# Patient Record
Sex: FEMALE | Race: BLACK OR AFRICAN AMERICAN | Employment: FULL TIME | URBAN - METROPOLITAN AREA
[De-identification: names, ages, dates, MRNs, and addresses within clinical notes are randomized per-mention and may not be internally consistent; named-entity substitution may affect disease eponyms.]

---

## 2020-08-17 ENCOUNTER — APPOINTMENT (OUTPATIENT)
Dept: GENERAL RADIOLOGY | Age: 40
End: 2020-08-17
Payer: MEDICAID

## 2020-08-17 ENCOUNTER — HOSPITAL ENCOUNTER (EMERGENCY)
Age: 40
Discharge: HOME | End: 2020-08-17
Payer: MEDICAID

## 2020-08-17 VITALS
SYSTOLIC BLOOD PRESSURE: 123 MMHG | WEIGHT: 126.1 LBS | BODY MASS INDEX: 21.01 KG/M2 | DIASTOLIC BLOOD PRESSURE: 83 MMHG | HEIGHT: 65 IN | HEART RATE: 96 BPM | OXYGEN SATURATION: 97 % | RESPIRATION RATE: 16 BRPM | TEMPERATURE: 98.4 F

## 2020-08-17 PROCEDURE — 6370000000 HC RX 637 (ALT 250 FOR IP): Performed by: PHYSICIAN ASSISTANT

## 2020-08-17 PROCEDURE — 99283 EMERGENCY DEPT VISIT LOW MDM: CPT

## 2020-08-17 PROCEDURE — 73140 X-RAY EXAM OF FINGER(S): CPT

## 2020-08-17 RX ORDER — ACETAMINOPHEN 325 MG/1
TABLET ORAL
Status: DISPENSED
Start: 2020-08-17 | End: 2020-08-18

## 2020-08-17 RX ORDER — ACETAMINOPHEN 325 MG/1
650 TABLET ORAL ONCE
Status: COMPLETED | OUTPATIENT
Start: 2020-08-17 | End: 2020-08-17

## 2020-08-17 RX ORDER — NAPROXEN 500 MG/1
500 TABLET ORAL 2 TIMES DAILY WITH MEALS
Qty: 30 TABLET | Refills: 0 | Status: ON HOLD | OUTPATIENT
Start: 2020-08-17 | End: 2020-12-26

## 2020-08-17 RX ORDER — NAPROXEN 250 MG/1
TABLET ORAL
Status: DISPENSED
Start: 2020-08-17 | End: 2020-08-18

## 2020-08-17 RX ORDER — ACETAMINOPHEN 500 MG
500 TABLET ORAL EVERY 6 HOURS PRN
Qty: 30 TABLET | Refills: 0 | Status: SHIPPED | OUTPATIENT
Start: 2020-08-17 | End: 2021-05-13 | Stop reason: CLARIF

## 2020-08-17 RX ORDER — NAPROXEN 250 MG/1
500 TABLET ORAL ONCE
Status: COMPLETED | OUTPATIENT
Start: 2020-08-17 | End: 2020-08-17

## 2020-08-17 RX ADMIN — ACETAMINOPHEN 650 MG: 325 TABLET ORAL at 17:55

## 2020-08-17 RX ADMIN — NAPROXEN 500 MG: 250 TABLET ORAL at 17:55

## 2020-08-17 ASSESSMENT — PAIN SCALES - GENERAL
PAINLEVEL_OUTOF10: 10
PAINLEVEL_OUTOF10: 10

## 2020-08-17 ASSESSMENT — ENCOUNTER SYMPTOMS
VOMITING: 0
NAUSEA: 0
DIARRHEA: 0
COUGH: 0
SHORTNESS OF BREATH: 0
BACK PAIN: 0
ABDOMINAL PAIN: 0
EYE PAIN: 0

## 2020-08-17 ASSESSMENT — PAIN DESCRIPTION - LOCATION: LOCATION: FINGER (COMMENT WHICH ONE)

## 2020-08-17 ASSESSMENT — PAIN DESCRIPTION - DESCRIPTORS: DESCRIPTORS: ACHING;THROBBING

## 2020-08-17 ASSESSMENT — PAIN DESCRIPTION - PAIN TYPE: TYPE: ACUTE PAIN

## 2020-08-17 ASSESSMENT — PAIN DESCRIPTION - FREQUENCY: FREQUENCY: INTERMITTENT

## 2020-08-17 ASSESSMENT — PAIN DESCRIPTION - ORIENTATION: ORIENTATION: RIGHT

## 2020-08-17 NOTE — ED PROVIDER NOTES
[address] [facility] ENCOUNTER        Pt Name: Lakesha Ramos  MRN: [id number]  [place] 1980  Date of evaluation: 8/17/2020  Provider: YARELI Cardoza  PCP: Jose Angel Dupree MD    Evaluation by EDBBIE. My supervising physician was available for consultation. CHIEF COMPLAINT       Chief Complaint   Patient presents with    Finger Pain     right index at first joint intermittent x 2+months. No injury       HISTORY OF PRESENT ILLNESS   (Location, Timing/Onset, Context/Setting, Quality, Duration, Modifying Factors, Severity, Associated Signs and Symptoms)  Note limiting factors. Lakesha Ramos is a 47 y.o. female duration of finger pain. Patient reports a 2-month history of atraumatic right second MCP pain. Reports new swelling and worsening pain since yesterday. It is aching and throbbing, rated as severe. It is constant. No fever/chills. No numbness or tingling. Pain is worse with touch and flexion/extension of digit. No wound. No injury. No h/o gout. The patient denies fever or chills, chest pain, shortness of breath, abdominal pain, nausea, vomiting, diarrhea. No recent travel, exposure to sick contacts, or recent antibiotics. No other acute concerns, associated symptoms or modifying factors. Nursing Notes were all reviewed and agreed with or any disagreements were addressed in the HPI. REVIEW OF SYSTEMS    (2-9 systems for level 4, 10 or more for level 5)     Review of Systems   Constitutional: Negative for chills, fatigue and fever. Eyes: Negative for pain. Respiratory: Negative for cough and shortness of breath. Cardiovascular: Negative for chest pain. Gastrointestinal: Negative for abdominal pain, diarrhea, nausea and vomiting. Genitourinary: Negative for dysuria. Musculoskeletal: Positive for arthralgias and joint swelling. Negative for back pain, neck pain and neck stiffness. Skin: Negative for rash.    Neurological: Negative for dizziness and headaches. Psychiatric/Behavioral: Negative for confusion. Positives and Pertinent negatives as per HPI. Except as noted above in the ROS, all other systems were reviewed and negative. PAST MEDICAL HISTORY     Past Medical History:   Diagnosis Date    Anemia     Scabies     Tobacco dependence          SURGICAL HISTORY     Past Surgical History:   Procedure Laterality Date    TYMPANOSTOMY TUBE PLACEMENT           CURRENTMEDICATIONS       Previous Medications    FAMOTIDINE (PEPCID) 20 MG TABLET    Take 1 tablet by mouth 2 times daily    MAGNESIUM OXIDE (MAGOX 400) 400 (241.3 MG) MG TABS TABLET    Take 1 tablet by mouth 2 times daily    ONDANSETRON (ZOFRAN ODT) 4 MG DISINTEGRATING TABLET    Take 1 tablet by mouth every 8 hours as needed for Nausea    POTASSIUM & SODIUM PHOSPHATES (PHOS-NAK) 280-160-250 MG PACK    Take 1 packet by mouth 4 times daily         ALLERGIES     Patient has no known allergies. FAMILYHISTORY       Family History   Problem Relation Age of Onset    High Blood Pressure Mother     Diabetes Maternal Grandmother     Stroke Maternal Grandmother           SOCIAL HISTORY       Social History     Tobacco Use    Smoking status: Current Every Day Smoker     Packs/day: 0.50    Smokeless tobacco: Never Used   Substance Use Topics    Alcohol use: Yes     Comment: 1-2 weekly    Drug use: Yes     Types: Marijuana     Comment: every other day        SCREENINGS             PHYSICAL EXAM    (up to 7 for level 4, 8 or more for level 5)     ED Triage Vitals   BP Temp Temp src Pulse Resp SpO2 Height Weight   -- -- -- -- -- -- -- --       Physical Exam  Vitals signs and nursing note reviewed. Constitutional:       General: She is not in acute distress. Appearance: She is well-developed. She is not diaphoretic. HENT:      Head: Normocephalic and atraumatic. Eyes:      General:         Right eye: No discharge. Left eye: No discharge.    Neck: Musculoskeletal: Normal range of motion and neck supple. Pulmonary:      Effort: No respiratory distress. Breath sounds: No stridor. Musculoskeletal:      Left wrist: Normal.      Left hand: She exhibits decreased range of motion (limited index flexion due to pain. Able to extend digit fully.) and tenderness (Mild tenderness and swelling of the RIGHTsecond MCP region. No swelling extending into the digit or into the palmar aspect of the hand. There is no fluctuance. There is no warmth. It is tender with palpation. Brisk capillary refill. Sensation intact ). Skin:     General: Skin is warm and dry. Coloration: Skin is not pale. Neurological:      Mental Status: She is alert and oriented to person, place, and time. Comments: No gross facial drooping. Moves all 4 extremities spontaneously. Psychiatric:         Behavior: Behavior normal.         DIAGNOSTIC RESULTS   LABS:    Labs Reviewed - No data to display    All other labs were within normal range or not returned as of this dictation. EKG: All EKG's are interpreted by the Emergency Department Physician in the absence of a cardiologist.  Please see their note for interpretation of EKG. RADIOLOGY:   Non-plain film images such as CT, Ultrasound and MRI are read by the radiologist. Plain radiographic images are visualized and preliminarily interpreted by the ED Provider with the below findings:        Interpretation per the Radiologist below, if available at the time of this note:    XR FINGER RIGHT (MIN 2 VIEWS)   Preliminary Result   No acute osseous abnormality noted. Small periarticular sublucency which could represent degenerative change or   represent sequela of an inflammatory arthritity in the correct clinical   setting. Please correlate with patient history. No results found.         PROCEDURES   Unless otherwise noted below, none     Procedures    CRITICAL CARE TIME   N/A    CONSULTS:  None      EMERGENCY DEPARTMENT COURSE and DIFFERENTIAL DIAGNOSIS/MDM:   Vitals:    Vitals:    08/17/20 1714   BP: 123/83   Pulse: 96   Resp: 16   Temp: 98.4 °F (36.9 °C)   TempSrc: Oral   SpO2: 97%   Weight: 126 lb 1.7 oz (57.2 kg)   Height: 5' 5\" (1.651 m)       Patient was given the following medications:  Medications   naproxen (NAPROSYN) tablet 500 mg (500 mg Oral Given 8/17/20 1755)   acetaminophen (TYLENOL) tablet 650 mg (650 mg Oral Given 8/17/20 1755)           Differential diagnosis includes but not limited to fracture, dislocation, vascular injury, neurologic injury. Patient seen and examined today for atraumatic RIGHT MCP pain x 2 months with new swelling and worsening pain since yesterday. See HPI for patient presentation. Patient is in no acute distress, nontoxic, afebrile with unremarkable vital signs. No evidence of neurovascular injury. Plain films reviewed and interpreted: negative for acute osseous abnormality. Small periarticular sub-lucency likely representing inflammatory arthritis given history of present illness. Do not suspect septic arthritis. Recommended cool compresses, elevation, NSAIDs, follow-up with PCP for further evaluation. I instructed the patient to return to the ED immediately for any new or worsening symptoms. The patient verbalizes understanding. I estimate there is LOW risk for FRACTURE, COMPARTMENT SYNDROME, DEEP VENOUS THROMBOSIS, SEPTIC ARTHRITIS, TENDON OR NEUROVASCULAR INJURY, thus I consider the discharge disposition reasonable. FINAL IMPRESSION      1. Metacarpophalangeal joint pain, right    2.  Inflammatory arthritis          DISPOSITION/PLAN   DISPOSITION        PATIENT REFERREDTO:  Drew Gaxiola MD  [id number]  [address]  [phone]      ED follow up    [address]  [place] [address] [phone]    If symptoms worsen      DISCHARGE MEDICATIONS:  New Prescriptions

## 2020-08-17 NOTE — LETTER
[address] [facility] [address]  Phone: [phone]               August 17, 2020    Patient: Vannesa Craig   YOB: 1980   Date of Visit: 8/17/2020       To Whom It May Concern:    Vannesa Craig was seen and treated in our emergency department on 8/17/2020. She may return to work on 8/18/20 with no use of right hand until 8/22.       Sincerely,       YARELI Lentz         Signature:__________________________________

## 2020-08-20 ENCOUNTER — HOSPITAL ENCOUNTER (EMERGENCY)
Age: 40
Discharge: HOME | End: 2020-08-20
Attending: EMERGENCY MEDICINE
Payer: MEDICAID

## 2020-08-20 ENCOUNTER — APPOINTMENT (OUTPATIENT)
Dept: CT IMAGING | Age: 40
End: 2020-08-20
Payer: MEDICAID

## 2020-08-20 VITALS
TEMPERATURE: 96.6 F | HEART RATE: 90 BPM | DIASTOLIC BLOOD PRESSURE: 70 MMHG | OXYGEN SATURATION: 98 % | BODY MASS INDEX: 20.8 KG/M2 | RESPIRATION RATE: 16 BRPM | SYSTOLIC BLOOD PRESSURE: 113 MMHG | WEIGHT: 125 LBS

## 2020-08-20 LAB
A/G RATIO: 1.3 (ref 1.1–2.2)
ALBUMIN SERPL-MCNC: 4.4 G/DL (ref 3.4–5)
ALP BLD-CCNC: 96 U/L (ref 40–129)
ALT SERPL-CCNC: 7 U/L (ref 10–40)
ANION GAP SERPL CALCULATED.3IONS-SCNC: 21 MMOL/L (ref 3–16)
AST SERPL-CCNC: 23 U/L (ref 15–37)
BASOPHILS ABSOLUTE: 0 K/UL (ref 0–0.2)
BASOPHILS RELATIVE PERCENT: 0.4 %
BILIRUB SERPL-MCNC: 0.4 MG/DL (ref 0–1)
BUN BLDV-MCNC: 6 MG/DL (ref 7–20)
CALCIUM SERPL-MCNC: 9.4 MG/DL (ref 8.3–10.6)
CHLORIDE BLD-SCNC: 99 MMOL/L (ref 99–110)
CO2: 21 MMOL/L (ref 21–32)
CREAT SERPL-MCNC: <0.5 MG/DL (ref 0.6–1.1)
EOSINOPHILS ABSOLUTE: 0 K/UL (ref 0–0.6)
EOSINOPHILS RELATIVE PERCENT: 0.1 %
GFR AFRICAN AMERICAN: >60
GFR NON-AFRICAN AMERICAN: >60
GLOBULIN: 3.5 G/DL
GLUCOSE BLD-MCNC: 135 MG/DL (ref 70–99)
HCG QUALITATIVE: NEGATIVE
HCT VFR BLD CALC: 37.8 % (ref 36–48)
HEMOGLOBIN: 12.7 G/DL (ref 12–16)
LIPASE: 19 U/L (ref 13–60)
LYMPHOCYTES ABSOLUTE: 1.9 K/UL (ref 1–5.1)
LYMPHOCYTES RELATIVE PERCENT: 29.1 %
MAGNESIUM: 1.6 MG/DL (ref 1.8–2.4)
MCH RBC QN AUTO: 32 PG (ref 26–34)
MCHC RBC AUTO-ENTMCNC: 33.5 G/DL (ref 31–36)
MCV RBC AUTO: 95.4 FL (ref 80–100)
MONOCYTES ABSOLUTE: 0.4 K/UL (ref 0–1.3)
MONOCYTES RELATIVE PERCENT: 5.8 %
NEUTROPHILS ABSOLUTE: 4.2 K/UL (ref 1.7–7.7)
NEUTROPHILS RELATIVE PERCENT: 64.6 %
OCCULT BLOOD SCREENING: ABNORMAL
PDW BLD-RTO: 13.2 % (ref 12.4–15.4)
PLATELET # BLD: 225 K/UL (ref 135–450)
PMV BLD AUTO: 8 FL (ref 5–10.5)
POTASSIUM REFLEX MAGNESIUM: 2.9 MMOL/L (ref 3.5–5.1)
RBC # BLD: 3.97 M/UL (ref 4–5.2)
SODIUM BLD-SCNC: 141 MMOL/L (ref 136–145)
TOTAL PROTEIN: 7.9 G/DL (ref 6.4–8.2)
WBC # BLD: 6.5 K/UL (ref 4–11)

## 2020-08-20 PROCEDURE — 83735 ASSAY OF MAGNESIUM: CPT

## 2020-08-20 PROCEDURE — C9113 INJ PANTOPRAZOLE SODIUM, VIA: HCPCS | Performed by: EMERGENCY MEDICINE

## 2020-08-20 PROCEDURE — 93005 ELECTROCARDIOGRAM TRACING: CPT

## 2020-08-20 PROCEDURE — 2580000003 HC RX 258: Performed by: EMERGENCY MEDICINE

## 2020-08-20 PROCEDURE — 84703 CHORIONIC GONADOTROPIN ASSAY: CPT

## 2020-08-20 PROCEDURE — 96365 THER/PROPH/DIAG IV INF INIT: CPT

## 2020-08-20 PROCEDURE — 85025 COMPLETE CBC W/AUTO DIFF WBC: CPT

## 2020-08-20 PROCEDURE — 36415 COLL VENOUS BLD VENIPUNCTURE: CPT

## 2020-08-20 PROCEDURE — 96366 THER/PROPH/DIAG IV INF ADDON: CPT

## 2020-08-20 PROCEDURE — G0328 FECAL BLOOD SCRN IMMUNOASSAY: HCPCS

## 2020-08-20 PROCEDURE — 96372 THER/PROPH/DIAG INJ SC/IM: CPT

## 2020-08-20 PROCEDURE — 6360000002 HC RX W HCPCS: Performed by: EMERGENCY MEDICINE

## 2020-08-20 PROCEDURE — 83690 ASSAY OF LIPASE: CPT

## 2020-08-20 PROCEDURE — 6360000004 HC RX CONTRAST MEDICATION: Performed by: EMERGENCY MEDICINE

## 2020-08-20 PROCEDURE — 80053 COMPREHEN METABOLIC PANEL: CPT

## 2020-08-20 PROCEDURE — 74177 CT ABD & PELVIS W/CONTRAST: CPT

## 2020-08-20 PROCEDURE — 96375 TX/PRO/DX INJ NEW DRUG ADDON: CPT

## 2020-08-20 PROCEDURE — 96367 TX/PROPH/DG ADDL SEQ IV INF: CPT

## 2020-08-20 PROCEDURE — 6370000000 HC RX 637 (ALT 250 FOR IP): Performed by: EMERGENCY MEDICINE

## 2020-08-20 PROCEDURE — 99283 EMERGENCY DEPT VISIT LOW MDM: CPT

## 2020-08-20 RX ORDER — PREDNISONE 50 MG/1
50 TABLET ORAL DAILY
Qty: 4 TABLET | Refills: 0 | Status: SHIPPED | OUTPATIENT
Start: 2020-08-20 | End: 2020-08-24

## 2020-08-20 RX ORDER — PROMETHAZINE HYDROCHLORIDE 25 MG/1
25 TABLET ORAL EVERY 6 HOURS PRN
Qty: 20 TABLET | Refills: 0 | Status: SHIPPED | OUTPATIENT
Start: 2020-08-20 | End: 2020-08-25

## 2020-08-20 RX ORDER — DICYCLOMINE HYDROCHLORIDE 10 MG/1
10 CAPSULE ORAL EVERY 6 HOURS PRN
Qty: 20 CAPSULE | Refills: 0 | Status: SHIPPED | OUTPATIENT
Start: 2020-08-20 | End: 2021-01-15 | Stop reason: SDUPTHER

## 2020-08-20 RX ORDER — PREDNISONE 20 MG/1
20 TABLET ORAL ONCE
Status: COMPLETED | OUTPATIENT
Start: 2020-08-20 | End: 2020-08-20

## 2020-08-20 RX ORDER — CIPROFLOXACIN 500 MG/1
500 TABLET, FILM COATED ORAL ONCE
Status: COMPLETED | OUTPATIENT
Start: 2020-08-20 | End: 2020-08-20

## 2020-08-20 RX ORDER — CIPROFLOXACIN 2 MG/ML
400 INJECTION, SOLUTION INTRAVENOUS ONCE
Status: DISCONTINUED | OUTPATIENT
Start: 2020-08-20 | End: 2020-08-20

## 2020-08-20 RX ORDER — POTASSIUM CHLORIDE 7.45 MG/ML
10 INJECTION INTRAVENOUS ONCE
Status: COMPLETED | OUTPATIENT
Start: 2020-08-20 | End: 2020-08-20

## 2020-08-20 RX ORDER — FENTANYL CITRATE 50 UG/ML
50 INJECTION, SOLUTION INTRAMUSCULAR; INTRAVENOUS EVERY 30 MIN PRN
Status: DISCONTINUED | OUTPATIENT
Start: 2020-08-20 | End: 2020-08-20 | Stop reason: HOSPADM

## 2020-08-20 RX ORDER — METRONIDAZOLE 500 MG/1
500 TABLET ORAL ONCE
Status: COMPLETED | OUTPATIENT
Start: 2020-08-20 | End: 2020-08-20

## 2020-08-20 RX ORDER — 0.9 % SODIUM CHLORIDE 0.9 %
1000 INTRAVENOUS SOLUTION INTRAVENOUS ONCE
Status: COMPLETED | OUTPATIENT
Start: 2020-08-20 | End: 2020-08-20

## 2020-08-20 RX ORDER — METRONIDAZOLE 500 MG/1
500 TABLET ORAL 3 TIMES DAILY
Qty: 30 TABLET | Refills: 0 | Status: SHIPPED | OUTPATIENT
Start: 2020-08-20 | End: 2020-08-30

## 2020-08-20 RX ORDER — CIPROFLOXACIN 500 MG/1
500 TABLET, FILM COATED ORAL 2 TIMES DAILY
Qty: 20 TABLET | Refills: 0 | Status: SHIPPED | OUTPATIENT
Start: 2020-08-20 | End: 2020-08-30

## 2020-08-20 RX ORDER — PROMETHAZINE HYDROCHLORIDE 25 MG/ML
6.25 INJECTION, SOLUTION INTRAMUSCULAR; INTRAVENOUS ONCE
Status: COMPLETED | OUTPATIENT
Start: 2020-08-20 | End: 2020-08-20

## 2020-08-20 RX ORDER — MAGNESIUM SULFATE 1 G/100ML
1 INJECTION INTRAVENOUS ONCE
Status: COMPLETED | OUTPATIENT
Start: 2020-08-20 | End: 2020-08-20

## 2020-08-20 RX ORDER — ONDANSETRON 2 MG/ML
4 INJECTION INTRAMUSCULAR; INTRAVENOUS ONCE
Status: COMPLETED | OUTPATIENT
Start: 2020-08-20 | End: 2020-08-20

## 2020-08-20 RX ADMIN — PREDNISONE 20 MG: 20 TABLET ORAL at 14:53

## 2020-08-20 RX ADMIN — HYDROMORPHONE HYDROCHLORIDE 1 MG: 1 INJECTION, SOLUTION INTRAMUSCULAR; INTRAVENOUS; SUBCUTANEOUS at 13:26

## 2020-08-20 RX ADMIN — SODIUM CHLORIDE 1000 ML: 9 INJECTION, SOLUTION INTRAVENOUS at 11:22

## 2020-08-20 RX ADMIN — FENTANYL CITRATE 50 MCG: 50 INJECTION, SOLUTION INTRAMUSCULAR; INTRAVENOUS at 11:25

## 2020-08-20 RX ADMIN — Medication 10 MEQ: at 12:30

## 2020-08-20 RX ADMIN — PROMETHAZINE HYDROCHLORIDE 6.25 MG: 25 INJECTION INTRAMUSCULAR; INTRAVENOUS at 11:26

## 2020-08-20 RX ADMIN — METRONIDAZOLE 500 MG: 500 TABLET ORAL at 14:52

## 2020-08-20 RX ADMIN — CIPROFLOXACIN 500 MG: 500 TABLET, FILM COATED ORAL at 14:52

## 2020-08-20 RX ADMIN — ONDANSETRON 4 MG: 2 INJECTION INTRAMUSCULAR; INTRAVENOUS at 10:50

## 2020-08-20 RX ADMIN — IOVERSOL 100 ML: 678 INJECTION INTRA-ARTERIAL; INTRAVENOUS at 11:45

## 2020-08-20 RX ADMIN — SODIUM CHLORIDE 80 MG: 9 INJECTION, SOLUTION INTRAVENOUS at 12:00

## 2020-08-20 RX ADMIN — MAGNESIUM SULFATE HEPTAHYDRATE 1 G: 1 INJECTION, SOLUTION INTRAVENOUS at 13:43

## 2020-08-20 ASSESSMENT — PAIN SCALES - GENERAL
PAINLEVEL_OUTOF10: 10
PAINLEVEL_OUTOF10: 10

## 2020-08-20 ASSESSMENT — PAIN DESCRIPTION - LOCATION: LOCATION: ABDOMEN

## 2020-08-20 ASSESSMENT — PAIN DESCRIPTION - PAIN TYPE: TYPE: ACUTE PAIN

## 2020-08-20 ASSESSMENT — PAIN DESCRIPTION - DESCRIPTORS: DESCRIPTORS: CRAMPING

## 2020-08-20 NOTE — ED NOTES
Pt sitting up in bed drinking water rina well and eating crackers  Family at bedside     Maria Victoria Grier, [address] [facility]  08/20/20 4678

## 2020-08-20 NOTE — ED PROVIDER NOTES
problem. I have reviewed the following from the nursing documentation:      Prior to Admission medications    Medication Sig Start Date End Date Taking?  Authorizing Provider   naproxen (NAPROSYN) 500 MG tablet Take 1 tablet by mouth 2 times daily (with meals) 8/17/20   YARELI Sevilla   acetaminophen (APAP EXTRA STRENGTH) 500 MG tablet Take 1 tablet by mouth every 6 hours as needed for Pain 8/17/20   YARELI Sevilla   famotidine (PEPCID) 20 MG tablet Take 1 tablet by mouth 2 times daily 1/13/18   YARELI Olguin   ondansetron (ZOFRAN ODT) 4 MG disintegrating tablet Take 1 tablet by mouth every 8 hours as needed for Nausea 1/13/18   YARELI Olguin   magnesium oxide (MAGOX 400) 400 (241.3 Mg) MG TABS tablet Take 1 tablet by mouth 2 times daily 1/13/18   YARELI Olguin   potassium & sodium phosphates (PHOS-NAK) 280-160-250 MG PACK Take 1 packet by mouth 4 times daily 1/13/18   YARELI Olguin       Allergies as of 08/20/2020    (No Known Allergies)       Past Medical History:   Diagnosis Date    Anemia     Scabies     Tobacco dependence         Surgical History:   Past Surgical History:   Procedure Laterality Date    TYMPANOSTOMY TUBE PLACEMENT          Family History:    Family History   Problem Relation Age of Onset    High Blood Pressure Mother     Diabetes Maternal Grandmother     Stroke Maternal Grandmother        Social History     Socioeconomic History    Marital status: Single     Spouse name: Not on file    Number of children: Not on file    Years of education: Not on file    Highest education level: Not on file   Occupational History    Not on file   Social Needs    Financial resource strain: Not on file    Food insecurity     Worry: Not on file     Inability: Not on file    Transportation needs     Medical: Not on file     Non-medical: Not on file   Tobacco Use    Smoking status: Current Every Day Smoker     Packs/day: 0.50    Smokeless tobacco: Never Used   Substance and Sexual Activity    Alcohol use: Yes     Comment: 1-2 weekly    Drug use: Yes     Types: Marijuana     Comment: every other day     Sexual activity: Yes   Lifestyle    Physical activity     Days per week: Not on file     Minutes per session: Not on file    Stress: Not on file   Relationships    Social connections     Talks on phone: Not on file     Gets together: Not on file     Attends Je[employer]'s witness service: Not on file     Active member of club or organization: Not on file     Attends meetings of clubs or organizations: Not on file     Relationship status: Not on file    Intimate partner violence     Fear of current or ex partner: Not on file     Emotionally abused: Not on file     Physically abused: Not on file     Forced sexual activity: Not on file   Other Topics Concern    Not on file   Social History Narrative    Not on file       Nursing notes reviewed. ED Triage Vitals [08/20/20 0949]   Enc Vitals Group      /70      Pulse 88      Resp 18      Temp       Temp src       SpO2 100 %      Weight 125 lb (56.7 kg)      Height       Head Circumference       Peak Flow       Pain Score       Pain Loc       Pain Edu? Excl. in [address]? GENERAL:    Awake, alert. Curled up in a fetal position on her left side. Well developed, well nourished with apparent distress. Nontoxic-appearing, non-ill-appearing. HENT:    Normocephalic, Atraumatic, no lacerations. Piercing on her bottom lip. Oropharynx clear, no tonsilar inflammation, no tonsilar exudates,   no airway obstruction, moist mucous membranes. Uvula was midline and has symmetric rise. EYES:    Conjunctiva normal,   Pupils equal round and reactive to light,   Extraocular movements normal.  NECK:    Trachea is midline. No stridor. CHEST:    Regular rate and regular rhythm, no murmurs/rubs/gallops,   normal S1/S2, chest wall non-tender. LUNGS:    No respiratory distress. No abdominal retractions, no sternal retractions.    Clear to auscultation bilaterally, no wheezing, no rhochi, no rales  Speaking comfortably in full sentences  ABDOMEN:    Soft, diffusely tender however lower abdominal had worsening tenderness compared to upper abdominal, non-distended. No guarding. No rebound. No costovertebral angle tenderness to palpation. Hyperactive BS, no organomegaly, no abdominal masses  EXTREMITIES:   Moves extremities x4 with purpose. Normal range of motion, no edema,   no tenderness, no deformity,   distal pulses present and equal bilaterally. SKIN:    Warm, dry and intact. NEUROLOGIC:  Normal mental status. Moving all extremities to command. Alert and oriented x4   without focal motor deficit or gross sensory deficit. Normal speech. PSYCHIATRIC:  Not anxious,   normal mood and affect,   thoughts are linear and organized,   without delusions/hallucinations,   responds appropriately to questions      LABS and DIAGNOSTIC RESULTS      RADIOLOGY  X-RAYS:  I have reviewed radiologic plain film image(s). ALL OTHER NON-PLAIN FILM IMAGES SUCH AS CT, ULTRASOUND AND MRI HAVE BEEN READ BY THE RADIOLOGIST. CT ABDOMEN PELVIS W IV CONTRAST Additional Contrast? None   Preliminary Result   Diffuse chronic wall thickening of the entire colon and rectum suggesting a   chronic inflammatory colitis such as ulcerative colitis. No evidence of   focal obstruction. No evidence of acute pericolonic inflammatory changes to   suggest acute inflammation. Diverticulosis without evidence of acute diverticulitis.             LABS  Results for orders placed or performed during the hospital encounter of 08/20/20   Blood Occult Stool Screen #1   Result Value Ref Range    Occult Blood Screening Result: POSITIVE  Normal range: Negative   (A)    CBC Auto Differential   Result Value Ref Range    WBC 6.5 4.0 - 11.0 K/uL    RBC 3.97 (L) 4.00 - 5.20 M/uL    Hemoglobin 12.7 12.0 - 16.0 g/dL    Hematocrit 37.8 36.0 - 48.0 %    MCV 95.4 80.0 - 100.0 fL MCH 32.0 26.0 - 34.0 pg    MCHC 33.5 31.0 - 36.0 g/dL    RDW 13.2 12.4 - 15.4 %    Platelets 169 113 - 654 K/uL    MPV 8.0 5.0 - 10.5 fL    Neutrophils % 64.6 %    Lymphocytes % 29.1 %    Monocytes % 5.8 %    Eosinophils % 0.1 %    Basophils % 0.4 %    Neutrophils Absolute 4.2 1.7 - 7.7 K/uL    Lymphocytes Absolute 1.9 1.0 - 5.1 K/uL    Monocytes Absolute 0.4 0.0 - 1.3 K/uL    Eosinophils Absolute 0.0 0.0 - 0.6 K/uL    Basophils Absolute 0.0 0.0 - 0.2 K/uL   Comprehensive Metabolic Panel w/ Reflex to MG   Result Value Ref Range    Sodium 141 136 - 145 mmol/L    Potassium reflex Magnesium 2.9 (LL) 3.5 - 5.1 mmol/L    Chloride 99 99 - 110 mmol/L    CO2 21 21 - 32 mmol/L    Anion Gap 21 (H) 3 - 16    Glucose 135 (H) 70 - 99 mg/dL    BUN 6 (L) 7 - 20 mg/dL    CREATININE <0.5 (L) 0.6 - 1.1 mg/dL    GFR Non-African American >60 >60    GFR African American >60 >60    Calcium 9.4 8.3 - 10.6 mg/dL    Total Protein 7.9 6.4 - 8.2 g/dL    Alb 4.4 3.4 - 5.0 g/dL    Albumin/Globulin Ratio 1.3 1.1 - 2.2    Total Bilirubin 0.4 0.0 - 1.0 mg/dL    Alkaline Phosphatase 96 40 - 129 U/L    ALT 7 (L) 10 - 40 U/L    AST 23 15 - 37 U/L    Globulin 3.5 g/dL   Lipase   Result Value Ref Range    Lipase 19.0 13.0 - 60.0 U/L   HCG Qualitative, Serum   Result Value Ref Range    hCG Qual Negative Detects HCG level >10 MIU/mL   Magnesium   Result Value Ref Range    Magnesium 1.60 (L) 1.80 - 2.40 mg/dL       PROCEDURES      MEDICAL DECISION MAKING    Procedures/interventions/images ordered for this visit  Orders Placed This Encounter   Procedures    CT ABDOMEN PELVIS W WO CONTRAST Additional Contrast? None    Blood Occult Stool Screen #1    CBC Auto Differential    Comprehensive Metabolic Panel w/ Reflex to MG    Lipase    HCG Qualitative, Serum    Magnesium    Saline lock IV       Medications ordered for this visit  Orders Placed This Encounter   Medications    0.9 % sodium chloride bolus    ondansetron (ZOFRAN) injection 4 mg    potassium chloride 10 mEq/100 mL IVPB (Peripheral Line)    fentaNYL (SUBLIMAZE) injection 50 mcg    promethazine (PHENERGAN) injection 6.25 mg    pantoprazole (PROTONIX) 80 mg in sodium chloride 0.9 % 50 mL bolus    pantoprazole (PROTONIX) 80 mg in sodium chloride 0.9 % 100 mL infusion       ED course notes for this visit  ED Course as of Aug 20 1438   Thu Aug 20, 2020   1415 She is feeling significantly better, she is no longer writhing in bed, no longer vomiting. I discussed the results of the CT and explained to her that I was going to put her on antibiotics however I feel that this is more chronic inflammatory changes not an infectious change. Has a long conversation with her about discontinuing alcohol intake. Patient repeat abdominal exam was benign.    [SG]      ED Course User Index  [SG] Arya Rojas MD       I wore goggles, surgical mask, N95 mask and gloves when I evaluated the patient. I evaluated the patient in room QC 13/13    - Old records reviewed, specifically Her other visit for colitis  - Patient was given Pain medication, IV fluids, antibiotics, electrolyte replacement, antiemetics in the ED  - Upon reassessment, patient Had significant improvement in her symptoms and wishes to drink water  - Diagnostic studies reviewed and results discussed with patient  - Incidental findings of Chronic inflammatory changes of the colon also discussed    Patient had low magnesium and potassium levels and I replaced those with appropriate electrolytes. Patient is aware that the colitis can be precipitated by the drinking of alcohol. Patient was easily able to tolerate drinking fluids and looks significantly better and is no longer in any distress.     This is a very pleasant patient with abdominal pain without significant evidence of toxicity, shock, sepsis, hemodynamic or cardiopulmonary instability, acute appendicitis, acute cholecystitis, AAA, bowel obstruction, bowel perforation, herpes zoster, a cardiac or pulmonary etiology as a cause for the abdominal symptoms, or any disease process requiring other immediate surgical or medical intervention at this time. After treatment in the ER, patient had improvement of their symptoms. On repeat physical exam, patient has a benign abdominal exam.  Pain management and follow-up plan were discussed with the patient. It is understood that if the patient is not improving as expected or if other new symptoms or signs of concern develop, other etiologies or diagnoses may need to be considered requiring other tests, treatments, consultations, and/or admission. The diagnosis, plan, expected course, follow-up, and return precautions were discussed and all questions were answered. Final Impression    1. Lower abdominal pain    2. Nausea vomiting and diarrhea    3. Colitis        Blood pressure (!) 103/91, pulse 52, temperature 96.6 °F (35.9 °C), temperature source Temporal, resp. rate 16, weight 125 lb (56.7 kg), last menstrual period 08/06/2020, SpO2 94 %. Disposition  At this point I do not feel the patient requires further work up and it is reasonable to discharge the patient. I had a discussion with the patient and/or their surrogate regarding diagnosis, diagnostic testing results, treatment/ plan of care, and follow up. Patient and/or companions verbalized understanding of the ED workup, any relevant findings as well as any incidental findings, and the disposition and plan. There was shared decision-making between myself as well as the patient and/or their surrogate and we are all in agreement with discharge home. There was an opportunity for questions and all questions were answered to the best of my ability and to the satisfaction of the patient and/or patient family. Patient agreed to follow upas recommend for further evaluation/treatment.  The patient was given strict return precautions as we discussed symptoms that would necessitate return to

## 2020-08-21 LAB
EKG ATRIAL RATE: 78 BPM
EKG DIAGNOSIS: NORMAL
EKG Q-T INTERVAL: 506 MS
EKG QRS DURATION: 76 MS
EKG QTC CALCULATION (BAZETT): 522 MS
EKG R AXIS: 58 DEGREES
EKG T AXIS: -3 DEGREES
EKG VENTRICULAR RATE: 64 BPM

## 2020-09-29 ENCOUNTER — APPOINTMENT (OUTPATIENT)
Dept: CT IMAGING | Age: 40
End: 2020-09-29
Payer: MEDICAID

## 2020-09-29 ENCOUNTER — HOSPITAL ENCOUNTER (EMERGENCY)
Age: 40
Discharge: HOME | End: 2020-09-29
Attending: EMERGENCY MEDICINE
Payer: MEDICAID

## 2020-09-29 VITALS
TEMPERATURE: 97.8 F | HEART RATE: 84 BPM | DIASTOLIC BLOOD PRESSURE: 58 MMHG | RESPIRATION RATE: 17 BRPM | OXYGEN SATURATION: 99 % | SYSTOLIC BLOOD PRESSURE: 105 MMHG | WEIGHT: 127.43 LBS | BODY MASS INDEX: 21.23 KG/M2 | HEIGHT: 65 IN

## 2020-09-29 LAB
ANION GAP SERPL CALCULATED.3IONS-SCNC: 14 MMOL/L (ref 3–16)
BASOPHILS ABSOLUTE: 0 K/UL (ref 0–0.2)
BASOPHILS RELATIVE PERCENT: 0.7 %
BUN BLDV-MCNC: 11 MG/DL (ref 7–20)
CALCIUM SERPL-MCNC: 9.8 MG/DL (ref 8.3–10.6)
CHLORIDE BLD-SCNC: 104 MMOL/L (ref 99–110)
CO2: 24 MMOL/L (ref 21–32)
CREAT SERPL-MCNC: 0.6 MG/DL (ref 0.6–1.1)
EKG ATRIAL RATE: 98 BPM
EKG DIAGNOSIS: NORMAL
EKG P AXIS: 15 DEGREES
EKG P-R INTERVAL: 172 MS
EKG Q-T INTERVAL: 336 MS
EKG QRS DURATION: 68 MS
EKG QTC CALCULATION (BAZETT): 428 MS
EKG R AXIS: 42 DEGREES
EKG T AXIS: 24 DEGREES
EKG VENTRICULAR RATE: 98 BPM
EOSINOPHILS ABSOLUTE: 0 K/UL (ref 0–0.6)
EOSINOPHILS RELATIVE PERCENT: 0.2 %
GFR AFRICAN AMERICAN: >60
GFR NON-AFRICAN AMERICAN: >60
GLUCOSE BLD-MCNC: 106 MG/DL (ref 70–99)
HCG QUALITATIVE: NEGATIVE
HCT VFR BLD CALC: 34.4 % (ref 36–48)
HEMOGLOBIN: 11.3 G/DL (ref 12–16)
LYMPHOCYTES ABSOLUTE: 2.3 K/UL (ref 1–5.1)
LYMPHOCYTES RELATIVE PERCENT: 47.4 %
MCH RBC QN AUTO: 31.6 PG (ref 26–34)
MCHC RBC AUTO-ENTMCNC: 33 G/DL (ref 31–36)
MCV RBC AUTO: 95.7 FL (ref 80–100)
MONOCYTES ABSOLUTE: 0.4 K/UL (ref 0–1.3)
MONOCYTES RELATIVE PERCENT: 8.7 %
NEUTROPHILS ABSOLUTE: 2.1 K/UL (ref 1.7–7.7)
NEUTROPHILS RELATIVE PERCENT: 43 %
PDW BLD-RTO: 13.5 % (ref 12.4–15.4)
PLATELET # BLD: 241 K/UL (ref 135–450)
PMV BLD AUTO: 7.8 FL (ref 5–10.5)
POTASSIUM REFLEX MAGNESIUM: 4.2 MMOL/L (ref 3.5–5.1)
RBC # BLD: 3.59 M/UL (ref 4–5.2)
SODIUM BLD-SCNC: 142 MMOL/L (ref 136–145)
TROPONIN: <0.01 NG/ML
WBC # BLD: 4.9 K/UL (ref 4–11)

## 2020-09-29 PROCEDURE — 6370000000 HC RX 637 (ALT 250 FOR IP): Performed by: EMERGENCY MEDICINE

## 2020-09-29 PROCEDURE — 84484 ASSAY OF TROPONIN QUANT: CPT

## 2020-09-29 PROCEDURE — 84703 CHORIONIC GONADOTROPIN ASSAY: CPT

## 2020-09-29 PROCEDURE — 71260 CT THORAX DX C+: CPT

## 2020-09-29 PROCEDURE — 85025 COMPLETE CBC W/AUTO DIFF WBC: CPT

## 2020-09-29 PROCEDURE — 96374 THER/PROPH/DIAG INJ IV PUSH: CPT

## 2020-09-29 PROCEDURE — 6360000004 HC RX CONTRAST MEDICATION: Performed by: EMERGENCY MEDICINE

## 2020-09-29 PROCEDURE — 93010 ELECTROCARDIOGRAM REPORT: CPT | Performed by: INTERNAL MEDICINE

## 2020-09-29 PROCEDURE — 99285 EMERGENCY DEPT VISIT HI MDM: CPT

## 2020-09-29 PROCEDURE — 36415 COLL VENOUS BLD VENIPUNCTURE: CPT

## 2020-09-29 PROCEDURE — 93005 ELECTROCARDIOGRAM TRACING: CPT | Performed by: EMERGENCY MEDICINE

## 2020-09-29 PROCEDURE — 6360000002 HC RX W HCPCS: Performed by: EMERGENCY MEDICINE

## 2020-09-29 PROCEDURE — 80048 BASIC METABOLIC PNL TOTAL CA: CPT

## 2020-09-29 RX ORDER — ACETAMINOPHEN 325 MG/1
650 TABLET ORAL ONCE
Status: COMPLETED | OUTPATIENT
Start: 2020-09-29 | End: 2020-09-29

## 2020-09-29 RX ORDER — KETOROLAC TROMETHAMINE 30 MG/ML
15 INJECTION, SOLUTION INTRAMUSCULAR; INTRAVENOUS ONCE
Status: COMPLETED | OUTPATIENT
Start: 2020-09-29 | End: 2020-09-29

## 2020-09-29 RX ADMIN — KETOROLAC TROMETHAMINE 15 MG: 30 INJECTION, SOLUTION INTRAMUSCULAR at 12:23

## 2020-09-29 RX ADMIN — ACETAMINOPHEN 650 MG: 325 TABLET ORAL at 12:23

## 2020-09-29 RX ADMIN — IOPAMIDOL 100 ML: 755 INJECTION, SOLUTION INTRAVENOUS at 12:54

## 2020-09-29 ASSESSMENT — PAIN DESCRIPTION - DESCRIPTORS: DESCRIPTORS: SHARP

## 2020-09-29 ASSESSMENT — PAIN SCALES - GENERAL
PAINLEVEL_OUTOF10: 8
PAINLEVEL_OUTOF10: 8

## 2020-09-29 ASSESSMENT — PAIN DESCRIPTION - FREQUENCY: FREQUENCY: CONTINUOUS

## 2020-09-29 ASSESSMENT — PAIN DESCRIPTION - LOCATION: LOCATION: CHEST

## 2020-09-29 ASSESSMENT — PAIN DESCRIPTION - PAIN TYPE: TYPE: ACUTE PAIN

## 2020-09-29 NOTE — ED PROVIDER NOTES
[facility] ENCOUNTER      Pt Name: Pat Ortiz  MRN: [id number]  [place] 1980  Date of evaluation: 9/29/2020  Provider: Harpreet Aleman MD    CHIEF COMPLAINT       Chief Complaint   Patient presents with    Chest Pain     Chest pain for a week and a half, off an on. Pt states a dull pain at the moment. HISTORY OF PRESENT ILLNESS  (Location/Symptom, Timing/Onset,Context/Setting, Quality, Duration, Modifying Factors, Severity). Note limiting factors. Pat Ortiz is a 47 y.o. female who presents to the emergency department secondary to concern for chest pain that has been going on for about a week and a half, states it is always there but some days are worse than others. Pain is worse when she lays down on her sides, bends down, or sometimes when she is breathing (any kind of breathing). Pain is also worse sometimes when she lifts up either arm, though that was yesterday not today. Says she noticed a knot on the left side of her chest last weekend. Describes the pain as sharp. There is no radiation of the pain. Pain is not worse with exertion. Denies shortness of breath. States this has never happened to her before. Denies fevers, chills, cough, nausea, vomiting, sweating episodes, abdominal pain. Reports no change in urination or BMs. Last BM was this morning and normal for her. States she has been eating and drinking well. Says she has not been to her primary care in a long time and probably needs a new one. Denies trauma, new activities. She works in a warehouse packing boxes. Has not tried any medication at home for the pain. Past medical history noted below, significant for anemia, scabies, tobacco dependence. Currently smokes 1/2ppd, not ready to quit. No known family history of heart disease. Aside from what is stated above denies any other symptoms or modifying factors. Nursing Notes reviewed.     REVIEW OF SYSTEMS  (2-9 systems for level 4, 10 or more for level 5)   Review of Systems  Pertinent positive and negative findings as documented in the HPI; otherwise all other systems were reviewed and were negative. PAST MEDICAL HISTORY     Past Medical History:   Diagnosis Date    Anemia     Scabies     Tobacco dependence        SURGICALHISTORY       Past Surgical History:   Procedure Laterality Date    TYMPANOSTOMY TUBE PLACEMENT       CURRENT MEDICATIONS       Previous Medications    ACETAMINOPHEN (APAP EXTRA STRENGTH) 500 MG TABLET    Take 1 tablet by mouth every 6 hours as needed for Pain    DICYCLOMINE (BENTYL) 10 MG CAPSULE    Take 1 capsule by mouth every 6 hours as needed (cramps)    FAMOTIDINE (PEPCID) 20 MG TABLET    Take 1 tablet by mouth 2 times daily    MAGNESIUM OXIDE (MAGOX 400) 400 (241.3 MG) MG TABS TABLET    Take 1 tablet by mouth 2 times daily    NAPROXEN (NAPROSYN) 500 MG TABLET    Take 1 tablet by mouth 2 times daily (with meals)    ONDANSETRON (ZOFRAN ODT) 4 MG DISINTEGRATING TABLET    Take 1 tablet by mouth every 8 hours as needed for Nausea    POTASSIUM & SODIUM PHOSPHATES (PHOS-NAK) 280-160-250 MG PACK    Take 1 packet by mouth 4 times daily      ALLERGIES     Patient has no known allergies.   FAMILY HISTORY       Family History   Problem Relation Age of Onset    High Blood Pressure Mother     Diabetes Maternal Grandmother     Stroke Maternal Grandmother      SOCIAL HISTORY       Social History     Socioeconomic History    Marital status: Single     Spouse name: None    Number of children: None    Years of education: None    Highest education level: None   Occupational History    None   Social Needs    Financial resource strain: None    Food insecurity     Worry: None     Inability: None    Transportation needs     Medical: None     Non-medical: None   Tobacco Use    Smoking status: Current Every Day Smoker     Packs/day: 0.50    Smokeless tobacco: Never Used   Substance and Sexual Activity    Alcohol use: Yes     Comment: 1-2 weekly    Drug use: Yes     Types: Marijuana     Comment: every other day     Sexual activity: Yes   Lifestyle    Physical activity     Days per week: None     Minutes per session: None    Stress: None   Relationships    Social connections     Talks on phone: None     Gets together: None     Attends Restorationism service: None     Active member of club or organization: None     Attends meetings of clubs or organizations: None     Relationship status: None    Intimate partner violence     Fear of current or ex partner: None     Emotionally abused: None     Physically abused: None     Forced sexual activity: None   Other Topics Concern    None   Social History Narrative    None     SCREENINGS         PHYSICAL EXAM  (up to 7 for level 4, 8 or more for level 5)   INITIAL VITALS: BP: 123/75, Temp: 97.8 °F (36.6 °C), Pulse: 102, Resp: 19, SpO2: 99 %   Physical Exam  Vitals signs reviewed. HENT:      Head: Normocephalic and atraumatic. Right Ear: External ear normal.      Left Ear: External ear normal.      Nose: Nose normal.      Mouth/Throat:      Mouth: Mucous membranes are moist.   Eyes:      General: No scleral icterus. Right eye: No discharge. Left eye: No discharge. Extraocular Movements: Extraocular movements intact. Conjunctiva/sclera: Conjunctivae normal.      Pupils: Pupils are equal, round, and reactive to light. Neck:      Musculoskeletal: Normal range of motion. No neck rigidity. Trachea: No tracheal deviation. Cardiovascular:      Rate and Rhythm: Tachycardia present. Pulmonary:      Effort: Pulmonary effort is normal. No respiratory distress. Chest:      Breasts: Breasts are symmetrical.         Right: Normal.         Left: Normal.       Abdominal:      General: There is no distension. Tenderness: There is no abdominal tenderness. There is no right CVA tenderness, left CVA tenderness or guarding.    Musculoskeletal: Normal range of motion. Right lower leg: No edema. Left lower leg: No edema. Lymphadenopathy:      Cervical: No cervical adenopathy. Skin:     General: Skin is warm and dry. Capillary Refill: Capillary refill takes 2 to 3 seconds. Neurological:      General: No focal deficit present. Mental Status: She is alert and oriented to person, place, and time. Motor: No weakness. Gait: Gait normal.   Psychiatric:         Mood and Affect: Mood normal.         Behavior: Behavior normal.       DIAGNOSTIC RESULTS   EKG: All EKG's are interpreted by the Emergency Department Physician who either signs or Co-signs this chart in the absence of a cardiologist.  Indication: CP  Interpretation: Rate 98, rhythm sinus, axis normal.  ME/QRS/QTc within normal limits. Nonspecific T wave flattening noted in multiple leads with no acute signs of ischemia. Comparison to prior EKG from August 20, 2020 is limited due to a poor baseline at that time. It is read as being in sinus rhythm with PACs and junctional escape. Also noted to have nonspecific T wave abnormalities and prolonged QT. There are no acute ischemic changes noted when compared to today. RADIOLOGY:   Interpretation per Radiologist below, if available at the time of this note:  CT CHEST PULMONARY EMBOLISM W CONTRAST   Final Result   No evidence of pulmonary embolism or acute pulmonary abnormality.            LABS:  Labs Reviewed   CBC WITH AUTO DIFFERENTIAL - Abnormal; Notable for the following components:       Result Value    RBC 3.59 (*)     Hemoglobin 11.3 (*)     Hematocrit 34.4 (*)     All other components within normal limits    Narrative:     Performed at:  [facility]  [address] [facility]   Phone [phone]   BASIC METABOLIC PANEL W/ REFLEX TO MG FOR LOW K - Abnormal; Notable for the following components:    Glucose 106 (*)     All other components within normal limits Narrative:     Performed at:  [facility] - [facility]  [address] [place], [facility]   Phone [phone]   TROPONIN    Narrative:     Performed at:  [facility]  [address] [place], [place] [facility]   Phone [phone]   HCG, SERUM, QUALITATIVE    Narrative:     Performed at:  [facility]  [address] [place], [facility]   Phone [phone]       All other labs were within normal range or not returned as of this dictation. EMERGENCY DEPARTMENT COURSE and DIFFERENTIAL DIAGNOSIS/MDM:   Patient was given the following medications:  Orders Placed This Encounter   Medications    ketorolac (TORADOL) injection 15 mg    acetaminophen (TYLENOL) tablet 650 mg    iopamidol (ISOVUE-370) 76 % injection 100 mL     CONSULTS:  None  INITIAL VITALS: BP: 123/75, Temp: 97.8 °F (36.6 °C), Pulse: 102, Resp: 19, SpO2: 99 %   RECENT VITALS:  BP: (!) 105/58,Temp: 97.8 °F (36.6 °C), Pulse: 89, Resp: 17, SpO2: 99 %     La Lopez is a 47 y.o. female who presents to the emergency department secondary to concern for chest pain. Has been going on for at least a week intermittently. On arrival she is awake, alert, oriented with vitals that are notable for mild tachycardia in the low 100s otherwise hemodynamically stable and within normal limits. On exam she has mild tenderness palpation of the chest wall though it is not reliably reproducible. She does have a small bump noted as well just inferior to the eye tattoo on the left side of her chest wall though states her pain is more inferior to this. Her abdomen is benign, her lungs are clear, she has no peripheral edema. Peripheral IV was placed, labs ordered along with EKG. We discussed doing a chest x-ray though our chest x-ray machine currently is down and would only be able to get a portable.   Given her mild tachycardia and that the pain is sometimes worse with inspiration there is potential for also a blood clot. Given that she had a CT scan ordered. Ordered Toradol and Tylenol. Labs returned notable for mild anemia hemoglobin 11.3 it appears her baseline is around 12. Renal panel is unremarkable. Troponin is negative. Given the length of time her chest pain has been going on only one was ordered. CT scan returned negative. Discussed results with the patient who was sleeping comfortably at that time and aroused easily to my voice. She stated her symptoms had completely resolved since receiving the pain medication. Exam at that time remained benign. We discussed following up with her primary care and she was given information both for the one she had been seeing as well as for referral to a new one. Discussed she will also need to follow-up with them for her anemia. We encouraged her to continue thinking about smoking cessation. Prior to discharge discussed return precautions as well which she was able to verbalize understanding of. FINAL IMPRESSION      1. Chest wall pain    2. Chest pain, unspecified type    3. Anemia, unspecified type    4.  Current smoker        DISPOSITION/PLAN   DISPOSITION        PATIENT REFERRED TO:  Jeff Roque MD  [address]  [address] [place] [address]  [phone]    Schedule an appointment as soon as possible for a visit   For follow up appointment    [facility]  [phone]          DISCHARGE MEDICATIONS:  New Prescriptions    No medications on file            (Please note that portions of this note were completed with a voice recognition program. Efforts were made to edit the dictations but occasionally words are mis-transcribed.)    Bridget Cooley MD (electronically signed)  Attending Emergency Physician         Bridget Cooley MD  09/29/20 4111

## 2020-11-25 ENCOUNTER — HOSPITAL ENCOUNTER (EMERGENCY)
Age: 40
Discharge: HOME | End: 2020-11-25
Attending: EMERGENCY MEDICINE
Payer: MEDICAID

## 2020-11-25 ENCOUNTER — APPOINTMENT (OUTPATIENT)
Dept: CT IMAGING | Age: 40
End: 2020-11-25
Payer: MEDICAID

## 2020-11-25 VITALS
RESPIRATION RATE: 22 BRPM | OXYGEN SATURATION: 100 % | DIASTOLIC BLOOD PRESSURE: 90 MMHG | HEART RATE: 71 BPM | WEIGHT: 121.47 LBS | SYSTOLIC BLOOD PRESSURE: 150 MMHG | TEMPERATURE: 97.4 F | BODY MASS INDEX: 20.21 KG/M2

## 2020-11-25 LAB
A/G RATIO: 1.8 (ref 1.1–2.2)
ALBUMIN SERPL-MCNC: 5.3 G/DL (ref 3.4–5)
ALP BLD-CCNC: 95 U/L (ref 40–129)
ALT SERPL-CCNC: 7 U/L (ref 10–40)
AMORPHOUS: ABNORMAL /HPF
AMPHETAMINE SCREEN, URINE: ABNORMAL
ANION GAP SERPL CALCULATED.3IONS-SCNC: 15 MMOL/L (ref 3–16)
AST SERPL-CCNC: 17 U/L (ref 15–37)
BARBITURATE SCREEN URINE: ABNORMAL
BASOPHILS ABSOLUTE: 0 K/UL (ref 0–0.2)
BASOPHILS RELATIVE PERCENT: 0.4 %
BENZODIAZEPINE SCREEN, URINE: ABNORMAL
BILIRUB SERPL-MCNC: 0.5 MG/DL (ref 0–1)
BILIRUBIN URINE: ABNORMAL
BLOOD, URINE: NEGATIVE
BUN BLDV-MCNC: 9 MG/DL (ref 7–20)
CALCIUM SERPL-MCNC: 10 MG/DL (ref 8.3–10.6)
CANNABINOID SCREEN URINE: POSITIVE
CHLORIDE BLD-SCNC: 101 MMOL/L (ref 99–110)
CLARITY: CLEAR
CO2: 25 MMOL/L (ref 21–32)
COCAINE METABOLITE SCREEN URINE: ABNORMAL
COLOR: YELLOW
CREAT SERPL-MCNC: <0.5 MG/DL (ref 0.6–1.1)
EKG ATRIAL RATE: 39 BPM
EKG DIAGNOSIS: NORMAL
EKG Q-T INTERVAL: 480 MS
EKG QRS DURATION: 72 MS
EKG QTC CALCULATION (BAZETT): 483 MS
EKG R AXIS: 69 DEGREES
EKG T AXIS: 7 DEGREES
EKG VENTRICULAR RATE: 61 BPM
EOSINOPHILS ABSOLUTE: 0 K/UL (ref 0–0.6)
EOSINOPHILS RELATIVE PERCENT: 0 %
EPITHELIAL CELLS, UA: ABNORMAL /HPF (ref 0–5)
GFR AFRICAN AMERICAN: >60
GFR NON-AFRICAN AMERICAN: >60
GLOBULIN: 3 G/DL
GLUCOSE BLD-MCNC: 159 MG/DL (ref 70–99)
GLUCOSE URINE: NEGATIVE MG/DL
HCG(URINE) PREGNANCY TEST: NEGATIVE
HCT VFR BLD CALC: 37.7 % (ref 36–48)
HEMOGLOBIN: 12.8 G/DL (ref 12–16)
KETONES, URINE: 40 MG/DL
LEUKOCYTE ESTERASE, URINE: NEGATIVE
LIPASE: 19 U/L (ref 13–60)
LYMPHOCYTES ABSOLUTE: 1 K/UL (ref 1–5.1)
LYMPHOCYTES RELATIVE PERCENT: 17.7 %
Lab: ABNORMAL
MAGNESIUM: 1.6 MG/DL (ref 1.8–2.4)
MCH RBC QN AUTO: 31.9 PG (ref 26–34)
MCHC RBC AUTO-ENTMCNC: 33.9 G/DL (ref 31–36)
MCV RBC AUTO: 94.1 FL (ref 80–100)
METHADONE SCREEN, URINE: ABNORMAL
MICROSCOPIC EXAMINATION: YES
MONOCYTES ABSOLUTE: 0.2 K/UL (ref 0–1.3)
MONOCYTES RELATIVE PERCENT: 3.4 %
MUCUS: ABNORMAL /LPF
NEUTROPHILS ABSOLUTE: 4.5 K/UL (ref 1.7–7.7)
NEUTROPHILS RELATIVE PERCENT: 78.5 %
NITRITE, URINE: NEGATIVE
OPIATE SCREEN URINE: ABNORMAL
OXYCODONE URINE: ABNORMAL
PDW BLD-RTO: 13.3 % (ref 12.4–15.4)
PH UA: 6.5
PH UA: 6.5 (ref 5–8)
PHENCYCLIDINE SCREEN URINE: ABNORMAL
PLATELET # BLD: 219 K/UL (ref 135–450)
PMV BLD AUTO: 7.9 FL (ref 5–10.5)
POTASSIUM REFLEX MAGNESIUM: 3.1 MMOL/L (ref 3.5–5.1)
PROPOXYPHENE SCREEN: ABNORMAL
PROTEIN UA: ABNORMAL MG/DL
RBC # BLD: 4.01 M/UL (ref 4–5.2)
RBC UA: ABNORMAL /HPF (ref 0–4)
SODIUM BLD-SCNC: 141 MMOL/L (ref 136–145)
SPECIFIC GRAVITY UA: 1.02 (ref 1–1.03)
TOTAL PROTEIN: 8.3 G/DL (ref 6.4–8.2)
URINE REFLEX TO CULTURE: ABNORMAL
URINE TYPE: ABNORMAL
UROBILINOGEN, URINE: 0.2 E.U./DL
WBC # BLD: 5.7 K/UL (ref 4–11)
WBC UA: ABNORMAL /HPF (ref 0–5)

## 2020-11-25 PROCEDURE — 74177 CT ABD & PELVIS W/CONTRAST: CPT

## 2020-11-25 PROCEDURE — 6370000000 HC RX 637 (ALT 250 FOR IP): Performed by: EMERGENCY MEDICINE

## 2020-11-25 PROCEDURE — 85025 COMPLETE CBC W/AUTO DIFF WBC: CPT

## 2020-11-25 PROCEDURE — 6360000004 HC RX CONTRAST MEDICATION: Performed by: EMERGENCY MEDICINE

## 2020-11-25 PROCEDURE — 96375 TX/PRO/DX INJ NEW DRUG ADDON: CPT

## 2020-11-25 PROCEDURE — 80053 COMPREHEN METABOLIC PANEL: CPT

## 2020-11-25 PROCEDURE — 93005 ELECTROCARDIOGRAM TRACING: CPT | Performed by: EMERGENCY MEDICINE

## 2020-11-25 PROCEDURE — 2580000003 HC RX 258: Performed by: EMERGENCY MEDICINE

## 2020-11-25 PROCEDURE — 84703 CHORIONIC GONADOTROPIN ASSAY: CPT

## 2020-11-25 PROCEDURE — 96365 THER/PROPH/DIAG IV INF INIT: CPT

## 2020-11-25 PROCEDURE — 36415 COLL VENOUS BLD VENIPUNCTURE: CPT

## 2020-11-25 PROCEDURE — 96361 HYDRATE IV INFUSION ADD-ON: CPT

## 2020-11-25 PROCEDURE — 83690 ASSAY OF LIPASE: CPT

## 2020-11-25 PROCEDURE — 6360000002 HC RX W HCPCS: Performed by: EMERGENCY MEDICINE

## 2020-11-25 PROCEDURE — 81001 URINALYSIS AUTO W/SCOPE: CPT

## 2020-11-25 PROCEDURE — 93010 ELECTROCARDIOGRAM REPORT: CPT | Performed by: INTERNAL MEDICINE

## 2020-11-25 PROCEDURE — 80307 DRUG TEST PRSMV CHEM ANLYZR: CPT

## 2020-11-25 PROCEDURE — 99284 EMERGENCY DEPT VISIT MOD MDM: CPT

## 2020-11-25 PROCEDURE — 83735 ASSAY OF MAGNESIUM: CPT

## 2020-11-25 RX ORDER — ONDANSETRON 2 MG/ML
4 INJECTION INTRAMUSCULAR; INTRAVENOUS ONCE
Status: DISCONTINUED | OUTPATIENT
Start: 2020-11-25 | End: 2020-11-25 | Stop reason: HOSPADM

## 2020-11-25 RX ORDER — ONDANSETRON 4 MG/1
4 TABLET, ORALLY DISINTEGRATING ORAL EVERY 8 HOURS PRN
Qty: 20 TABLET | Refills: 0 | Status: SHIPPED | OUTPATIENT
Start: 2020-11-25 | End: 2020-12-24 | Stop reason: SDUPTHER

## 2020-11-25 RX ORDER — POTASSIUM CHLORIDE 750 MG/1
10 TABLET, FILM COATED, EXTENDED RELEASE ORAL ONCE
Status: COMPLETED | OUTPATIENT
Start: 2020-11-25 | End: 2020-11-25

## 2020-11-25 RX ORDER — KETOROLAC TROMETHAMINE 30 MG/ML
30 INJECTION, SOLUTION INTRAMUSCULAR; INTRAVENOUS ONCE
Status: COMPLETED | OUTPATIENT
Start: 2020-11-25 | End: 2020-11-25

## 2020-11-25 RX ORDER — DIPHENHYDRAMINE HYDROCHLORIDE 50 MG/ML
25 INJECTION INTRAMUSCULAR; INTRAVENOUS ONCE
Status: COMPLETED | OUTPATIENT
Start: 2020-11-25 | End: 2020-11-25

## 2020-11-25 RX ORDER — POTASSIUM CHLORIDE 7.45 MG/ML
20 INJECTION INTRAVENOUS ONCE
Status: DISCONTINUED | OUTPATIENT
Start: 2020-11-25 | End: 2020-11-25

## 2020-11-25 RX ORDER — METOCLOPRAMIDE HYDROCHLORIDE 5 MG/ML
10 INJECTION INTRAMUSCULAR; INTRAVENOUS ONCE
Status: COMPLETED | OUTPATIENT
Start: 2020-11-25 | End: 2020-11-25

## 2020-11-25 RX ORDER — POTASSIUM CHLORIDE 7.45 MG/ML
10 INJECTION INTRAVENOUS
Status: ACTIVE | OUTPATIENT
Start: 2020-11-25 | End: 2020-11-25

## 2020-11-25 RX ORDER — 0.9 % SODIUM CHLORIDE 0.9 %
1000 INTRAVENOUS SOLUTION INTRAVENOUS ONCE
Status: DISCONTINUED | OUTPATIENT
Start: 2020-11-25 | End: 2020-11-25 | Stop reason: HOSPADM

## 2020-11-25 RX ORDER — 0.9 % SODIUM CHLORIDE 0.9 %
1000 INTRAVENOUS SOLUTION INTRAVENOUS ONCE
Status: COMPLETED | OUTPATIENT
Start: 2020-11-25 | End: 2020-11-25

## 2020-11-25 RX ORDER — MORPHINE SULFATE 4 MG/ML
4 INJECTION, SOLUTION INTRAMUSCULAR; INTRAVENOUS ONCE
Status: COMPLETED | OUTPATIENT
Start: 2020-11-25 | End: 2020-11-25

## 2020-11-25 RX ORDER — ONDANSETRON 4 MG/1
4 TABLET, ORALLY DISINTEGRATING ORAL ONCE
Status: COMPLETED | OUTPATIENT
Start: 2020-11-25 | End: 2020-11-25

## 2020-11-25 RX ORDER — MAGNESIUM SULFATE 1 G/100ML
1 INJECTION INTRAVENOUS ONCE
Status: COMPLETED | OUTPATIENT
Start: 2020-11-25 | End: 2020-11-25

## 2020-11-25 RX ORDER — FAMOTIDINE 20 MG/1
20 TABLET, FILM COATED ORAL 2 TIMES DAILY
Qty: 60 TABLET | Refills: 0 | Status: SHIPPED | OUTPATIENT
Start: 2020-11-25 | End: 2021-05-13 | Stop reason: CLARIF

## 2020-11-25 RX ORDER — PROMETHAZINE HYDROCHLORIDE 25 MG/ML
25 INJECTION, SOLUTION INTRAMUSCULAR; INTRAVENOUS ONCE
Status: COMPLETED | OUTPATIENT
Start: 2020-11-25 | End: 2020-11-25

## 2020-11-25 RX ADMIN — PROMETHAZINE HYDROCHLORIDE 25 MG: 25 INJECTION INTRAMUSCULAR; INTRAVENOUS at 05:08

## 2020-11-25 RX ADMIN — SODIUM CHLORIDE 1000 ML: 9 INJECTION, SOLUTION INTRAVENOUS at 05:54

## 2020-11-25 RX ADMIN — IOVERSOL 100 ML: 678 INJECTION INTRA-ARTERIAL; INTRAVENOUS at 06:44

## 2020-11-25 RX ADMIN — KETOROLAC TROMETHAMINE 30 MG: 30 INJECTION, SOLUTION INTRAMUSCULAR at 05:08

## 2020-11-25 RX ADMIN — DIPHENHYDRAMINE HYDROCHLORIDE 25 MG: 50 INJECTION, SOLUTION INTRAMUSCULAR; INTRAVENOUS at 08:00

## 2020-11-25 RX ADMIN — METOCLOPRAMIDE HYDROCHLORIDE 10 MG: 5 INJECTION INTRAMUSCULAR; INTRAVENOUS at 07:35

## 2020-11-25 RX ADMIN — MORPHINE SULFATE 4 MG: 4 INJECTION, SOLUTION INTRAMUSCULAR; INTRAVENOUS at 06:31

## 2020-11-25 RX ADMIN — ONDANSETRON 4 MG: 4 TABLET, ORALLY DISINTEGRATING ORAL at 04:11

## 2020-11-25 RX ADMIN — POTASSIUM CHLORIDE 10 MEQ: 750 TABLET, FILM COATED, EXTENDED RELEASE ORAL at 10:04

## 2020-11-25 RX ADMIN — MAGNESIUM SULFATE HEPTAHYDRATE 1 G: 1 INJECTION, SOLUTION INTRAVENOUS at 07:41

## 2020-11-25 ASSESSMENT — PAIN SCALES - GENERAL
PAINLEVEL_OUTOF10: 10
PAINLEVEL_OUTOF10: 9
PAINLEVEL_OUTOF10: 10

## 2020-11-25 ASSESSMENT — PAIN DESCRIPTION - LOCATION: LOCATION: ABDOMEN

## 2020-11-25 ASSESSMENT — PAIN DESCRIPTION - DESCRIPTORS: DESCRIPTORS: ACHING;CRAMPING

## 2020-11-25 NOTE — ED PROVIDER NOTES
[address] [facility]  eMERGENCY dEPARTMENTeNCOUnter      Pt Name: Shin Jacobson  MRN: [id number]  [place] 1980  Date of evaluation: 11/25/2020  Provider: Laura Cleveland MD    CHIEF COMPLAINT       Chief Complaint   Patient presents with    Abdominal Pain     abdominal pain and cramping. pt dry heaving in room.  Nausea         HISTORY OF PRESENT ILLNESS   (Location/Symptom, Timing/Onset,Context/Setting, Quality, Duration, Modifying Factors, Severity)  Note limiting factors. Shin Jacobson is a 47 y.o. female who presents to the emergency department for acute onset of nausea and vomiting associated of cramping abdominal pain which started earlier this evening. She has had several episodes of vomiting. She states that she is also having diarrhea. This is similar to the pain that she has had a couple of months ago when she was told she has colitis. No fevers. No urinary complaints. She states that there has been blood in her stools. She does admit to smoking marijuana but states that her vomiting is not usually associated with this. No chest pain. No difficulty breathing. Nursing notes were reviewed. REVIEW OF SYSTEMS    (2-9 systems for level 4, 10 or more for level 5)     Review of Systems    Positive and pertinent negative as per HPI. Except as noted above in the ROS, all other systems were reviewed and were negative.     PAST MEDICAL HISTORY     Past Medical History:   Diagnosis Date    Anemia     Scabies     Tobacco dependence          SURGICALHISTORY       Past Surgical History:   Procedure Laterality Date    TYMPANOSTOMY TUBE PLACEMENT           CURRENT MEDICATIONS       Previous Medications    ACETAMINOPHEN (APAP EXTRA STRENGTH) 500 MG TABLET    Take 1 tablet by mouth every 6 hours as needed for Pain    DICYCLOMINE (BENTYL) 10 MG CAPSULE    Take 1 capsule by mouth every 6 hours as needed (cramps)    FAMOTIDINE (PEPCID) 20 MG TABLET    Take 1 tablet by mouth 2 times daily    MAGNESIUM OXIDE (MAGOX 400) 400 (241.3 MG) MG TABS TABLET    Take 1 tablet by mouth 2 times daily    NAPROXEN (NAPROSYN) 500 MG TABLET    Take 1 tablet by mouth 2 times daily (with meals)    ONDANSETRON (ZOFRAN ODT) 4 MG DISINTEGRATING TABLET    Take 1 tablet by mouth every 8 hours as needed for Nausea    POTASSIUM & SODIUM PHOSPHATES (PHOS-NAK) 280-160-250 MG PACK    Take 1 packet by mouth 4 times daily       ALLERGIES     Patient has no known allergies.     FAMILY HISTORY       Family History   Problem Relation Age of Onset    High Blood Pressure Mother     Diabetes Maternal Grandmother     Stroke Maternal Grandmother           SOCIAL HISTORY       Social History     Socioeconomic History    Marital status: Single     Spouse name: None    Number of children: None    Years of education: None    Highest education level: None   Occupational History    None   Social Needs    Financial resource strain: None    Food insecurity     Worry: None     Inability: None    Transportation needs     Medical: None     Non-medical: None   Tobacco Use    Smoking status: Current Every Day Smoker     Packs/day: 0.50    Smokeless tobacco: Never Used   Substance and Sexual Activity    Alcohol use: Yes     Comment: 1-2 weekly    Drug use: Yes     Types: Marijuana     Comment: every other day     Sexual activity: Yes   Lifestyle    Physical activity     Days per week: None     Minutes per session: None    Stress: None   Relationships    Social connections     Talks on phone: None     Gets together: None     Attends Rastafarian service: None     Active member of club or organization: None     Attends meetings of clubs or organizations: None     Relationship status: None    Intimate partner violence     Fear of current or ex partner: None     Emotionally abused: None     Physically abused: None     Forced sexual activity: None   Other Topics Concern    None   Social History Narrative    None SCREENINGS             PHYSICAL EXAM    (up to 7 for level 4, 8 or more for level 5)     ED Triage Vitals [11/25/20 0340]   BP Temp Temp Source Pulse Resp SpO2 Height Weight   (!) 148/109 97.4 °F (36.3 °C) Oral 74 12 100 % -- 121 lb 7.6 oz (55.1 kg)       Physical Exam  Vitals signs and nursing note reviewed. Constitutional:       General: She is in acute distress. Appearance: Normal appearance. She is diaphoretic. She is not ill-appearing. Comments: Very thin built adult female   HENT:      Head: Normocephalic and atraumatic. Right Ear: External ear normal.      Left Ear: External ear normal.      Nose: Nose normal.   Eyes:      General: No scleral icterus. Right eye: No discharge. Left eye: No discharge. Conjunctiva/sclera: Conjunctivae normal.   Neck:      Musculoskeletal: Neck supple. Cardiovascular:      Rate and Rhythm: Normal rate. Pulmonary:      Effort: Pulmonary effort is normal. No respiratory distress. Abdominal:      General: Abdomen is flat. Bowel sounds are increased. Tenderness: There is generalized abdominal tenderness. There is no guarding. Hernia: No hernia is present. Skin:     Coloration: Skin is not pale.    Psychiatric:         Mood and Affect: Mood normal.         Behavior: Behavior normal.           DIAGNOSTIC RESULTS     EKG: All EKG's are interpreted by the Emergency Department Physician who either signs or Co-signs this chart in the absence of a cardiologist.    12 lead EKG shows normal sinus rhythm at a rate of 61 bpm, NV interval QRS QTC normal.    RADIOLOGY:   Non-plain film images such as CT, Ultrasound and MRI are read by the radiologist. Plain radiographic images are visualized and preliminarily interpreted by the emergency physician with the below findings:        Interpretation per the Radiologist below, if available at the time of this note:    CT ABDOMEN PELVIS W IV CONTRAST Additional Contrast? None    (Results Pending)         ED BEDSIDE ULTRASOUND:   Performed by ED Physician - none    LABS:  Labs Reviewed   URINE RT REFLEX TO CULTURE - Abnormal; Notable for the following components:       Result Value    Bilirubin Urine SMALL (*)     Ketones, Urine 40 (*)     Protein, UA TRACE (*)     All other components within normal limits    Narrative:     Performed at:  [facility]  [address] [place], [place]   Phone [phone]   MICROSCOPIC URINALYSIS - Abnormal; Notable for the following components:    Mucus, UA 1+ (*)     All other components within normal limits    Narrative:     Performed at:  [facility]  [address] [place], [place]   Phone [phone]   COMPREHENSIVE METABOLIC PANEL W/ REFLEX TO MG FOR LOW K - Abnormal; Notable for the following components:    Potassium reflex Magnesium 3.1 (*)     Glucose 159 (*)     CREATININE <0.5 (*)     Total Protein 8.3 (*)     Alb 5.3 (*)     ALT 7 (*)     All other components within normal limits    Narrative:     Performed at:  [facility]  [address] [place], [place]   Phone [phone]   URINE DRUG SCREEN - Abnormal; Notable for the following components:    Cannabinoid Scrn, Ur POSITIVE (*)     All other components within normal limits    Narrative:     Performed at:  [facility]  [address] [place], [place]   Phone [phone]   MAGNESIUM - Abnormal; Notable for the following components:    Magnesium 1.60 (*)     All other components within normal limits    Narrative:     Performed at:  [facility]  [address] [place], [place]   Phone [phone]   PREGNANCY, URINE    Narrative:     Performed at:  [address] [place] [address] Laboratory  [address] [facility] [address]   Phone [phone]   CBC WITH AUTO DIFFERENTIAL    Narrative:     Performed at:  [address] [facility]  [address] [place] [facility]   Phone [phone]   LIPASE    Narrative:     Performed at:  [address] [facility]  [address] [place], [facility]   Phone [phone]       All other labs were within normal range or not returned as of this dictation. EMERGENCY DEPARTMENT COURSE and DIFFERENTIAL DIAGNOSIS/MDM:   Vitals:    Vitals:    11/25/20 0340   BP: (!) 148/109   Pulse: 74   Resp: 12   Temp: 97.4 °F (36.3 °C)   TempSrc: Oral   SpO2: 100%   Weight: 121 lb 7.6 oz (55.1 kg)       Adult female who comes in with generalized abdominal pain nausea or vomiting. While here in the emergency room the patient had one episode of bright red blood per rectum. This was just a scant amount. Laboratory studies were ordered as well as a CT scan. She has gotten several doses of medications for pain and vomiting. An EKG was ordered to evaluate for baseline QT C. Patient is given IV fluids. Laboratory studies show hypokalemia and hypomagnesemia. Also shows ketonuria. CT scan is still pending. I have signed out [facility] Emergency Department care to DR. Eldon Ac. We discussed the pertinent history, physical exam, completed/pending test results (if applicable) and current treatment plan. Please refer to his/her chart for the patients remaining Emergency Department course and final disposition. CRITICAL CARE TIME   None       CONSULTS:  None    PROCEDURES:  Unless otherwise noted above, none     Procedures    FINAL IMPRESSION      1. Intractable vomiting with nausea, unspecified vomiting type    2. Generalized abdominal pain    3. Hypokalemia    4. Hypomagnesemia          DISPOSITION/PLAN   DISPOSITION        PATIENT REFERREDTO:  No follow-up provider specified.     DISCHARGEMEDICATIONS:  Andreia Guevara Prescriptions    No medications on file          (Please note that portions of this note were completed with a voice recognition program.  Efforts were made to edit the dictations but occasionally words are mis-transcribed.)    Stefania Medel MD (electronically signed)  Attending Emergency Physician       Stefania Medel MD  11/25/20 5760

## 2020-11-25 NOTE — ED PROVIDER NOTES
Attending Note:    The patient was seen and examined by Dr. Huey Uriostegui earlier this morning in the emergency department. At the time of change of shift, final disposition is pending. CT results are pending. I was asked to follow-up on CT results and make final disposition of the patient. I also completed a face-to-face examination. HPI: G[place] Rock is a 47 y.o. female who presents to the emergency department with a complaint of nausea vomiting and diarrhea that began suddenly at 1:30 AM this morning. She states that after receiving medication she is feeling much improved. She denies any recent illness. She had a similar occurrence with colitis several months ago. She does not take any current antibiotics. No exposure to illness. No exposure to COVID-19. No recent travel history. She denies any melena, hematochezia, or hematemesis. She denies any seafood ingestion. She denies any chest pain or shortness of breath. No cough or cold symptoms. She denies any fever, chills, earache, headache, sore throat or sinus drainage. She states that she was at work when her symptoms started. She felt fine prior to the onset of her symptoms. Physical Exam:     Constitutional: No apparent distress. Resting comfortably. Head: No visible evidence of trauma. Normocephalic. Eyes: Pupils equal and reactive. No photophobia. Conjunctiva normal.    HENT: Oral mucosa moist.  Airway patent. Neck:  Soft and supple. Nontender. Heart:  Regular rate and rhythm. No murmur. Lungs:  Clear to auscultation. No wheezes, rales, or ronchi. No conversational dyspnea or accessory muscle use. Abdomen:  Soft, non-distended. Nontender. No guarding rigidity or rebound. Unable to elicit any abdominal discomfort to palpation. Musculoskeletal: Extremities non-tender with full range of motion. Neurological: Alert and oriented x 3. Speech clear. No acute focal motor or sensory deficits. Skin: Skin is warm and dry.  No rash.   Psychiatric: Normal mood and affect. Behavior is normal.     DIAGNOSTIC RESULTS     EKG: All EKG's are interpreted by the Emergency Department Physician who either signs or Co-signs this chart in the absence of a cardiologist.    Normal sinus rhythm with PACs. Baseline artifact was noted. Rate 61. QRS duration 72 ms. QTc 483 ms. R axis 69 degrees. No ST elevation. No acute change. RADIOLOGY:   Non-plain film images such as CT, Ultrasound and MRI are read by the radiologist. Plain radiographic images are visualized and preliminarily interpreted by the emergency physician with the below findings:        Interpretation per the Radiologist below, if available at the time of this note:    CT ABDOMEN PELVIS W IV CONTRAST Additional Contrast? None   Final Result   Diverticulosis with chronic stable pancolitis.                ED BEDSIDE ULTRASOUND:   Performed by ED Physician - none    LABS:  Labs Reviewed   URINE RT REFLEX TO CULTURE - Abnormal; Notable for the following components:       Result Value    Bilirubin Urine SMALL (*)     Ketones, Urine 40 (*)     Protein, UA TRACE (*)     All other components within normal limits    Narrative:     Performed at:  [facility]  [address] [facility], [facility]   Phone [phone]   MICROSCOPIC URINALYSIS - Abnormal; Notable for the following components:    Mucus, UA 1+ (*)     All other components within normal limits    Narrative:     Performed at:  [facility]  [address] [facility], [facility]   Phone [phone]   COMPREHENSIVE METABOLIC PANEL W/ REFLEX TO MG FOR LOW K - Abnormal; Notable for the following components:    Potassium reflex Magnesium 3.1 (*)     Glucose 159 (*)     CREATININE <0.5 (*)     Total Protein 8.3 (*)     Alb 5.3 (*)     ALT 7 (*)     All other components within normal limits    Narrative:     Performed at:  [facility]  [address], [place]   Phone [phone]   URINE DRUG SCREEN - Abnormal; Notable for the following components:    Cannabinoid Scrn, Ur POSITIVE (*)     All other components within normal limits    Narrative:     Performed at:  [facility]  [address], [place]   Phone [phone]   MAGNESIUM - Abnormal; Notable for the following components:    Magnesium 1.60 (*)     All other components within normal limits    Narrative:     Performed at:  [facility]  [address], [place]   Phone [phone]   PREGNANCY, URINE    Narrative:     Performed at:  [address] [facility]  [address], [place]   Phone [phone]   CBC WITH AUTO DIFFERENTIAL    Narrative:     Performed at:  [address] [facility]  [address], [place]   Phone [phone]   LIPASE    Narrative:     Performed at:  [address] [facility]  [address], [place]   Phone [phone]       All other labs were within normal range or not returned as of this dictation. EMERGENCY DEPARTMENT COURSE and DIFFERENTIAL DIAGNOSIS/MDM:   Vitals:    Vitals:    11/25/20 0340   BP: (!) 148/109   Pulse: 74   Resp: 12   Temp: 97.4 °F (36.3 °C)   TempSrc: Oral   SpO2: 100%   Weight: 121 lb 7.6 oz (55.1 kg)       The patient presented with sudden onset of nausea vomiting diarrhea this morning at 1:30 AM.  She had multiple episodes of vomiting, some of them witnessed to be self-induced in the emergency department by nursing staff. However, at the time of my examination she is no longer vomiting. She did complain of feeling a little bit restless which may be secondary to the Reglan and Phenergan. She was given Benadryl 25 mg IV. Her abdomen is soft and nontender. She is afebrile. After the last episode of diarrhea in the emergency department, she noticed a small amount of red streaks in the stool. No gross blood. Hemoglobin is 12.8. BUN is 9. Creatinine 0.5. Potassium 3.1. Medium 1.6. She was given potassium and magnesium in the emergency department. She received IV fluids. CT abdomen pelvis was reviewed. CT abdomen pelvis revealed no acute findings. There was some stable chronic fatty infiltration of the submucosa of the colon consistent with chronic colitis. No report of any acute inflammatory changes. 9:01 AM: The patient has not had any further vomiting. She is significantly improved. She is stable for discharge and outpatient management. She will be given a referral to gastroenterology and was advised to follow-up in 1 to 2 days for reexamination. She may need further evaluation with colonoscopy. Differential diagnosis would include acute viral syndrome, gastroenteritis, colitis, inflammatory bowel disease. She will be given a prescription for Zofran and Pepcid. Drink plenty of fluids. Follow-up with a primary care physician in 1 to 2 days for reexamination. Follow a clear liquid diet for 24 hours. Advance to a bland diet as tolerated. If condition worsens or new symptoms develop, return immediately to the emergency department. MDM      CRITICAL CARE TIME   Total Critical Care time was 0 minutes, excluding separately reportable procedures. There was a high probability of clinically significant/life threatening deterioration in the patient's condition which required my urgent intervention. CONSULTS:  None    PROCEDURES:  Unless otherwise noted below, none     Procedures        FINAL IMPRESSION      1. Nausea vomiting and diarrhea    2. Generalized abdominal pain    3. Hypokalemia    4.  Hypomagnesemia          DISPOSITION/PLAN   DISPOSITION        PATIENT REFERRED TO:  Tania Nicholas, [address] [facility] [address] [address]  [phone]    Call today      [facility] Referral  Call [phone] for an appointment  Call today        DISCHARGE MEDICATIONS:  New Prescriptions    FAMOTIDINE (PEPCID) 20 MG TABLET    Take 1 tablet by mouth 2 times daily    ONDANSETRON (ZOFRAN ODT) 4 MG DISINTEGRATING TABLET    Take 1 tablet by mouth every 8 hours as needed for Nausea     Controlled Substances Monitoring:     No flowsheet data found. (Please note that portions of this note were completed with a voice recognition program.  Efforts were made to edit the dictations but occasionally words are mis-transcribed. )    [address] Randall Najera DO (electronically signed)  Attending Emergency Physician     Debbie Damon DO  11/25/20 9720

## 2020-11-25 NOTE — ED NOTES
Pt medicated for nausea  Pt keeps requesting ice chips  Given oral mouth sponge once     Moise Decker RN  11/25/20 0229

## 2020-11-25 NOTE — ED NOTES
IV placement attempted x2 without success. Dr. Camila Mcmahan notified and gave verbal order to give medications IM.       Robert Hidalgo RN  11/25/20 3184

## 2020-11-25 NOTE — ED NOTES
Pt asked RN to come in bathroom to look at her bowel movement. Small amount of bright red colored sediment in toilet. Dr. Reva Yi notified and went into bathroom to look at stool.      Mik Moreira RN  11/25/20 5208 White Javier, RN  11/25/20 1965

## 2020-11-25 NOTE — ED NOTES
Attempted IV insertion x 3 without success. Pt rina well. EMD informed.       Luiza Sarkar, RN  11/25/20 7788

## 2020-11-25 NOTE — ED NOTES
Pt sticking her finger down her throat to inducing vomiting. Pt says it makes her stomach feels better. Dr. Mary Soni notified.       [address], ZINA  11/25/20 [address] Mary RN  11/25/20 5928

## 2020-11-25 NOTE — ED NOTES
Dr. Garrett Persons notified pt says her pain is a 9/10 and asking for something for nausea.       Yadira Burris RN  11/25/20 2599

## 2020-11-26 ENCOUNTER — HOSPITAL ENCOUNTER (EMERGENCY)
Age: 40
Discharge: HOME | End: 2020-11-26
Attending: EMERGENCY MEDICINE
Payer: MEDICAID

## 2020-11-26 VITALS
DIASTOLIC BLOOD PRESSURE: 86 MMHG | HEIGHT: 65 IN | BODY MASS INDEX: 19.36 KG/M2 | TEMPERATURE: 97.9 F | WEIGHT: 116.18 LBS | SYSTOLIC BLOOD PRESSURE: 137 MMHG | RESPIRATION RATE: 20 BRPM | HEART RATE: 75 BPM | OXYGEN SATURATION: 100 %

## 2020-11-26 LAB
AMORPHOUS: ABNORMAL /HPF
ANION GAP SERPL CALCULATED.3IONS-SCNC: 15 MMOL/L (ref 3–16)
BACTERIA: ABNORMAL /HPF
BASOPHILS ABSOLUTE: 0 K/UL (ref 0–0.2)
BASOPHILS RELATIVE PERCENT: 0.2 %
BILIRUBIN URINE: ABNORMAL
BLOOD, URINE: ABNORMAL
BUN BLDV-MCNC: 11 MG/DL (ref 7–20)
CALCIUM SERPL-MCNC: 9.9 MG/DL (ref 8.3–10.6)
CHLORIDE BLD-SCNC: 96 MMOL/L (ref 99–110)
CLARITY: CLEAR
CO2: 22 MMOL/L (ref 21–32)
COLOR: ABNORMAL
CREAT SERPL-MCNC: <0.5 MG/DL (ref 0.6–1.1)
EOSINOPHILS ABSOLUTE: 0 K/UL (ref 0–0.6)
EOSINOPHILS RELATIVE PERCENT: 0 %
EPITHELIAL CELLS, UA: ABNORMAL /HPF (ref 0–5)
GFR AFRICAN AMERICAN: >60
GFR NON-AFRICAN AMERICAN: >60
GLUCOSE BLD-MCNC: 115 MG/DL (ref 70–99)
GLUCOSE URINE: NEGATIVE MG/DL
HCT VFR BLD CALC: 39.8 % (ref 36–48)
HEMOGLOBIN: 13.4 G/DL (ref 12–16)
KETONES, URINE: 40 MG/DL
LEUKOCYTE ESTERASE, URINE: NEGATIVE
LYMPHOCYTES ABSOLUTE: 1.4 K/UL (ref 1–5.1)
LYMPHOCYTES RELATIVE PERCENT: 20.6 %
MCH RBC QN AUTO: 31.8 PG (ref 26–34)
MCHC RBC AUTO-ENTMCNC: 33.7 G/DL (ref 31–36)
MCV RBC AUTO: 94.5 FL (ref 80–100)
MICROSCOPIC EXAMINATION: YES
MONOCYTES ABSOLUTE: 0.5 K/UL (ref 0–1.3)
MONOCYTES RELATIVE PERCENT: 7.2 %
MUCUS: ABNORMAL /LPF
NEUTROPHILS ABSOLUTE: 4.8 K/UL (ref 1.7–7.7)
NEUTROPHILS RELATIVE PERCENT: 72 %
NITRITE, URINE: NEGATIVE
PDW BLD-RTO: 13.1 % (ref 12.4–15.4)
PH UA: 5.5 (ref 5–8)
PHOSPHORUS: 2.6 MG/DL (ref 2.5–4.9)
PLATELET # BLD: 237 K/UL (ref 135–450)
PMV BLD AUTO: 8.4 FL (ref 5–10.5)
POTASSIUM REFLEX MAGNESIUM: 6.2 MMOL/L (ref 3.5–5.1)
POTASSIUM SERPL-SCNC: 3.1 MMOL/L (ref 3.5–5.1)
PROTEIN UA: 30 MG/DL
RBC # BLD: 4.21 M/UL (ref 4–5.2)
RBC UA: ABNORMAL /HPF (ref 0–4)
RENAL EPITHELIAL, UA: ABNORMAL /HPF (ref 0–1)
SODIUM BLD-SCNC: 133 MMOL/L (ref 136–145)
SPECIFIC GRAVITY UA: >=1.03 (ref 1–1.03)
URINE TYPE: ABNORMAL
UROBILINOGEN, URINE: 0.2 E.U./DL
WBC # BLD: 6.6 K/UL (ref 4–11)
WBC UA: ABNORMAL /HPF (ref 0–5)

## 2020-11-26 PROCEDURE — 96374 THER/PROPH/DIAG INJ IV PUSH: CPT

## 2020-11-26 PROCEDURE — 96375 TX/PRO/DX INJ NEW DRUG ADDON: CPT

## 2020-11-26 PROCEDURE — 85025 COMPLETE CBC W/AUTO DIFF WBC: CPT

## 2020-11-26 PROCEDURE — 84100 ASSAY OF PHOSPHORUS: CPT

## 2020-11-26 PROCEDURE — 84132 ASSAY OF SERUM POTASSIUM: CPT

## 2020-11-26 PROCEDURE — 6370000000 HC RX 637 (ALT 250 FOR IP): Performed by: EMERGENCY MEDICINE

## 2020-11-26 PROCEDURE — 36415 COLL VENOUS BLD VENIPUNCTURE: CPT

## 2020-11-26 PROCEDURE — 81001 URINALYSIS AUTO W/SCOPE: CPT

## 2020-11-26 PROCEDURE — 80048 BASIC METABOLIC PNL TOTAL CA: CPT

## 2020-11-26 PROCEDURE — 6360000002 HC RX W HCPCS: Performed by: EMERGENCY MEDICINE

## 2020-11-26 PROCEDURE — 99284 EMERGENCY DEPT VISIT MOD MDM: CPT

## 2020-11-26 RX ORDER — CIPROFLOXACIN 250 MG/1
250 TABLET, FILM COATED ORAL 2 TIMES DAILY
Qty: 20 TABLET | Refills: 0 | Status: SHIPPED | OUTPATIENT
Start: 2020-11-26 | End: 2020-12-06

## 2020-11-26 RX ORDER — FENTANYL CITRATE 50 UG/ML
25 INJECTION, SOLUTION INTRAMUSCULAR; INTRAVENOUS ONCE
Status: COMPLETED | OUTPATIENT
Start: 2020-11-26 | End: 2020-11-26

## 2020-11-26 RX ORDER — HYDROCODONE BITARTRATE AND ACETAMINOPHEN 5; 325 MG/1; MG/1
1 TABLET ORAL EVERY 6 HOURS PRN
Qty: 10 TABLET | Refills: 0 | Status: SHIPPED | OUTPATIENT
Start: 2020-11-26 | End: 2020-11-29

## 2020-11-26 RX ORDER — ONDANSETRON 2 MG/ML
4 INJECTION INTRAMUSCULAR; INTRAVENOUS ONCE
Status: COMPLETED | OUTPATIENT
Start: 2020-11-26 | End: 2020-11-26

## 2020-11-26 RX ORDER — HYDROCODONE BITARTRATE AND ACETAMINOPHEN 5; 325 MG/1; MG/1
1 TABLET ORAL ONCE
Status: COMPLETED | OUTPATIENT
Start: 2020-11-26 | End: 2020-11-26

## 2020-11-26 RX ORDER — METRONIDAZOLE 500 MG/1
500 TABLET ORAL 2 TIMES DAILY
Qty: 14 TABLET | Refills: 0 | Status: SHIPPED | OUTPATIENT
Start: 2020-11-26 | End: 2020-12-03

## 2020-11-26 RX ADMIN — HYDROCODONE BITARTRATE AND ACETAMINOPHEN 1 TABLET: 5; 325 TABLET ORAL at 14:37

## 2020-11-26 RX ADMIN — ONDANSETRON 4 MG: 2 INJECTION INTRAMUSCULAR; INTRAVENOUS at 13:02

## 2020-11-26 RX ADMIN — FENTANYL CITRATE 25 MCG: 50 INJECTION, SOLUTION INTRAMUSCULAR; INTRAVENOUS at 13:02

## 2020-11-26 ASSESSMENT — PAIN SCALES - GENERAL
PAINLEVEL_OUTOF10: 8
PAINLEVEL_OUTOF10: 3
PAINLEVEL_OUTOF10: 3
PAINLEVEL_OUTOF10: 8
PAINLEVEL_OUTOF10: 3

## 2020-11-26 ASSESSMENT — ENCOUNTER SYMPTOMS
DIARRHEA: 0
VOMITING: 1
SHORTNESS OF BREATH: 0
NAUSEA: 1

## 2020-11-26 ASSESSMENT — PAIN DESCRIPTION - DESCRIPTORS: DESCRIPTORS: ACHING;CRAMPING

## 2020-11-26 ASSESSMENT — PAIN DESCRIPTION - LOCATION: LOCATION: ABDOMEN

## 2020-11-26 NOTE — ED NOTES
Pt unable to urinate at this time to provide urine specimen. Will check back later.       Sima Cleary RN  11/26/20 6395

## 2020-11-26 NOTE — ED PROVIDER NOTES
[facility] [facility] ENCOUNTER      Pt Name: Stacy Yanez  MRN: [id number]  [place] 1980  Date of evaluation: 11/26/2020  Provider: Svitlana Gaston MD    CHIEF COMPLAINT       Chief Complaint   Patient presents with    Abdominal Pain     pt was seen yesterday for same complaint. abdominal pain that Tuesday.  Nausea         HISTORY OF PRESENT ILLNESS   (Location/Symptom, Timing/Onset, Context/Setting, Quality, Duration, Modifying Factors, Severity)  Note limiting factors. Stacy Yanez is a 47 y.o. female who presents to the emergency department crampy abdominal pain. HPI     This is a 33-year-old -American female with long history of pancolitis who presents for her second ED visit in the last 24 hours for diffuse crampy lower quadrant abdominal pain. She describes it as crampy intermittent occasionally associate with some nausea no vomiting and she is been making herself throw up. She had an extensive work-up yesterday including a CT which showed no acute intra-abdominal surgical process and negative beta and unremarkable labs. Pain about 6 or 7 out of 10. Nursing Notes were reviewed. REVIEW OF SYSTEMS    (2-9 systems for level 4, 10 or more for level 5)     Review of Systems   Constitutional: Negative for chills and fever. Respiratory: Negative for shortness of breath. Cardiovascular: Negative for chest pain. Gastrointestinal: Positive for nausea and vomiting. Negative for diarrhea. Genitourinary: Negative for vaginal bleeding and vaginal discharge. All other systems reviewed and are negative. Except as noted above the remainder of the review of systems was reviewed and negative.        PAST MEDICAL HISTORY     Past Medical History:   Diagnosis Date    Anemia     Scabies     Tobacco dependence          SURGICAL HISTORY       Past Surgical History:   Procedure Laterality Date    TYMPANOSTOMY TUBE PLACEMENT           CURRENT MEDICATIONS       Previous Medications    ACETAMINOPHEN (APAP EXTRA STRENGTH) 500 MG TABLET    Take 1 tablet by mouth every 6 hours as needed for Pain    DICYCLOMINE (BENTYL) 10 MG CAPSULE    Take 1 capsule by mouth every 6 hours as needed (cramps)    FAMOTIDINE (PEPCID) 20 MG TABLET    Take 1 tablet by mouth 2 times daily    MAGNESIUM OXIDE (MAGOX 400) 400 (241.3 MG) MG TABS TABLET    Take 1 tablet by mouth 2 times daily    NAPROXEN (NAPROSYN) 500 MG TABLET    Take 1 tablet by mouth 2 times daily (with meals)    ONDANSETRON (ZOFRAN ODT) 4 MG DISINTEGRATING TABLET    Take 1 tablet by mouth every 8 hours as needed for Nausea    POTASSIUM & SODIUM PHOSPHATES (PHOS-NAK) 280-160-250 MG PACK    Take 1 packet by mouth 4 times daily       ALLERGIES     Patient has no known allergies.     FAMILY HISTORY       Family History   Problem Relation Age of Onset    High Blood Pressure Mother     Diabetes Maternal Grandmother     Stroke Maternal Grandmother           SOCIAL HISTORY       Social History     Socioeconomic History    Marital status: Single     Spouse name: None    Number of children: None    Years of education: None    Highest education level: None   Occupational History    None   Social Needs    Financial resource strain: None    Food insecurity     Worry: None     Inability: None    Transportation needs     Medical: None     Non-medical: None   Tobacco Use    Smoking status: Current Every Day Smoker     Packs/day: 0.50    Smokeless tobacco: Never Used   Substance and Sexual Activity    Alcohol use: Yes     Comment: 1-2 weekly    Drug use: Yes     Types: Marijuana     Comment: every other day     Sexual activity: Yes   Lifestyle    Physical activity     Days per week: None     Minutes per session: None    Stress: None   Relationships    Social connections     Talks on phone: None     Gets together: None     Attends Mandaeism service: None     Active member of club or organization: None     Attends meetings of clubs or organizations: None     Relationship status: None    Intimate partner violence     Fear of current or ex partner: None     Emotionally abused: None     Physically abused: None     Forced sexual activity: None   Other Topics Concern    None   Social History Narrative    None       SCREENINGS                        PHYSICAL EXAM    (up to 7 for level 4, 8 or more for level 5)     ED Triage Vitals   BP Temp Temp Source Pulse Resp SpO2 Height Weight   11/26/20 1201 11/26/20 1201 11/26/20 1201 11/26/20 1201 11/26/20 1214 11/26/20 1214 11/26/20 1201 11/26/20 1201   (!) 139/102 97.9 °F (36.6 °C) Oral 68 20 100 % 5' 5\" (1.651 m) 116 lb 2.9 oz (52.7 kg)       Physical Exam  Constitutional:       General: She is not in acute distress. Appearance: She is well-developed. She is not ill-appearing. HENT:      Head: Normocephalic and atraumatic. Mouth/Throat:      Mouth: Mucous membranes are moist.      Pharynx: Oropharynx is clear. No pharyngeal swelling or oropharyngeal exudate. Eyes:      Extraocular Movements: Extraocular movements intact. Pupils: Pupils are equal, round, and reactive to light. Cardiovascular:      Rate and Rhythm: Normal rate and regular rhythm. Heart sounds: Normal heart sounds. No murmur. No gallop. Pulmonary:      Effort: Pulmonary effort is normal.      Breath sounds: Normal breath sounds. Abdominal:      General: Abdomen is flat. Bowel sounds are increased. There is no distension. Palpations: Abdomen is soft. Tenderness: There is abdominal tenderness in the suprapubic area. There is no guarding or rebound. Hernia: No hernia is present. Genitourinary:     Rectum: Normal.   Skin:     General: Skin is warm and dry. Capillary Refill: Capillary refill takes less than 2 seconds. Neurological:      Mental Status: She is alert.          DIAGNOSTIC RESULTS     EKG: All EKG's are interpreted by the Emergency Department Physician who either signs or Co-signs this chart in the absence of a cardiologist.        RADIOLOGY:   Non-plain film images such as CT, Ultrasound and MRI are read by the radiologist. Plain radiographic images are visualized and preliminarily interpreted by the emergency physician with the below findings:        Interpretation per the Radiologist below, if available at the time of this note:    No orders to display         ED BEDSIDE ULTRASOUND:   Performed by ED Physician - none    LABS:  Labs Reviewed   BASIC METABOLIC PANEL W/ REFLEX TO MG FOR LOW K - Abnormal; Notable for the following components:       Result Value    Sodium 133 (*)     Potassium reflex Magnesium 6.2 (*)     Chloride 96 (*)     Glucose 115 (*)     CREATININE <0.5 (*)     All other components within normal limits    Narrative:     Vaughan Spell Reyes Nearing [id number],  Chemistry results called to and read back by   Khoi Valentin, 11/26/2020  13:26, by Mauricio Walter  Performed at:  [facility]  [address] [place] [place] [facility]   Phone [phone]   URINALYSIS - Abnormal; Notable for the following components:    Color, UA DARK YELLOW (*)     Bilirubin Urine MODERATE (*)     Ketones, Urine 40 (*)     Blood, Urine TRACE-INTACT (*)     Protein, UA 30 (*)     All other components within normal limits    Narrative:     Performed at:  [facility]  [address] [place], [place] [facility]   Phone [phone]   POTASSIUM - Abnormal; Notable for the following components:    Potassium 3.1 (*)     All other components within normal limits    Narrative:     Performed at:  [facility]  [address] [place], [place] [facility]   Phone [phone]   CBC WITH AUTO DIFFERENTIAL    Narrative:     Performed at:  [facility]  [address] [place], [place] [facility]   Phone [phone]   PHOSPHORUS Narrative:     Jess Barr [phone],  Chemistry results called to and read back by   Sophie Scott, 11/26/2020  13:26, by Jovon Aguilera  Performed at:  [facility]  [address] [place], [facility]   Phone [phone]   MICROSCOPIC URINALYSIS       All other labs were within normal range or not returned as of this dictation. EMERGENCY DEPARTMENT COURSE and DIFFERENTIAL DIAGNOSIS/MDM:   Vitals:    Vitals:    11/26/20 1201 11/26/20 1214   BP: (!) 139/102    Pulse: 68    Resp:  20   Temp: 97.9 °F (36.6 °C)    TempSrc: Oral    SpO2:  100%   Weight: 116 lb 2.9 oz (52.7 kg)    Height: 5' 5\" (1.651 m)        Of history and physical exam performed. I reviewed her past medical past surgical social family history. She was given appropriate analgesia emerge department. Repeat any imaging or her urine beta is this was done within the past 24 hours. MDM   Due to the diagnosis of acute intra-abdominal surgical process however her symptoms are the same she had a CT within 24 hours and her labs are essentially unremarkable. Given that she has diffuse diverticulosis with pancolitis I will go ahead and add Cipro and Flagyl to her regimen along with some pain medication for the next couple of days. She was strictly encouraged to follow-up with her primary care physician. REASSESSMENT          CRITICAL CARE TIME       CONSULTS:  None    PROCEDURES:  Unless otherwise noted below, none     Procedures        FINAL IMPRESSION      1.  Colitis          DISPOSITION/PLAN   DISPOSITION Decision To Discharge 11/26/2020 02:15:06 PM      PATIENT REFERRED TO:    Follow-up with your primary care physician in 2 to 3 days or call [phone] for primary care referral.  In 2 days        DISCHARGE MEDICATIONS:  New Prescriptions    CIPROFLOXACIN (CIPRO) 250 MG TABLET    Take 1 tablet by mouth 2 times daily for 10 days    HYDROCODONE-ACETAMINOPHEN (NORCO) 5-325 MG PER TABLET Take 1 tablet by mouth every 6 hours as needed for Pain for up to 3 days. Intended supply: 3 days. Take lowest dose possible to manage pain    METRONIDAZOLE (FLAGYL) 500 MG TABLET    Take 1 tablet by mouth 2 times daily for 7 days     Controlled Substances Monitoring:     No flowsheet data found.     (Please note that portions of this note were completed with a voice recognition program.  Efforts were made to edit the dictations but occasionally words are mis-transcribed.)    Og Bowman MD (electronically signed)  Attending Emergency Physician         Og Bowman MD  11/26/20 2648

## 2020-11-26 NOTE — ED NOTES
Reviewed AVS and discharge home care instructions with patient. Pt verbalized understanding and had no questions at this time. Pt sent home with prescription x3.      Rachel Milton RN  11/26/20 7299

## 2020-12-24 ENCOUNTER — APPOINTMENT (OUTPATIENT)
Dept: CT IMAGING | Age: 40
DRG: 249 | End: 2020-12-24
Payer: MEDICAID

## 2020-12-24 ENCOUNTER — HOSPITAL ENCOUNTER (EMERGENCY)
Age: 40
Discharge: HOME | DRG: 249 | End: 2020-12-24
Attending: EMERGENCY MEDICINE
Payer: MEDICAID

## 2020-12-24 VITALS
SYSTOLIC BLOOD PRESSURE: 155 MMHG | RESPIRATION RATE: 16 BRPM | DIASTOLIC BLOOD PRESSURE: 80 MMHG | HEART RATE: 50 BPM | TEMPERATURE: 98.1 F | OXYGEN SATURATION: 100 %

## 2020-12-24 LAB
A/G RATIO: 1.3 (ref 1.1–2.2)
ALBUMIN SERPL-MCNC: 4 G/DL (ref 3.4–5)
ALP BLD-CCNC: 81 U/L (ref 40–129)
ALT SERPL-CCNC: 7 U/L (ref 10–40)
ANION GAP SERPL CALCULATED.3IONS-SCNC: 12 MMOL/L (ref 3–16)
AST SERPL-CCNC: 14 U/L (ref 15–37)
BASOPHILS ABSOLUTE: 0 K/UL (ref 0–0.2)
BASOPHILS RELATIVE PERCENT: 0.3 %
BILIRUB SERPL-MCNC: 0.3 MG/DL (ref 0–1)
BILIRUBIN URINE: NEGATIVE
BLOOD, URINE: NEGATIVE
BUN BLDV-MCNC: 6 MG/DL (ref 7–20)
CALCIUM SERPL-MCNC: 9.1 MG/DL (ref 8.3–10.6)
CHLORIDE BLD-SCNC: 107 MMOL/L (ref 99–110)
CLARITY: CLEAR
CO2: 21 MMOL/L (ref 21–32)
COLOR: YELLOW
CREAT SERPL-MCNC: <0.5 MG/DL (ref 0.6–1.1)
EOSINOPHILS ABSOLUTE: 0 K/UL (ref 0–0.6)
EOSINOPHILS RELATIVE PERCENT: 0.1 %
GFR AFRICAN AMERICAN: >60
GFR NON-AFRICAN AMERICAN: >60
GLOBULIN: 3 G/DL
GLUCOSE BLD-MCNC: 147 MG/DL (ref 70–99)
GLUCOSE URINE: NEGATIVE MG/DL
HCG(URINE) PREGNANCY TEST: NEGATIVE
HCT VFR BLD CALC: 37.5 % (ref 36–48)
HEMOGLOBIN: 12.1 G/DL (ref 12–16)
KETONES, URINE: ABNORMAL MG/DL
LEUKOCYTE ESTERASE, URINE: NEGATIVE
LIPASE: 14 U/L (ref 13–60)
LYMPHOCYTES ABSOLUTE: 0.8 K/UL (ref 1–5.1)
LYMPHOCYTES RELATIVE PERCENT: 18.4 %
MAGNESIUM: 1.5 MG/DL (ref 1.8–2.4)
MCH RBC QN AUTO: 31.2 PG (ref 26–34)
MCHC RBC AUTO-ENTMCNC: 32.4 G/DL (ref 31–36)
MCV RBC AUTO: 96.4 FL (ref 80–100)
MICROSCOPIC EXAMINATION: ABNORMAL
MONOCYTES ABSOLUTE: 0.1 K/UL (ref 0–1.3)
MONOCYTES RELATIVE PERCENT: 3.3 %
NEUTROPHILS ABSOLUTE: 3.3 K/UL (ref 1.7–7.7)
NEUTROPHILS RELATIVE PERCENT: 77.9 %
NITRITE, URINE: NEGATIVE
PDW BLD-RTO: 13.5 % (ref 12.4–15.4)
PH UA: 7 (ref 5–8)
PLATELET # BLD: 145 K/UL (ref 135–450)
PMV BLD AUTO: 9 FL (ref 5–10.5)
POTASSIUM REFLEX MAGNESIUM: 3.4 MMOL/L (ref 3.5–5.1)
PROTEIN UA: NEGATIVE MG/DL
RBC # BLD: 3.89 M/UL (ref 4–5.2)
SODIUM BLD-SCNC: 140 MMOL/L (ref 136–145)
SPECIFIC GRAVITY UA: 1.02 (ref 1–1.03)
TOTAL PROTEIN: 7 G/DL (ref 6.4–8.2)
URINE REFLEX TO CULTURE: ABNORMAL
URINE TYPE: ABNORMAL
UROBILINOGEN, URINE: 0.2 E.U./DL
WBC # BLD: 4.2 K/UL (ref 4–11)

## 2020-12-24 PROCEDURE — 99284 EMERGENCY DEPT VISIT MOD MDM: CPT

## 2020-12-24 PROCEDURE — 84703 CHORIONIC GONADOTROPIN ASSAY: CPT

## 2020-12-24 PROCEDURE — 80053 COMPREHEN METABOLIC PANEL: CPT

## 2020-12-24 PROCEDURE — 83690 ASSAY OF LIPASE: CPT

## 2020-12-24 PROCEDURE — 96365 THER/PROPH/DIAG IV INF INIT: CPT

## 2020-12-24 PROCEDURE — 81003 URINALYSIS AUTO W/O SCOPE: CPT

## 2020-12-24 PROCEDURE — 6360000002 HC RX W HCPCS: Performed by: EMERGENCY MEDICINE

## 2020-12-24 PROCEDURE — 74177 CT ABD & PELVIS W/CONTRAST: CPT

## 2020-12-24 PROCEDURE — 83735 ASSAY OF MAGNESIUM: CPT

## 2020-12-24 PROCEDURE — 96375 TX/PRO/DX INJ NEW DRUG ADDON: CPT

## 2020-12-24 PROCEDURE — 85025 COMPLETE CBC W/AUTO DIFF WBC: CPT

## 2020-12-24 PROCEDURE — 96372 THER/PROPH/DIAG INJ SC/IM: CPT

## 2020-12-24 PROCEDURE — 6360000004 HC RX CONTRAST MEDICATION: Performed by: EMERGENCY MEDICINE

## 2020-12-24 PROCEDURE — 36415 COLL VENOUS BLD VENIPUNCTURE: CPT

## 2020-12-24 RX ORDER — MAGNESIUM SULFATE 1 G/100ML
1 INJECTION INTRAVENOUS ONCE
Status: COMPLETED | OUTPATIENT
Start: 2020-12-24 | End: 2020-12-24

## 2020-12-24 RX ORDER — ONDANSETRON 2 MG/ML
4 INJECTION INTRAMUSCULAR; INTRAVENOUS ONCE
Status: DISCONTINUED | OUTPATIENT
Start: 2020-12-24 | End: 2020-12-24

## 2020-12-24 RX ORDER — PROMETHAZINE HYDROCHLORIDE 25 MG/ML
25 INJECTION, SOLUTION INTRAMUSCULAR; INTRAVENOUS ONCE
Status: COMPLETED | OUTPATIENT
Start: 2020-12-24 | End: 2020-12-24

## 2020-12-24 RX ORDER — DIPHENHYDRAMINE HYDROCHLORIDE 50 MG/ML
25 INJECTION INTRAMUSCULAR; INTRAVENOUS ONCE
Status: COMPLETED | OUTPATIENT
Start: 2020-12-24 | End: 2020-12-24

## 2020-12-24 RX ORDER — MORPHINE SULFATE 10 MG/ML
4 INJECTION, SOLUTION INTRAMUSCULAR; INTRAVENOUS ONCE
Status: COMPLETED | OUTPATIENT
Start: 2020-12-24 | End: 2020-12-24

## 2020-12-24 RX ORDER — ONDANSETRON 2 MG/ML
4 INJECTION INTRAMUSCULAR; INTRAVENOUS ONCE
Status: COMPLETED | OUTPATIENT
Start: 2020-12-24 | End: 2020-12-24

## 2020-12-24 RX ORDER — CIPROFLOXACIN 500 MG/1
500 TABLET, FILM COATED ORAL 2 TIMES DAILY
Qty: 14 TABLET | Refills: 0 | Status: ON HOLD | OUTPATIENT
Start: 2020-12-24 | End: 2020-12-27 | Stop reason: HOSPADM

## 2020-12-24 RX ORDER — HYDROCODONE BITARTRATE AND ACETAMINOPHEN 5; 325 MG/1; MG/1
1 TABLET ORAL EVERY 6 HOURS PRN
Qty: 12 TABLET | Refills: 0 | Status: SHIPPED | OUTPATIENT
Start: 2020-12-24 | End: 2020-12-27

## 2020-12-24 RX ORDER — MORPHINE SULFATE 4 MG/ML
4 INJECTION, SOLUTION INTRAMUSCULAR; INTRAVENOUS ONCE
Status: DISCONTINUED | OUTPATIENT
Start: 2020-12-24 | End: 2020-12-24

## 2020-12-24 RX ORDER — ONDANSETRON 4 MG/1
4 TABLET, ORALLY DISINTEGRATING ORAL EVERY 8 HOURS PRN
Qty: 20 TABLET | Refills: 0 | Status: SHIPPED | OUTPATIENT
Start: 2020-12-24 | End: 2021-01-15 | Stop reason: SDUPTHER

## 2020-12-24 RX ORDER — METRONIDAZOLE 500 MG/1
500 TABLET ORAL 2 TIMES DAILY
Qty: 14 TABLET | Refills: 0 | Status: ON HOLD | OUTPATIENT
Start: 2020-12-24 | End: 2020-12-27 | Stop reason: HOSPADM

## 2020-12-24 RX ADMIN — MAGNESIUM SULFATE HEPTAHYDRATE 1 G: 1 INJECTION, SOLUTION INTRAVENOUS at 20:52

## 2020-12-24 RX ADMIN — DIPHENHYDRAMINE HYDROCHLORIDE 25 MG: 50 INJECTION, SOLUTION INTRAMUSCULAR; INTRAVENOUS at 20:18

## 2020-12-24 RX ADMIN — IOVERSOL 100 ML: 678 INJECTION INTRA-ARTERIAL; INTRAVENOUS at 20:45

## 2020-12-24 RX ADMIN — PROMETHAZINE HYDROCHLORIDE 25 MG: 25 INJECTION INTRAMUSCULAR; INTRAVENOUS at 20:18

## 2020-12-24 RX ADMIN — MORPHINE SULFATE 4 MG: 10 INJECTION, SOLUTION INTRAMUSCULAR; INTRAVENOUS at 21:27

## 2020-12-24 RX ADMIN — MORPHINE SULFATE 4 MG: 10 INJECTION, SOLUTION INTRAMUSCULAR; INTRAVENOUS at 19:25

## 2020-12-24 RX ADMIN — ONDANSETRON 4 MG: 2 INJECTION INTRAMUSCULAR; INTRAVENOUS at 19:24

## 2020-12-24 RX ADMIN — ONDANSETRON 4 MG: 2 INJECTION INTRAMUSCULAR; INTRAVENOUS at 21:26

## 2020-12-24 ASSESSMENT — PAIN DESCRIPTION - LOCATION
LOCATION: ABDOMEN

## 2020-12-24 ASSESSMENT — PAIN DESCRIPTION - PAIN TYPE
TYPE: ACUTE PAIN

## 2020-12-24 ASSESSMENT — PAIN SCALES - GENERAL
PAINLEVEL_OUTOF10: 5
PAINLEVEL_OUTOF10: 7
PAINLEVEL_OUTOF10: 10
PAINLEVEL_OUTOF10: 4

## 2020-12-25 ENCOUNTER — HOSPITAL ENCOUNTER (INPATIENT)
Age: 40
LOS: 2 days | Discharge: HOME | DRG: 249 | End: 2020-12-27
Attending: EMERGENCY MEDICINE | Admitting: INTERNAL MEDICINE
Payer: MEDICAID

## 2020-12-25 PROBLEM — F12.90 MARIJUANA USE: Status: ACTIVE | Noted: 2020-12-25

## 2020-12-25 PROBLEM — R11.2 INTRACTABLE NAUSEA AND VOMITING: Status: ACTIVE | Noted: 2020-12-25

## 2020-12-25 LAB
A/G RATIO: 1.3 (ref 1.1–2.2)
ALBUMIN SERPL-MCNC: 4.5 G/DL (ref 3.4–5)
ALP BLD-CCNC: 91 U/L (ref 40–129)
ALT SERPL-CCNC: 7 U/L (ref 10–40)
ANION GAP SERPL CALCULATED.3IONS-SCNC: 15 MMOL/L (ref 3–16)
AST SERPL-CCNC: 15 U/L (ref 15–37)
BASOPHILS ABSOLUTE: 0 K/UL (ref 0–0.2)
BASOPHILS RELATIVE PERCENT: 0.2 %
BILIRUB SERPL-MCNC: 0.5 MG/DL (ref 0–1)
BUN BLDV-MCNC: 7 MG/DL (ref 7–20)
CALCIUM SERPL-MCNC: 9.8 MG/DL (ref 8.3–10.6)
CHLORIDE BLD-SCNC: 102 MMOL/L (ref 99–110)
CO2: 21 MMOL/L (ref 21–32)
CREAT SERPL-MCNC: 0.6 MG/DL (ref 0.6–1.1)
EOSINOPHILS ABSOLUTE: 0 K/UL (ref 0–0.6)
EOSINOPHILS RELATIVE PERCENT: 0.4 %
GFR AFRICAN AMERICAN: >60
GFR NON-AFRICAN AMERICAN: >60
GLOBULIN: 3.4 G/DL
GLUCOSE BLD-MCNC: 141 MG/DL (ref 70–99)
HCT VFR BLD CALC: 39.8 % (ref 36–48)
HEMOGLOBIN: 13.2 G/DL (ref 12–16)
LIPASE: 13 U/L (ref 13–60)
LYMPHOCYTES ABSOLUTE: 1.2 K/UL (ref 1–5.1)
LYMPHOCYTES RELATIVE PERCENT: 20.2 %
MAGNESIUM: 1.9 MG/DL (ref 1.8–2.4)
MCH RBC QN AUTO: 31.6 PG (ref 26–34)
MCHC RBC AUTO-ENTMCNC: 33.2 G/DL (ref 31–36)
MCV RBC AUTO: 95.3 FL (ref 80–100)
MONOCYTES ABSOLUTE: 0.3 K/UL (ref 0–1.3)
MONOCYTES RELATIVE PERCENT: 5.6 %
NEUTROPHILS ABSOLUTE: 4.4 K/UL (ref 1.7–7.7)
NEUTROPHILS RELATIVE PERCENT: 73.6 %
PDW BLD-RTO: 13.5 % (ref 12.4–15.4)
PLATELET # BLD: 232 K/UL (ref 135–450)
PMV BLD AUTO: 8.7 FL (ref 5–10.5)
POTASSIUM REFLEX MAGNESIUM: 3 MMOL/L (ref 3.5–5.1)
RBC # BLD: 4.17 M/UL (ref 4–5.2)
SODIUM BLD-SCNC: 138 MMOL/L (ref 136–145)
TOTAL PROTEIN: 7.9 G/DL (ref 6.4–8.2)
WBC # BLD: 6 K/UL (ref 4–11)

## 2020-12-25 PROCEDURE — 2580000003 HC RX 258: Performed by: NURSE PRACTITIONER

## 2020-12-25 PROCEDURE — 85025 COMPLETE CBC W/AUTO DIFF WBC: CPT

## 2020-12-25 PROCEDURE — 96374 THER/PROPH/DIAG INJ IV PUSH: CPT

## 2020-12-25 PROCEDURE — 99283 EMERGENCY DEPT VISIT LOW MDM: CPT

## 2020-12-25 PROCEDURE — 6360000002 HC RX W HCPCS: Performed by: NURSE PRACTITIONER

## 2020-12-25 PROCEDURE — G0378 HOSPITAL OBSERVATION PER HR: HCPCS

## 2020-12-25 PROCEDURE — 96376 TX/PRO/DX INJ SAME DRUG ADON: CPT

## 2020-12-25 PROCEDURE — 96372 THER/PROPH/DIAG INJ SC/IM: CPT

## 2020-12-25 PROCEDURE — 83735 ASSAY OF MAGNESIUM: CPT

## 2020-12-25 PROCEDURE — 6370000000 HC RX 637 (ALT 250 FOR IP): Performed by: NURSE PRACTITIONER

## 2020-12-25 PROCEDURE — 96375 TX/PRO/DX INJ NEW DRUG ADDON: CPT

## 2020-12-25 PROCEDURE — 36415 COLL VENOUS BLD VENIPUNCTURE: CPT

## 2020-12-25 PROCEDURE — 83690 ASSAY OF LIPASE: CPT

## 2020-12-25 PROCEDURE — 1200000000 HC SEMI PRIVATE

## 2020-12-25 PROCEDURE — 80053 COMPREHEN METABOLIC PANEL: CPT

## 2020-12-25 PROCEDURE — 6360000002 HC RX W HCPCS: Performed by: EMERGENCY MEDICINE

## 2020-12-25 PROCEDURE — 2580000003 HC RX 258: Performed by: EMERGENCY MEDICINE

## 2020-12-25 PROCEDURE — 96367 TX/PROPH/DG ADDL SEQ IV INF: CPT

## 2020-12-25 RX ORDER — METOCLOPRAMIDE HYDROCHLORIDE 5 MG/ML
10 INJECTION INTRAMUSCULAR; INTRAVENOUS EVERY 6 HOURS
Status: DISCONTINUED | OUTPATIENT
Start: 2020-12-25 | End: 2020-12-27 | Stop reason: HOSPADM

## 2020-12-25 RX ORDER — HALOPERIDOL 5 MG/ML
5 INJECTION INTRAMUSCULAR ONCE
Status: COMPLETED | OUTPATIENT
Start: 2020-12-25 | End: 2020-12-25

## 2020-12-25 RX ORDER — LANOLIN ALCOHOL/MO/W.PET/CERES
3 CREAM (GRAM) TOPICAL NIGHTLY PRN
Status: DISCONTINUED | OUTPATIENT
Start: 2020-12-25 | End: 2020-12-27 | Stop reason: HOSPADM

## 2020-12-25 RX ORDER — ONDANSETRON 2 MG/ML
4 INJECTION INTRAMUSCULAR; INTRAVENOUS EVERY 6 HOURS PRN
Status: DISCONTINUED | OUTPATIENT
Start: 2020-12-25 | End: 2020-12-27 | Stop reason: HOSPADM

## 2020-12-25 RX ORDER — DICYCLOMINE HYDROCHLORIDE 10 MG/1
10 CAPSULE ORAL EVERY 6 HOURS PRN
Status: DISCONTINUED | OUTPATIENT
Start: 2020-12-25 | End: 2020-12-27 | Stop reason: HOSPADM

## 2020-12-25 RX ORDER — PROMETHAZINE HYDROCHLORIDE 25 MG/ML
25 INJECTION, SOLUTION INTRAMUSCULAR; INTRAVENOUS ONCE
Status: COMPLETED | OUTPATIENT
Start: 2020-12-25 | End: 2020-12-25

## 2020-12-25 RX ORDER — SODIUM CHLORIDE 0.9 % (FLUSH) 0.9 %
10 SYRINGE (ML) INJECTION PRN
Status: DISCONTINUED | OUTPATIENT
Start: 2020-12-25 | End: 2020-12-27 | Stop reason: HOSPADM

## 2020-12-25 RX ORDER — POTASSIUM CHLORIDE 7.45 MG/ML
10 INJECTION INTRAVENOUS PRN
Status: DISCONTINUED | OUTPATIENT
Start: 2020-12-25 | End: 2020-12-27 | Stop reason: HOSPADM

## 2020-12-25 RX ORDER — POTASSIUM CHLORIDE 20 MEQ/1
40 TABLET, EXTENDED RELEASE ORAL PRN
Status: DISCONTINUED | OUTPATIENT
Start: 2020-12-25 | End: 2020-12-27 | Stop reason: HOSPADM

## 2020-12-25 RX ORDER — SODIUM CHLORIDE 0.9 % (FLUSH) 0.9 %
10 SYRINGE (ML) INJECTION EVERY 12 HOURS SCHEDULED
Status: DISCONTINUED | OUTPATIENT
Start: 2020-12-25 | End: 2020-12-27 | Stop reason: HOSPADM

## 2020-12-25 RX ORDER — POLYETHYLENE GLYCOL 3350 17 G/17G
17 POWDER, FOR SOLUTION ORAL DAILY PRN
Status: DISCONTINUED | OUTPATIENT
Start: 2020-12-25 | End: 2020-12-27 | Stop reason: HOSPADM

## 2020-12-25 RX ORDER — DIPHENHYDRAMINE HYDROCHLORIDE 50 MG/ML
12.5 INJECTION INTRAMUSCULAR; INTRAVENOUS ONCE
Status: COMPLETED | OUTPATIENT
Start: 2020-12-25 | End: 2020-12-25

## 2020-12-25 RX ORDER — ACETAMINOPHEN 325 MG/1
650 TABLET ORAL EVERY 6 HOURS PRN
Status: DISCONTINUED | OUTPATIENT
Start: 2020-12-25 | End: 2020-12-27 | Stop reason: HOSPADM

## 2020-12-25 RX ORDER — SODIUM CHLORIDE 9 MG/ML
INJECTION, SOLUTION INTRAVENOUS CONTINUOUS
Status: DISCONTINUED | OUTPATIENT
Start: 2020-12-25 | End: 2020-12-27 | Stop reason: HOSPADM

## 2020-12-25 RX ORDER — 0.9 % SODIUM CHLORIDE 0.9 %
1000 INTRAVENOUS SOLUTION INTRAVENOUS ONCE
Status: COMPLETED | OUTPATIENT
Start: 2020-12-25 | End: 2020-12-25

## 2020-12-25 RX ORDER — FAMOTIDINE 20 MG/1
20 TABLET, FILM COATED ORAL 2 TIMES DAILY
Status: DISCONTINUED | OUTPATIENT
Start: 2020-12-25 | End: 2020-12-26

## 2020-12-25 RX ORDER — ACETAMINOPHEN 650 MG/1
650 SUPPOSITORY RECTAL EVERY 6 HOURS PRN
Status: DISCONTINUED | OUTPATIENT
Start: 2020-12-25 | End: 2020-12-27 | Stop reason: HOSPADM

## 2020-12-25 RX ORDER — HALOPERIDOL 5 MG/ML
10 INJECTION INTRAMUSCULAR ONCE
Status: COMPLETED | OUTPATIENT
Start: 2020-12-25 | End: 2020-12-25

## 2020-12-25 RX ORDER — ONDANSETRON 2 MG/ML
4 INJECTION INTRAMUSCULAR; INTRAVENOUS ONCE
Status: COMPLETED | OUTPATIENT
Start: 2020-12-25 | End: 2020-12-25

## 2020-12-25 RX ORDER — NICOTINE 21 MG/24HR
1 PATCH, TRANSDERMAL 24 HOURS TRANSDERMAL DAILY PRN
Status: DISCONTINUED | OUTPATIENT
Start: 2020-12-25 | End: 2020-12-27 | Stop reason: HOSPADM

## 2020-12-25 RX ADMIN — HALOPERIDOL LACTATE 10 MG: 5 INJECTION, SOLUTION INTRAMUSCULAR at 21:10

## 2020-12-25 RX ADMIN — SODIUM CHLORIDE 1000 ML: 9 INJECTION, SOLUTION INTRAVENOUS at 13:38

## 2020-12-25 RX ADMIN — ONDANSETRON 4 MG: 2 INJECTION INTRAMUSCULAR; INTRAVENOUS at 13:39

## 2020-12-25 RX ADMIN — Medication 10 ML: at 21:14

## 2020-12-25 RX ADMIN — ENOXAPARIN SODIUM 40 MG: 40 INJECTION SUBCUTANEOUS at 21:13

## 2020-12-25 RX ADMIN — FAMOTIDINE 20 MG: 20 TABLET, FILM COATED ORAL at 21:13

## 2020-12-25 RX ADMIN — PROMETHAZINE HYDROCHLORIDE 25 MG: 25 INJECTION INTRAMUSCULAR; INTRAVENOUS at 13:40

## 2020-12-25 RX ADMIN — HALOPERIDOL LACTATE 5 MG: 5 INJECTION, SOLUTION INTRAMUSCULAR at 14:51

## 2020-12-25 RX ADMIN — ONDANSETRON 4 MG: 2 INJECTION INTRAMUSCULAR; INTRAVENOUS at 21:13

## 2020-12-25 RX ADMIN — Medication 3 MG: at 21:13

## 2020-12-25 RX ADMIN — POTASSIUM CHLORIDE 10 MEQ: 7.46 INJECTION, SOLUTION INTRAVENOUS at 22:39

## 2020-12-25 RX ADMIN — SODIUM CHLORIDE: 9 INJECTION, SOLUTION INTRAVENOUS at 21:12

## 2020-12-25 RX ADMIN — DIPHENHYDRAMINE HYDROCHLORIDE 12.5 MG: 50 INJECTION, SOLUTION INTRAMUSCULAR; INTRAVENOUS at 13:45

## 2020-12-25 RX ADMIN — METOCLOPRAMIDE HYDROCHLORIDE 10 MG: 5 INJECTION INTRAMUSCULAR; INTRAVENOUS at 21:14

## 2020-12-25 ASSESSMENT — PAIN DESCRIPTION - DESCRIPTORS: DESCRIPTORS: ACHING;SORE;SHARP

## 2020-12-25 ASSESSMENT — PAIN SCALES - GENERAL: PAINLEVEL_OUTOF10: 10

## 2020-12-25 ASSESSMENT — PAIN DESCRIPTION - ONSET: ONSET: PROGRESSIVE

## 2020-12-25 ASSESSMENT — PAIN DESCRIPTION - LOCATION: LOCATION: ABDOMEN

## 2020-12-25 ASSESSMENT — PAIN DESCRIPTION - ORIENTATION: ORIENTATION: MID

## 2020-12-25 ASSESSMENT — PAIN DESCRIPTION - PROGRESSION: CLINICAL_PROGRESSION: GRADUALLY WORSENING

## 2020-12-25 ASSESSMENT — PAIN DESCRIPTION - PAIN TYPE: TYPE: ACUTE PAIN

## 2020-12-25 NOTE — ED NOTES
Pt to be admitted for further testing and evaluation. Pt updated on POC. All questions answered.      Carlo Moreno RN  12/25/20 9094

## 2020-12-25 NOTE — ED PROVIDER NOTES
eMERGENCY dEPARTMENT eNCOUnter      Pt Name: Dary Hauser  MRN: [id number]  [place] 1980  Date of evaluation: 12/25/2020  Provider: Mallorie Rojas MD     [address] [place] [address]       Chief Complaint   Patient presents with    Nausea     pt states she was here yesterday and not getting better    Abdominal Pain         HISTORY OF PRESENT ILLNESS   (Location/Symptom, Timing/Onset,Context/Setting, Quality, Duration, Modifying Factors, Severity) Note limiting factors. HPI    Dary Hauser is a 47 y.o. female who presents to the emergency department with intractable nausea and vomiting. There has been no diarrhea. Patient was seen yesterday for the same thing. Patient was treated and had negative work-up except for pancolitis. There is no fever. Normal white count. Patient however smokes daily marijuana. Patient unable to keep any fluids down. Vomiting acutely on arrival.  Patient has been retching as well. Planing of diffuse abdominal pain. Has been no fever. Patient was just last seen less than 24 hours ago. Nursing Notes were reviewed. REVIEW OFSYSTEMS    (2+ for level 4; 10+ for level 5)   Review of Systems    General: No fevers, chills or night sweats, No weight loss    Head:  No Sore throat,  No Ear Pain    Chest:  Nontender. No Cough, No SOB,  Chest Pain    GI: Positive abdominal pain with severe vomiting    : No dysuria or hematuria    Musculoskeletal: No unrelenting pain or night pain    Neurologic: No bowel or bladder incontinence, No saddle anesthesia, No leg weakness    All other systems reviewed and are negative.         PAST MEDICAL HISTORY     Past Medical History:   Diagnosis Date    Anemia     Scabies     Tobacco dependence        SURGICAL HISTORY       Past Surgical History:   Procedure Laterality Date    TYMPANOSTOMY TUBE PLACEMENT         CURRENT MEDICATIONS       Previous Medications    ACETAMINOPHEN (APAP EXTRA STRENGTH) 500 MG TABLET    Take 1 tablet by mouth every 6 hours as needed for Pain    CIPROFLOXACIN (CIPRO) 500 MG TABLET    Take 1 tablet by mouth 2 times daily for 7 days    DICYCLOMINE (BENTYL) 10 MG CAPSULE    Take 1 capsule by mouth every 6 hours as needed (cramps)    FAMOTIDINE (PEPCID) 20 MG TABLET    Take 1 tablet by mouth 2 times daily    HYDROCODONE-ACETAMINOPHEN (NORCO) 5-325 MG PER TABLET    Take 1 tablet by mouth every 6 hours as needed for Pain for up to 3 days. Intended supply: 3 days. Take lowest dose possible to manage pain    MAGNESIUM OXIDE (MAGOX 400) 400 (241.3 MG) MG TABS TABLET    Take 1 tablet by mouth 2 times daily    METRONIDAZOLE (FLAGYL) 500 MG TABLET    Take 1 tablet by mouth 2 times daily for 7 days    NAPROXEN (NAPROSYN) 500 MG TABLET    Take 1 tablet by mouth 2 times daily (with meals)    ONDANSETRON (ZOFRAN ODT) 4 MG DISINTEGRATING TABLET    Take 1 tablet by mouth every 8 hours as needed for Nausea    POTASSIUM & SODIUM PHOSPHATES (PHOS-NAK) 280-160-250 MG PACK    Take 1 packet by mouth 4 times daily       ALLERGIES     Patient has no known allergies.     FAMILY HISTORY       Family History   Problem Relation Age of Onset    High Blood Pressure Mother     Diabetes Maternal Grandmother     Stroke Maternal Grandmother         SOCIAL HISTORY       Social History     Socioeconomic History    Marital status: Single     Spouse name: Not on file    Number of children: Not on file    Years of education: Not on file    Highest education level: Not on file   Occupational History    Not on file   Social Needs    Financial resource strain: Not on file    Food insecurity     Worry: Not on file     Inability: Not on file    Transportation needs     Medical: Not on file     Non-medical: Not on file   Tobacco Use    Smoking status: Current Every Day Smoker     Packs/day: 0.50    Smokeless tobacco: Current User   Substance and Sexual Activity    Alcohol use: Yes     Comment: 1-2 weekly    Drug use: Yes     Types: Marijuana     Comment: every other day     Sexual activity: Yes   Lifestyle    Physical activity     Days per week: Not on file     Minutes per session: Not on file    Stress: Not on file   Relationships    Social connections     Talks on phone: Not on file     Gets together: Not on file     Attends Tenriism service: Not on file     Active member of club or organization: Not on file     Attends meetings of clubs or organizations: Not on file     Relationship status: Not on file    Intimate partner violence     Fear of current or ex partner: Not on file     Emotionally abused: Not on file     Physically abused: Not on file     Forced sexual activity: Not on file   Other Topics Concern    Not on file   Social History Narrative    Not on file       SCREENINGS           PHYSICAL EXAM    (up to 7 for level 4, 8 or more for level 5)     ED Triage Vitals   BP Temp Temp src Pulse Resp SpO2 Height Weight   -- -- -- -- -- -- -- --       Physical Exam    General: Alert and awake ×3. Nontoxic appearance. Well-developed well-nourished thin 63-year-old black female in moderate distress  HEENT: Normocephalic atraumatic. Neck is supple. Airway intact. No adenopathy  Cardiac: Regular rate and rhythm with no murmurs rubs or gallops  Pulmonary: Lungs are clear in all lung fields. No wheezing. No Rales. Abdomen: Soft and nontender. Negative hepatosplenomegaly. Bowel sounds are active. Actively retching. Extremities: Moving all extremities. No calf tenderness. Peripheral pulses all intact  Skin: No skin lesions. No rashes  Neurologic: Cranial nerves II through XII was grossly intact. Nonfocal neurological exam  Psychiatric: Patient is pleasant. Mood is appropriate. DIAGNOSTIC RESULTS     EKG (Per Emergency Physician):       RADIOLOGY (Per Emergency Physician):        Interpretation per the Radiologist below, if available at the time of this note:  Ct Abdomen Pelvis W Iv Contrast Additional Contrast? None    Result Date: 12/24/2020  EXAMINATION: CT OF THE ABDOMEN AND PELVIS WITH CONTRAST 12/24/2020 8:30 pm TECHNIQUE: CT of the abdomen and pelvis was performed with the administration of intravenous contrast. Multiplanar reformatted images are provided for review. Dose modulation, iterative reconstruction, and/or weight based adjustment of the mA/kV was utilized to reduce the radiation dose to as low as reasonably achievable. COMPARISON: 11/25/2020 CT HISTORY: ORDERING SYSTEM PROVIDED HISTORY: epigastric abd pain, nausea, blood in stool TECHNOLOGIST PROVIDED HISTORY: Reason for exam:->epigastric abd pain, nausea, blood in stool Additional Contrast?->None Reason for Exam: started with nausea yesterday but became much worse today around 1500. Reports ongoing issues with abd and nausea, has pending appointment with GI, states she has colitis. States some blood in her stool today that happens on occasion. Acuity: Acute Type of Exam: Initial FINDINGS: Lower Chest:  The lung bases are clear. The base of the heart is normal. Organs: The liver, gallbladder, biliary ducts, pancreas and spleen are normal. Kidneys and adrenal glands are normal. GI/Bowel: The stomach and duodenum are normal.  Proximal small bowel is normal.  Chronic mild mucosal edema at the terminal ileum. Extensive submucosal fat and thickening of the colon extending from the cecum through the rectum. The pattern and distribution is stable from prior imaging representing pancolitis. Pelvis: The bladder is unremarkable. The uterus is retroverted. Adnexa normal. Peritoneum/Retroperitoneum: The aorta tapers normally. No lymphadenopathy. Mild mesenteric edema is stable. Bones/Soft Tissues: No significant skeletal abnormalities. 1. Stable pattern of pancolitis. Correlate with clinical and laboratory workup. Infectious etiologies would be favored. Inflammatory bowel disease is considered less likely due to distribution. 2. No other significant finding.        ED BEDSIDE ULTRASOUND:   Performed by ED Physician - none    LABS:  Labs Reviewed   COMPREHENSIVE METABOLIC PANEL W/ REFLEX TO MG FOR LOW K - Abnormal; Notable for the following components:       Result Value    Potassium reflex Magnesium 3.0 (*)     Glucose 141 (*)     ALT 7 (*)     All other components within normal limits    Narrative:     Performed at:  [facility]) [facility]  [address] [place], [place]   Phone [phone]   CBC WITH AUTO DIFFERENTIAL    Narrative:     Performed at:  [facility]  [address] [place], [place]   Phone [phone]   LIPASE    Narrative:     Performed at:  [address] [facility]  [address] [place], [place]   Phone [phone]   MAGNESIUM    Narrative:     Performed at:  [address] [facility]  [address] [place], [place]   Phone [phone]        All other labs were within normal range or not returned as of this dictation. Procedures      EMERGENCY DEPARTMENT COURSE and DIFFERENTIAL DIAGNOSIS/MDM:   Vitals:    Vitals:    12/25/20 1322 12/25/20 1500 12/25/20 1604   BP: (!) 145/73 (!) 114/98 (!) 126/102   Pulse: 54 58 83   Resp: 16  16   Temp: 98 °F (36.7 °C)     TempSrc: Oral     SpO2: 96%  96%   Weight: 117 lb (53.1 kg)     Height: 5' 5\" (1.651 m)         Medications   0.9 % sodium chloride bolus (0 mLs Intravenous Stopped 12/25/20 1536)   diphenhydrAMINE (BENADRYL) injection 12.5 mg (12.5 mg Intravenous Given 12/25/20 1345)   promethazine (PHENERGAN) injection 25 mg (25 mg Intravenous Given 12/25/20 1340)   ondansetron (ZOFRAN) injection 4 mg (4 mg Intravenous Given 12/25/20 1339)   haloperidol lactate (HALDOL) injection 5 mg (5 mg Intravenous Given 12/25/20 1451)       MDM. Patient is a 45-year-old with intractable vomiting. Patient treated with Zofran Phenergan and finally Haldol. Continues to feel nauseated. Patient did have some relief and has calmed down and when she tries to walk moved she would vomit again. I have convinced the patient that most of this is secondary to her daily marijuana use. Patient was educated. Patient agrees to admit. Since this is her to ED visit less than 24 hours. Patient agrees and I spoke with the hospitalist who is willing to admit to a [facility].  Patient is stable condition on discharge to admission. REVAL:         CRITICAL CARE TIME   Total CriticalCare time was 0 minutes, excluding separately reportable procedures. There was a high probability of clinically significant/life threatening deterioration in the patient's condition which required my urgent intervention. CONSULTS:  None    PROCEDURES:  Unless otherwise noted below, none     [unfilled]    FINAL IMPRESSION      1. Cyclic vomiting syndrome          DISPOSITION/PLAN   DISPOSITION Admitted 12/25/2020 04:10:42 PM      PATIENT REFERRED TO:  No follow-up provider specified. DISCHARGE MEDICATIONS:  New Prescriptions    No medications on file          (Please note:  Portions of this note were completed with a voice recognition program.Efforts were made to edit the dictations but occasionally words and phrases are mis-transcribed.)  Form v2016. J.[id number]    DAVID AGUILAR MD (electronically signed)  Emergency Medicine Provider        Viktoria Fox MD  12/25/20 9120

## 2020-12-25 NOTE — ED NOTES
Pt up to bedside commode  Had small amt bloody stool  Back to bed  Slight improvement to nausea      Marjorie Siu RN  12/25/20 4470

## 2020-12-25 NOTE — ED NOTES
Pt was seen in room sticking fingers in mouth to vomit in bedside commode  Pt told to stop  EMD aware     Moise Decker RN  12/25/20 7002

## 2020-12-25 NOTE — ED TRIAGE NOTES
Pt states she started with nausea yesterday but became much worse today around 1500. Reports ongoing issues with abd and nausea, has pending appointment with GI, states she has colitis. States some blood in her stool today that happens on occasion.

## 2020-12-25 NOTE — ED NOTES
Pt calling out for her nurse and call light within reach.  Pt encouraged to use call light and updated on plan of care     Lizette Rose RN  12/24/20 2023

## 2020-12-25 NOTE — ED PROVIDER NOTES
activity: Yes   Lifestyle    Physical activity     Days per week: Not on file     Minutes per session: Not on file    Stress: Not on file   Relationships    Social connections     Talks on phone: Not on file     Gets together: Not on file     Attends Episcopal service: Not on file     Active member of club or organization: Not on file     Attends meetings of clubs or organizations: Not on file     Relationship status: Not on file    Intimate partner violence     Fear of current or ex partner: Not on file     Emotionally abused: Not on file     Physically abused: Not on file     Forced sexual activity: Not on file   Other Topics Concern    Not on file   Social History Narrative    Not on file     No current facility-administered medications for this encounter. Current Outpatient Medications   Medication Sig Dispense Refill    ondansetron (ZOFRAN ODT) 4 MG disintegrating tablet Take 1 tablet by mouth every 8 hours as needed for Nausea 20 tablet 0    HYDROcodone-acetaminophen (NORCO) 5-325 MG per tablet Take 1 tablet by mouth every 6 hours as needed for Pain for up to 3 days. Intended supply: 3 days.  Take lowest dose possible to manage pain 12 tablet 0    ciprofloxacin (CIPRO) 500 MG tablet Take 1 tablet by mouth 2 times daily for 7 days 14 tablet 0    metroNIDAZOLE (FLAGYL) 500 MG tablet Take 1 tablet by mouth 2 times daily for 7 days 14 tablet 0    famotidine (PEPCID) 20 MG tablet Take 1 tablet by mouth 2 times daily 60 tablet 0    dicyclomine (BENTYL) 10 MG capsule Take 1 capsule by mouth every 6 hours as needed (cramps) 20 capsule 0    naproxen (NAPROSYN) 500 MG tablet Take 1 tablet by mouth 2 times daily (with meals) 30 tablet 0    acetaminophen (APAP EXTRA STRENGTH) 500 MG tablet Take 1 tablet by mouth every 6 hours as needed for Pain 30 tablet 0    magnesium oxide (MAGOX 400) 400 (241.3 Mg) MG TABS tablet Take 1 tablet by mouth 2 times daily 14 tablet 0    potassium & sodium phosphates (PHOS-NAK) 280-160-250 MG PACK Take 1 packet by mouth 4 times daily 28 packet 0     No Known Allergies      REVIEW OF SYSTEMS  10 systems reviewed, pertinent positives per HPI otherwise noted to be negative    PHYSICAL EXAM  BP (!) 155/80   Pulse 50   Temp 98.1 °F (36.7 °C) (Oral)   Resp 16   SpO2 100%      CONSTITUTIONAL: AOx4, cooperative with exam, afebrile   HEAD: normocephalic, atraumatic   EYES: PERRL, EOMI, anicteric sclera   ENT: Moist mucous membranes, uvula midline   LUNGS: Bilateral breath sounds, CTAB, no rales/ronchi/wheezes   CARDIOVASCULAR: RRR, normal S1/S2, no m/r/g, 2+ pulses throughout   ABDOMEN: Soft, left lower quadrant and suprapubic tenderness, no right lower quadrant tenderness, no rebound tenderness or guarding, no peritoneal signs, no palpable masses, non-distended, +BS   NEUROLOGIC:  MAEx4, GCS 15   MUSCULOSKELETAL: No clubbing, cyanosis or edema   SKIN: No rash, pallor or wounds on exposed surfaces         RADIOLOGY  X-RAYS:  I have reviewed radiologic plain film image(s). ALL OTHER NON-PLAIN FILM IMAGES SUCH AS CT, ULTRASOUND AND MRI HAVE BEEN READ BY THE RADIOLOGIST. CT ABDOMEN PELVIS W IV CONTRAST Additional Contrast? None   Final Result   1. Stable pattern of pancolitis. Correlate with clinical and laboratory   workup. Infectious etiologies would be favored. Inflammatory bowel disease   is considered less likely due to distribution. 2. No other significant finding. EKG INTERPRETATION  None    PROCEDURES    ED COURSE/MDM    Patient seen and evaluated. History and physical as above. Nontoxic, afebrile. ED Course as of Dec 24 2328   Thu Dec 24, 2020   2054 Patient's blood work with normal white count of 4.2. Lipase of 14. Hemoglobin stable at 12.1. Mild hypokalemia 3.4. Normal kidney function. Magnesium 1.5. Patient had magnesium replaced with 1 g IV magnesium. Urinalysis negative for infection, urine pregnancy negative. CT abdomen pelvis pending. [DS]   2124 Patient CT abdomen pelvis shows stable pancolitis. Patient updated on results. Discussed discharge with Zofran, Norco for pain control, Cipro and Flagyl with outpatient follow-up to her GI specialist as scheduled in early January. Patient agreeable. Patient provided another dose of Zofran and morphine prior to discharge. Patient's  here to provide her a ride home. Return instructions provided. All questions answered prior to discharge. [DS]      ED Course User Index  [DS] Wendy Venegas MD       I estimate there is LOW risk for ACUTE APPENDICITIS, BOWEL OBSTRUCTION, CHOLECYSTITIS, DIVERTICULITIS, INCARCERATED HERNIA, PANCREATITIS, PELVIC INFLAMMATORY DISEASE, PERFORATED BOWEL or ULCER, PREGNANCY, or TUBO-OVARIAN ABSCESS, thus I consider the discharge disposition reasonable. Also, there is no evidence or peritonitis, sepsis, or toxicity. [address] [place] [address] and I have discussed the diagnosis and risks, and we agree with discharging home to follow-up with their primary doctor. We also discussed returning to the Emergency Department immediately if new or worsening symptoms occur. We have discussed the symptoms which are most concerning (e.g., bloody stool, fever, changing or worsening pain, vomiting) that necessitate immediate return. Patient was given scripts for the following medications. I counseled patient how to take these medications. Discharge Medication List as of 12/24/2020  9:47 PM      START taking these medications    Details   HYDROcodone-acetaminophen (NORCO) 5-325 MG per tablet Take 1 tablet by mouth every 6 hours as needed for Pain for up to 3 days. Intended supply: 3 days.  Take lowest dose possible to manage pain, Disp-12 tablet, R-0Print      ciprofloxacin (CIPRO) 500 MG tablet Take 1 tablet by mouth 2 times daily for 7 days, Disp-14 tablet, R-0Print      metroNIDAZOLE (FLAGYL) 500 MG tablet Take 1 tablet by mouth 2 times daily for 7 days, Disp-14 tablet, R-0Print CLINICAL IMPRESSION  1. Pancolitis (HCC)        Blood pressure (!) 155/80, pulse 50, temperature 98.1 °F (36.7 °C), temperature source Oral, resp. rate 16, SpO2 100 %. DISPOSITION  Patient was discharged to home in good condition. Alyson Casey MD  [address] [address]  [phone]    Go to   Your scheduled follow-up appointment in early January. Disclaimer: All medical record entries made by [address] [place] [address] [address] [facility].       (Please note that this note was completed with a voice recognition program. Every attempt was made to edit the dictations, but inevitably there remain words that are mis-transcribed.)            Ana Cristina Gaxiola MD  12/24/20 1812

## 2020-12-25 NOTE — ED NOTES
Pt shanti hx  Keeps moaning  Requesting pain medication and medicine for vomiting     Terrance Spurling, ZINA  12/25/20 6916

## 2020-12-26 LAB
ANION GAP SERPL CALCULATED.3IONS-SCNC: 15 MMOL/L (ref 3–16)
BUN BLDV-MCNC: 5 MG/DL (ref 7–20)
CALCIUM SERPL-MCNC: 9.6 MG/DL (ref 8.3–10.6)
CHLORIDE BLD-SCNC: 99 MMOL/L (ref 99–110)
CO2: 18 MMOL/L (ref 21–32)
CREAT SERPL-MCNC: <0.5 MG/DL (ref 0.6–1.1)
GFR AFRICAN AMERICAN: >60
GFR NON-AFRICAN AMERICAN: >60
GLUCOSE BLD-MCNC: 97 MG/DL (ref 70–99)
POTASSIUM REFLEX MAGNESIUM: 3.8 MMOL/L (ref 3.5–5.1)
SODIUM BLD-SCNC: 132 MMOL/L (ref 136–145)

## 2020-12-26 PROCEDURE — 2580000003 HC RX 258: Performed by: NURSE PRACTITIONER

## 2020-12-26 PROCEDURE — 96376 TX/PRO/DX INJ SAME DRUG ADON: CPT

## 2020-12-26 PROCEDURE — 96375 TX/PRO/DX INJ NEW DRUG ADDON: CPT

## 2020-12-26 PROCEDURE — 96372 THER/PROPH/DIAG INJ SC/IM: CPT

## 2020-12-26 PROCEDURE — 6360000002 HC RX W HCPCS: Performed by: INTERNAL MEDICINE

## 2020-12-26 PROCEDURE — 6370000000 HC RX 637 (ALT 250 FOR IP): Performed by: NURSE PRACTITIONER

## 2020-12-26 PROCEDURE — 94760 N-INVAS EAR/PLS OXIMETRY 1: CPT

## 2020-12-26 PROCEDURE — 6360000002 HC RX W HCPCS: Performed by: NURSE PRACTITIONER

## 2020-12-26 PROCEDURE — 96366 THER/PROPH/DIAG IV INF ADDON: CPT

## 2020-12-26 PROCEDURE — 2580000003 HC RX 258: Performed by: INTERNAL MEDICINE

## 2020-12-26 PROCEDURE — 96368 THER/DIAG CONCURRENT INF: CPT

## 2020-12-26 PROCEDURE — C9113 INJ PANTOPRAZOLE SODIUM, VIA: HCPCS | Performed by: INTERNAL MEDICINE

## 2020-12-26 PROCEDURE — 36415 COLL VENOUS BLD VENIPUNCTURE: CPT

## 2020-12-26 PROCEDURE — G0378 HOSPITAL OBSERVATION PER HR: HCPCS

## 2020-12-26 PROCEDURE — 80048 BASIC METABOLIC PNL TOTAL CA: CPT

## 2020-12-26 PROCEDURE — 1200000000 HC SEMI PRIVATE

## 2020-12-26 PROCEDURE — 6370000000 HC RX 637 (ALT 250 FOR IP): Performed by: HOSPITALIST

## 2020-12-26 RX ORDER — OXYCODONE HYDROCHLORIDE AND ACETAMINOPHEN 5; 325 MG/1; MG/1
1 TABLET ORAL EVERY 4 HOURS PRN
Status: DISCONTINUED | OUTPATIENT
Start: 2020-12-26 | End: 2020-12-27 | Stop reason: HOSPADM

## 2020-12-26 RX ORDER — PANTOPRAZOLE SODIUM 40 MG/10ML
40 INJECTION, POWDER, LYOPHILIZED, FOR SOLUTION INTRAVENOUS 2 TIMES DAILY
Status: DISCONTINUED | OUTPATIENT
Start: 2020-12-26 | End: 2020-12-27 | Stop reason: HOSPADM

## 2020-12-26 RX ORDER — SODIUM CHLORIDE 9 MG/ML
10 INJECTION INTRAVENOUS 2 TIMES DAILY
Status: DISCONTINUED | OUTPATIENT
Start: 2020-12-26 | End: 2020-12-27 | Stop reason: HOSPADM

## 2020-12-26 RX ADMIN — Medication 10 ML: at 11:50

## 2020-12-26 RX ADMIN — DICYCLOMINE HYDROCHLORIDE 10 MG: 10 CAPSULE ORAL at 02:09

## 2020-12-26 RX ADMIN — ONDANSETRON 4 MG: 2 INJECTION INTRAMUSCULAR; INTRAVENOUS at 11:48

## 2020-12-26 RX ADMIN — METOCLOPRAMIDE HYDROCHLORIDE 10 MG: 5 INJECTION INTRAMUSCULAR; INTRAVENOUS at 16:39

## 2020-12-26 RX ADMIN — POTASSIUM CHLORIDE 10 MEQ: 7.46 INJECTION, SOLUTION INTRAVENOUS at 01:04

## 2020-12-26 RX ADMIN — Medication 10 ML: at 12:04

## 2020-12-26 RX ADMIN — PANTOPRAZOLE SODIUM 40 MG: 40 INJECTION, POWDER, FOR SOLUTION INTRAVENOUS at 21:07

## 2020-12-26 RX ADMIN — ACETAMINOPHEN 650 MG: 325 TABLET ORAL at 09:57

## 2020-12-26 RX ADMIN — OXYCODONE HYDROCHLORIDE AND ACETAMINOPHEN 1 TABLET: 5; 325 TABLET ORAL at 10:55

## 2020-12-26 RX ADMIN — SODIUM CHLORIDE: 9 INJECTION, SOLUTION INTRAVENOUS at 20:54

## 2020-12-26 RX ADMIN — SODIUM CHLORIDE: 9 INJECTION, SOLUTION INTRAVENOUS at 12:08

## 2020-12-26 RX ADMIN — METOCLOPRAMIDE HYDROCHLORIDE 10 MG: 5 INJECTION INTRAMUSCULAR; INTRAVENOUS at 08:27

## 2020-12-26 RX ADMIN — DICYCLOMINE HYDROCHLORIDE 10 MG: 10 CAPSULE ORAL at 08:27

## 2020-12-26 RX ADMIN — DICYCLOMINE HYDROCHLORIDE 10 MG: 10 CAPSULE ORAL at 16:39

## 2020-12-26 RX ADMIN — POTASSIUM CHLORIDE 10 MEQ: 7.46 INJECTION, SOLUTION INTRAVENOUS at 03:13

## 2020-12-26 RX ADMIN — FAMOTIDINE 20 MG: 20 TABLET, FILM COATED ORAL at 08:27

## 2020-12-26 RX ADMIN — Medication 10 ML: at 21:12

## 2020-12-26 RX ADMIN — ACETAMINOPHEN 650 MG: 325 TABLET ORAL at 02:09

## 2020-12-26 RX ADMIN — ENOXAPARIN SODIUM 40 MG: 40 INJECTION SUBCUTANEOUS at 21:13

## 2020-12-26 RX ADMIN — PANTOPRAZOLE SODIUM 40 MG: 40 INJECTION, POWDER, FOR SOLUTION INTRAVENOUS at 12:03

## 2020-12-26 RX ADMIN — METOCLOPRAMIDE HYDROCHLORIDE 10 MG: 5 INJECTION INTRAMUSCULAR; INTRAVENOUS at 20:55

## 2020-12-26 RX ADMIN — POTASSIUM CHLORIDE 10 MEQ: 7.46 INJECTION, SOLUTION INTRAVENOUS at 05:39

## 2020-12-26 RX ADMIN — PROMETHAZINE HYDROCHLORIDE 12.5 MG: 25 INJECTION INTRAMUSCULAR; INTRAVENOUS at 08:45

## 2020-12-26 RX ADMIN — Medication 10 ML: at 21:09

## 2020-12-26 RX ADMIN — POTASSIUM CHLORIDE 10 MEQ: 7.46 INJECTION, SOLUTION INTRAVENOUS at 04:39

## 2020-12-26 RX ADMIN — ONDANSETRON 4 MG: 2 INJECTION INTRAMUSCULAR; INTRAVENOUS at 05:38

## 2020-12-26 RX ADMIN — OXYCODONE HYDROCHLORIDE AND ACETAMINOPHEN 1 TABLET: 5; 325 TABLET ORAL at 20:54

## 2020-12-26 RX ADMIN — METOCLOPRAMIDE HYDROCHLORIDE 10 MG: 5 INJECTION INTRAMUSCULAR; INTRAVENOUS at 02:01

## 2020-12-26 RX ADMIN — POTASSIUM CHLORIDE 10 MEQ: 7.46 INJECTION, SOLUTION INTRAVENOUS at 06:48

## 2020-12-26 RX ADMIN — POTASSIUM CHLORIDE 10 MEQ: 7.46 INJECTION, SOLUTION INTRAVENOUS at 02:01

## 2020-12-26 ASSESSMENT — PAIN DESCRIPTION - PAIN TYPE
TYPE: ACUTE PAIN

## 2020-12-26 ASSESSMENT — PAIN SCALES - GENERAL
PAINLEVEL_OUTOF10: 2
PAINLEVEL_OUTOF10: 0
PAINLEVEL_OUTOF10: 0
PAINLEVEL_OUTOF10: 6
PAINLEVEL_OUTOF10: 3
PAINLEVEL_OUTOF10: 10
PAINLEVEL_OUTOF10: 9
PAINLEVEL_OUTOF10: 9

## 2020-12-26 ASSESSMENT — PAIN DESCRIPTION - PROGRESSION
CLINICAL_PROGRESSION: GRADUALLY WORSENING
CLINICAL_PROGRESSION: GRADUALLY IMPROVING
CLINICAL_PROGRESSION: NOT CHANGED
CLINICAL_PROGRESSION: GRADUALLY WORSENING

## 2020-12-26 ASSESSMENT — PAIN DESCRIPTION - LOCATION
LOCATION: ABDOMEN

## 2020-12-26 ASSESSMENT — PAIN DESCRIPTION - ONSET
ONSET: ON-GOING

## 2020-12-26 ASSESSMENT — PAIN DESCRIPTION - ORIENTATION
ORIENTATION: MID;LOWER
ORIENTATION: MID;LOWER
ORIENTATION: LOWER
ORIENTATION: MID
ORIENTATION: MID
ORIENTATION: MID;LOWER
ORIENTATION: MID

## 2020-12-26 ASSESSMENT — PAIN DESCRIPTION - DESCRIPTORS
DESCRIPTORS: ACHING
DESCRIPTORS: ACHING
DESCRIPTORS: OTHER (COMMENT)
DESCRIPTORS: ACHING
DESCRIPTORS: ACHING;CRAMPING

## 2020-12-26 ASSESSMENT — PAIN DESCRIPTION - FREQUENCY
FREQUENCY: CONTINUOUS
FREQUENCY: CONTINUOUS
FREQUENCY: INTERMITTENT
FREQUENCY: INTERMITTENT
FREQUENCY: CONTINUOUS
FREQUENCY: CONTINUOUS

## 2020-12-26 ASSESSMENT — PAIN - FUNCTIONAL ASSESSMENT
PAIN_FUNCTIONAL_ASSESSMENT: ACTIVITIES ARE NOT PREVENTED
PAIN_FUNCTIONAL_ASSESSMENT: ACTIVITIES ARE NOT PREVENTED
PAIN_FUNCTIONAL_ASSESSMENT: PREVENTS OR INTERFERES SOME ACTIVE ACTIVITIES AND ADLS
PAIN_FUNCTIONAL_ASSESSMENT: ACTIVITIES ARE NOT PREVENTED
PAIN_FUNCTIONAL_ASSESSMENT: ACTIVITIES ARE NOT PREVENTED
PAIN_FUNCTIONAL_ASSESSMENT: PREVENTS OR INTERFERES SOME ACTIVE ACTIVITIES AND ADLS

## 2020-12-26 NOTE — CONSULTS
[address] [place] [address]  GI Consult Note    Patient: Abad Marquis  : 1980  Acct#:      Date:  2020    Subjective:       History of Present Illness  Patient is a 47 y.o. female admitted with Intractable nausea and vomiting [R11.2] who is seen in consult for same. She has a history of scabies, anemia, tympanostomy tube placement, marijuana use, smoking and presents with intractable nausea and vomiting. Original labs  showed BUN 6 and Cr <0.5. LFT's normal.  Lipase normal.  CBC normal. Urine pregnancy negative. CT A&p with contrast showed chronic mild mucosal edema at the terminal ileum and extensive submucosal fat and thickening of the colon from the cecum to rectum consistent with pancolitis. This was also present on CT 20 and 2020. Getting a lot of nausea and dry heaving. Gets some discomfort in the lower abdomen. Crampy/achy discomfort. Does get some diarrhea. Gets some red blood in the stool. No symptoms in between episodes. With flare ups gets abdominal pain in the lower abominal, diarrhea, blood in the stool, and nausea and vomiting. Nausea is the worst symptom. Has 1 BM per day. No fevers. +weight loss with 10 lbs. Intermittent symptoms. Severe when they come on. Last a couple days and get better. Has colonoscopy scheduled with Dr. Kevin Myers in January. Past Medical History:   Diagnosis Date    Anemia     Scabies     Tobacco dependence       Past Surgical History:   Procedure Laterality Date    TYMPANOSTOMY TUBE PLACEMENT            Admission Meds  No current facility-administered medications on file prior to encounter.       Current Outpatient Medications on File Prior to Encounter   Medication Sig Dispense Refill    ondansetron (ZOFRAN ODT) 4 MG disintegrating tablet Take 1 tablet by mouth every 8 hours as needed for Nausea 20 tablet 0    HYDROcodone-acetaminophen (NORCO) 5-325 MG per tablet Take 1 tablet by mouth every 6 hours as needed for Pain for up to 3 days. Intended supply: 3 days. Take lowest dose possible to manage pain 12 tablet 0    ciprofloxacin (CIPRO) 500 MG tablet Take 1 tablet by mouth 2 times daily for 7 days 14 tablet 0    metroNIDAZOLE (FLAGYL) 500 MG tablet Take 1 tablet by mouth 2 times daily for 7 days 14 tablet 0    famotidine (PEPCID) 20 MG tablet Take 1 tablet by mouth 2 times daily 60 tablet 0    dicyclomine (BENTYL) 10 MG capsule Take 1 capsule by mouth every 6 hours as needed (cramps) 20 capsule 0    acetaminophen (APAP EXTRA STRENGTH) 500 MG tablet Take 1 tablet by mouth every 6 hours as needed for Pain 30 tablet 0         @medscheduled@    Allergies  No Known Allergies   Social   Social History     Tobacco Use    Smoking status: Current Every Day Smoker     Packs/day: 0.50    Smokeless tobacco: Current User   Substance Use Topics    Alcohol use: Yes     Comment: 1-2 weekly        Family History   Problem Relation Age of Onset    High Blood Pressure Mother     Diabetes Maternal Grandmother     Stroke Maternal Grandmother           Review of systems: +weight loss, nausea, vomiting, diarrhea, abdominal pain   Denies fever, rash, jaundice, chest pain, palpitations, shortness of breath, cough, nausea, vomiting, constipation, melena, hematochezia, dysuria, hematuria, lower extremity edema. Physical Exam  Blood pressure (!) 151/96, pulse 82, temperature 98.2 °F (36.8 °C), temperature source Oral, resp. rate 16, height 5' 5\" (1.651 m), weight 112 lb 14 oz (51.2 kg), last menstrual period 12/18/2020, SpO2 100 %. General appearance: alert, cooperative, no distress,   Anicteric, No Jaundice  Head: Normocephalic, without obvious abnormality  Lungs: clear to auscultation bilaterally, Normal Effort  Heart: regular rate and rhythm, no murmurs   Abdomen: soft, mildly tender.  Bowel sounds normal, non-distended  Extremities: No edema  Skin: warm and dry  Neuro: No tremor  Psych: A&OX3      Data Review:    Recent Labs 12/24/20 2005 12/25/20  1320   WBC 4.2 6.0   HGB 12.1 13.2   HCT 37.5 39.8   MCV 96.4 95.3    232     Recent Labs     12/24/20 2005 12/25/20  1320 12/26/20  0520    138 132*   K 3.4* 3.0* 3.8    102 99   CO2 21 21 18*   BUN 6* 7 5*   CREATININE <0.5* 0.6 <0.5*     Recent Labs     12/24/20 2005 12/25/20  1320   AST 14* 15   ALT 7* 7*   BILITOT 0.3 0.5   ALKPHOS 81 91     Recent Labs     12/24/20 2005 12/25/20  1320   LIPASE 14.0 13.0     No results for input(s): PROTIME, INR in the last 72 hours. No results for input(s): PTT in the last 72 hours. No results for input(s): OCCULTBLD in the last 72 hours. Imaging Studies:  As per HPI                                           Assessment:     Principal Problem:    Intractable nausea and vomiting  Active Problems:    Marijuana use  Resolved Problems:    * No resolved hospital problems. *    1. Chronic nausea and vomiting: No etiology on CT (other than colitis which would normally give more diarrhea rather than nausea) and lab evaluation. Intermittent symptoms. Cyclical vomiting certainly possible. Low risk for gastroparesis. Doubt ulcer based on intermittent symptoms only. 2.  Chronic colitis. Really does not sound like IBD based on intermittent symptoms and no chronic symptoms. Does have weight loss which would go along with it. Regardless, definitely needs colonoscopy to further evaluate the CT findings. Recommendations:   Supportive care for now to get nausea under control. Hopeful for bowel prep tomorrow if we are able to get nausea under control with plans for EGD/colon Monday. COVID test pre-op. I put in orders for bowel prep, consent, clear diet, npo on Sunday night, and the covid test.     Thank you for allowing me to participate in the care of this patient. If you have any questions or concerns, please do not hesitate to contact me.     Mallory Dunn MD  [address] [facility]  Office: [phone]

## 2020-12-26 NOTE — PROGRESS NOTES
Prn cramping and pain medication given as ordered.  Electronically signed by Yariel Ernst RN on 12/26/2020 at 2:28 AM

## 2020-12-26 NOTE — H&P
for 7 days 12/24/20 12/31/20  Leatha Collins MD   metroNIDAZOLE (FLAGYL) 500 MG tablet Take 1 tablet by mouth 2 times daily for 7 days 12/24/20 12/31/20  Leatha Collins MD   famotidine (PEPCID) 20 MG tablet Take 1 tablet by mouth 2 times daily 11/25/20   Haroon Cerna,    dicyclomine (BENTYL) 10 MG capsule Take 1 capsule by mouth every 6 hours as needed (cramps) 8/20/20 8/25/20  Cheko Morales MD   naproxen (NAPROSYN) 500 MG tablet Take 1 tablet by mouth 2 times daily (with meals) 8/17/20   Edison Severin, PA   acetaminophen (APAP EXTRA STRENGTH) 500 MG tablet Take 1 tablet by mouth every 6 hours as needed for Pain 8/17/20   Edison Severin, PA   magnesium oxide (MAGOX 400) 400 (241.3 Mg) MG TABS tablet Take 1 tablet by mouth 2 times daily 1/13/18   YARELI Kwon   potassium & sodium phosphates (PHOS-NAK) 280-160-250 MG PACK Take 1 packet by mouth 4 times daily 1/13/18   YARELI Kwon       Allergies:  Patient has no known allergies. Social History:      The patient currently lives at home with family    TOBACCO:   reports that she has been smoking. She has been smoking about 0.50 packs per day. She uses smokeless tobacco.  ETOH:   reports current alcohol use. Family History:      Reviewed in detail positive as follows:        Problem Relation Age of Onset    High Blood Pressure Mother     Diabetes Maternal Grandmother     Stroke Maternal Grandmother        REVIEW OF SYSTEMS:   Pertinent positives as noted in the HPI. All other systems reviewed and negative. PHYSICAL EXAM PERFORMED:    BP (!) 151/99   Pulse 80   Temp 98.1 °F (36.7 °C) (Oral)   Resp 16   Ht 5' 5\" (1.651 m)   Wt 117 lb (53.1 kg)   LMP 12/18/2020   SpO2 100%   BMI 19.47 kg/m²     General appearance: Well developed, well-nourished -American female uncomfortable in appearance but in no apparent distress, lying on hospital bed. Appears stated age and cooperative.   HEENT:  Normal cephalic, atraumatic without obvious deformity. Pupils equal, round, and reactive to light. Extra ocular muscles intact. Conjunctivae/corneas clear. Neck: Supple, with full range of motion. No jugular venous distention. Trachea midline. Respiratory:  Normal respiratory effort. Clear to auscultation, bilaterally without Rales/Wheezes/Rhonchi. Cardiovascular:  Regular rate and rhythm without murmurs, rubs or gallops. Abdomen: Soft, diffusely tender, but without rebound or guarding. Normal bowel sounds. Musculoskeletal:  No clubbing, cyanosis or edema bilaterally. Full range of motion without deformity. Skin: Skin color, texture, turgor normal.  No rashes or lesions. Neurologic:  Neurovascularly intact without any focal sensory/motor deficits. Cranial nerves: II-XII intact, grossly non-focal.  Psychiatric:  Alert and oriented, thought content appropriate, anxious and irritable from nausea and pain  Capillary Refill: Brisk,< 3 seconds   Peripheral Pulses: +2 palpable, equal bilaterally       Labs:     Recent Labs     12/24/20 2005 12/25/20  1320   WBC 4.2 6.0   HGB 12.1 13.2   HCT 37.5 39.8    232     Recent Labs     12/24/20 2005 12/25/20  1320    138   K 3.4* 3.0*    102   CO2 21 21   BUN 6* 7   CREATININE <0.5* 0.6   CALCIUM 9.1 9.8     Recent Labs     12/24/20 2005 12/25/20  1320   AST 14* 15   ALT 7* 7*   BILITOT 0.3 0.5   ALKPHOS 81 91     No results for input(s): INR in the last 72 hours. No results for input(s): Shellia Bugler in the last 72 hours.     Urinalysis:      Lab Results   Component Value Date    NITRU Negative 12/24/2020    WBCUA 0-2 11/26/2020    BACTERIA Rare 11/26/2020    RBCUA None seen 11/26/2020    BLOODU Negative 12/24/2020    SPECGRAV 1.020 12/24/2020    GLUCOSEU Negative 12/24/2020       Radiology:       No orders to display       ASSESSMENT:    Active Hospital Problems    Diagnosis Date Noted    Intractable nausea and vomiting [R11.2] 12/25/2020    Marijuana use [F12.90] 12/25/2020 PLAN:    Intractable Nausea and Vomiting  - Hx daily Marijuana use,  on cessation/ risk of cyclical vomiting syndrome  - Zofran/phenergan prn  - reglan IV  - IV fluids  - bentyl prn abdominal cramping, avoid narcotics  - clear liquids and adv as rina  - replace electrolytes prn    DVT Prophylaxis: Lovenox  Diet: DIET CLEAR LIQUID;  Code Status: Full Code    Dispo - Observation       [address], APRN - CNP    Thank you No primary care provider on file. for the opportunity to be involved in this patient's care. If you have any questions or concerns please feel free to contact me at [phone].

## 2020-12-26 NOTE — PROGRESS NOTES
Patient denies any n/v at this time. Will continue to monitor and assess . Electronically signed by Stew Herrera RN on 12/25/2020 at 10:16 PM

## 2020-12-26 NOTE — PROGRESS NOTES
Patient in bed, eyes closed, no distress noted.  Personal items and call light in reach, will monitor

## 2020-12-26 NOTE — PROGRESS NOTES
Patient alert and oriented times four. Prn nausea medication given as ordered. Patient up as tolerated. Patient ambulated well to bathroom. Patient oriented to the room. Will continue to monitor and assess.  Electronically signed by Cherie Rashid RN on 12/25/2020 at 9:28 PM

## 2020-12-26 NOTE — PLAN OF CARE
Problem: Nausea/Vomiting:  Goal: Absence of nausea/vomiting  Description: Absence of nausea/vomiting  Outcome: Ongoing  Goal: Able to drink  Description: Able to drink  Outcome: Ongoing  Goal: Able to eat  Description: Able to eat  Outcome: Ongoing  Goal: Ability to achieve adequate nutritional intake will improve  Description: Ability to achieve adequate nutritional intake will improve  Outcome: Ongoing   Prn nausea medication given as ordered.  Electronically signed by Samantha Courtney RN on 12/25/2020 at 9:32 PM

## 2020-12-26 NOTE — PROGRESS NOTES
Hospitalist Progress Note  12/26/2020 9:51 AM    PCP: No primary care provider on file. [phone]     Date of Admission: 12/25/2020                                                                                                                     HOSPITAL COURSE    Patient demographics:  The patient  Jaynee Dakins is a 47 y.o. female     Significant past medical history:   Patient Active Problem List   Diagnosis    Rash    Pruritus    Anemia    Tobacco dependence    Intractable nausea and vomiting    Marijuana use         Presenting symptoms:  Nausea/vomiting    Diagnostic workup:      CONSULTS DURING ADMISSION :   None      Patient was diagnosed with:        Treatment while inpatient:                                                                                         ----------------------------------------------------------      SUBJECTIVE COMPLAINTS- follow up for Nausea/vomiting    Diet: DIET CLEAR LIQUID;      OBJECTIVE:   Patient Active Problem List   Diagnosis    Rash    Pruritus    Anemia    Tobacco dependence    Intractable nausea and vomiting    Marijuana use       Allergies  Patient has no known allergies.     Medications    Scheduled Meds:   famotidine  20 mg Oral BID    sodium chloride flush  10 mL Intravenous 2 times per day    enoxaparin  40 mg Subcutaneous Nightly    metoclopramide  10 mg Intravenous Q6H     Continuous Infusions:   sodium chloride 125 mL/hr at 12/25/20 2112     PRN Meds:  dicyclomine, sodium chloride flush, acetaminophen **OR** acetaminophen, polyethylene glycol, potassium chloride **OR** potassium alternative oral replacement **OR** potassium chloride, nicotine, melatonin, ondansetron, promethazine    Vitals   Vitals /wt   Patient Vitals for the past 8 hrs:   BP Temp Temp src Pulse Resp SpO2 Weight   12/26/20 0842 (!) 151/96 98.2 °F (36.8 °C) Oral 82 16 100 % --   12/26/20 0525 -- -- -- -- -- -- 112 lb 14 oz (51.2 kg)        72HR INTAKE/OUTPUT:  No intake or output data in the 24 hours ending 12/26/20 0951    Exam:    Gen:   Alert and oriented ×3   Eyes: PERRL. No sclera icterus. No conjunctival injection. ENT: No discharge. Pharynx clear. External appearance of ears and nose normal.  Neck: Trachea midline. No obvious mass. Resp: No accessory muscle use. No crackles. No wheezes. No rhonchi. CV: Regular rate. Regular rhythm. No murmur or rub. No edema. GI: Non-tender. Non-distended. No hernia. Skin: Warm, dry, normal texture and turgor. Lymph: No cervical LAD. No supraclavicular LAD. M/S: / Ext. No cyanosis. No clubbing. No joint deformity. Neuro: CN 2-12 are intact,  no neurologic deficits noted. PT/INR: No results for input(s): PROTIME, INR in the last 72 hours. APTT: No results for input(s): APTT in the last 72 hours. CBC:   Recent Labs     12/24/20 2005 12/25/20  1320   WBC 4.2 6.0   HGB 12.1 13.2   HCT 37.5 39.8   MCV 96.4 95.3    232       BMP:   Recent Labs     12/24/20 2005 12/25/20  1320 12/26/20  0520    138 132*   K 3.4* 3.0* 3.8    102 99   CO2 21 21 18*   BUN 6* 7 5*   CREATININE <0.5* 0.6 <0.5*       LIVER PROFILE:   Recent Labs     12/24/20 2005 12/25/20  1320   ALKPHOS 81 91   AST 14* 15   ALT 7* 7*   BILITOT 0.3 0.5     No results for input(s): AMYLASE in the last 72 hours. Recent Labs     12/24/20 2005 12/25/20  1320   LIPASE 14.0 13.0       UA:No results for input(s): NITRITE, LABCAST, WBCUA, RBCUA, MUCUS in the last 72 hours. TROPONIN: No results for input(s): Alejandrina Holden in the last 72 hours. Lab Results   Component Value Date/Time    URRFLXCULT Not Indicated 12/24/2020 07:10 PM       No results for input(s): TSHREFLEX in the last 72 hours. No components found for: [id number]  POC GLUCOSE:  No results for input(s): POCGLU in the last 72 hours. No results for input(s): LABA1C in the last 72 hours.  No results found for: LABA1C      ASSESSMENT AND PLAN    Colitis  CT scan of the abdomen is consistent with colitis  - Zofran/phenergan prn  - reglan IV  - IV fluids  - bentyl prn abdominal cramping, avoid narcotics  - clear liquids and adv as rina  - replace electrolytes prn  GI consult is appreciated  Plan is for EGD and colonoscopy    Cyclical vomiting syndrome  Symptomatic treatment      Code Status: Full Code        Dispo -cc        The patient and / or the family were informed of the results of any tests, a time was given to answer questions, a plan was proposed and they agreed with plan. Austin Quinones MD    This note was transcribed using [id number] [employer]. Please disregard any translational errors.

## 2020-12-26 NOTE — PROGRESS NOTES
Patient continues to be nauseated, she has not vomited this morning. Pain is better controled with added pain medication. She is not drinking much but IV fluids continue to infuse. Her vitals are stable. Call light in reach.

## 2020-12-26 NOTE — PROGRESS NOTES
Prn nausea medication given as ordered. Patient's 5th of 6 potassium prn runner's infusing as ordered per iv protocol. Patient alert and oriented times four. NSR on tele . Electronically signed by Taco Dorsey RN on 12/26/2020 at 5:59 AM

## 2020-12-26 NOTE — PROGRESS NOTES
Perfect serve to Dr Rouse Shoulders: She complains of pain in the lower abdomen of 9/10, Tylenol not working well. can she have something else for pain. Nausea not controlled. Given Zofran, phengran, reglan, and Pepcid.  She is refusing telly box

## 2020-12-26 NOTE — PLAN OF CARE
Problem: Nausea/Vomiting:  Goal: Absence of nausea/vomiting  Description: Absence of nausea/vomiting  Outcome: Met This Shift  Goal: Able to drink  Description: Able to drink  Outcome: Met This Shift  Goal: Able to eat  Description: Able to eat  Outcome: Met This Shift  Goal: Ability to achieve adequate nutritional intake will improve  Description: Ability to achieve adequate nutritional intake will improve  Outcome: Met This Shift     Problem: Pain:  Goal: Pain level will decrease  Description: Pain level will decrease  Outcome: Met This Shift  Goal: Control of acute pain  Description: Control of acute pain  Outcome: Met This Shift  Goal: Control of chronic pain  Description: Control of chronic pain  Outcome: Met This Shift

## 2020-12-26 NOTE — ED NOTES
[facility] arrives to transport patient to 3116, Pt is AAOx3, [facility].      Ed Berry RN  12/25/20 1935

## 2020-12-27 VITALS
WEIGHT: 115.96 LBS | HEIGHT: 65 IN | SYSTOLIC BLOOD PRESSURE: 130 MMHG | HEART RATE: 76 BPM | OXYGEN SATURATION: 99 % | BODY MASS INDEX: 19.32 KG/M2 | DIASTOLIC BLOOD PRESSURE: 77 MMHG | RESPIRATION RATE: 18 BRPM | TEMPERATURE: 98.5 F

## 2020-12-27 PROCEDURE — C9113 INJ PANTOPRAZOLE SODIUM, VIA: HCPCS | Performed by: INTERNAL MEDICINE

## 2020-12-27 PROCEDURE — 2580000003 HC RX 258: Performed by: INTERNAL MEDICINE

## 2020-12-27 PROCEDURE — 96366 THER/PROPH/DIAG IV INF ADDON: CPT

## 2020-12-27 PROCEDURE — G0378 HOSPITAL OBSERVATION PER HR: HCPCS

## 2020-12-27 PROCEDURE — 96376 TX/PRO/DX INJ SAME DRUG ADON: CPT

## 2020-12-27 PROCEDURE — 2580000003 HC RX 258: Performed by: NURSE PRACTITIONER

## 2020-12-27 PROCEDURE — 94760 N-INVAS EAR/PLS OXIMETRY 1: CPT

## 2020-12-27 PROCEDURE — 6360000002 HC RX W HCPCS: Performed by: NURSE PRACTITIONER

## 2020-12-27 PROCEDURE — 6360000002 HC RX W HCPCS: Performed by: INTERNAL MEDICINE

## 2020-12-27 RX ADMIN — Medication 10 ML: at 09:57

## 2020-12-27 RX ADMIN — SODIUM CHLORIDE: 9 INJECTION, SOLUTION INTRAVENOUS at 06:29

## 2020-12-27 RX ADMIN — METOCLOPRAMIDE HYDROCHLORIDE 10 MG: 5 INJECTION INTRAMUSCULAR; INTRAVENOUS at 09:57

## 2020-12-27 RX ADMIN — METOCLOPRAMIDE HYDROCHLORIDE 10 MG: 5 INJECTION INTRAMUSCULAR; INTRAVENOUS at 03:11

## 2020-12-27 RX ADMIN — PANTOPRAZOLE SODIUM 40 MG: 40 INJECTION, POWDER, FOR SOLUTION INTRAVENOUS at 09:57

## 2020-12-27 ASSESSMENT — PAIN SCALES - GENERAL: PAINLEVEL_OUTOF10: 0

## 2020-12-27 NOTE — PROGRESS NOTES
Perfect serve to Dr Lashon Ramirez and colonoscopy to be done outpatient. She is tolerating general diet. No nausea/vomiting or pain. Wants to be discharged. GI ok with discharge. Thanks

## 2020-12-27 NOTE — PLAN OF CARE
Problem: Nausea/Vomiting:  Goal: Absence of nausea/vomiting  Description: Absence of nausea/vomiting  12/26/2020 2344 by Sean Montes De Oca RN  Outcome: Ongoing     Problem: Nausea/Vomiting:  Goal: Able to drink  Description: Able to drink  12/26/2020 2344 by Sean Montes De Oca RN  Outcome: Ongoing     Problem: Nausea/Vomiting:  Goal: Able to eat  Description: Able to eat  12/26/2020 2344 by Sean Montes De Oca RN  Outcome: Ongoing   Patient complaining of nausea, medicated with Protonix, Regaln. Pt also has PRN Zofran and Phenergan. Will continue to monitor. Problem: Nausea/Vomiting:  Goal: Ability to achieve adequate nutritional intake will improve  Description: Ability to achieve adequate nutritional intake will improve  12/26/2020 2344 by Sean Montes De Oca RN  Outcome: Ongoing     Problem: Pain:  Goal: Pain level will decrease  Description: Pain level will decrease  12/26/2020 2344 by Sean Montes De Oca RN  Outcome: Ongoing     Problem: Pain:  Goal: Control of acute pain  Description: Control of acute pain  12/26/2020 2344 by Sean Montes De Oca RN  Outcome: Ongoing     Problem: Pain:  Goal: Control of chronic pain  Description: Control of chronic pain  12/26/2020 2344 by Sean Montes De Oca RN  Outcome: Ongoing   Pain/discomfort being managed with PRN analgesics per MD orders. Pt able to express presence and absence of pain and rate pain appropriately using numerical scale.

## 2020-12-27 NOTE — PROGRESS NOTES
Pt AAO x4. C/o lower abd pain 10/10 on pain scale. Medicated with PRN Percocet. Assessment completed and charted. C/o nausea. Scheduled Reglan and Protonix given. IVF infusing without any difficulty. New IV started in [facility]. Pt tolerated well. Denies any needs at this time. Call light in reach. Will monitor.

## 2020-12-27 NOTE — PROGRESS NOTES
Patient is tolerating a full liquid diet, she was only give percocet on time this shift. She is not nauseated at this time. Fluids continue to infuse. She reported on small BM this shift and denies any blood in her stool. I did educate her on smoking health hazards, she verbalized understanding. Her boyfriend has been told that visiting hours are over and he has to leave the building. He verbalized understanding. Call light in reach.

## 2020-12-27 NOTE — PROGRESS NOTES
Gastroenterology Progress Note    Haylee Rowan is a 47 y.o. female patient. Principal Problem:    Intractable nausea and vomiting  Active Problems:    Marijuana use  Resolved Problems:    * No resolved hospital problems. *      SUBJECTIVE:  Feels better. NO further nausea, vomiting, abdominal pain. No fevers.     Current Facility-Administered Medications: oxyCODONE-acetaminophen (PERCOCET) 5-325 MG per tablet 1 tablet, 1 tablet, Oral, Q4H PRN  pantoprazole (PROTONIX) injection 40 mg, 40 mg, Intravenous, BID **AND** sodium chloride (PF) 0.9 % injection 10 mL, 10 mL, Intravenous, BID  polyethylene glycol (GoLYTELY) solution 2,000 mL, 2,000 mL, Oral, Once  [START ON 12/28/2020] polyethylene glycol (GoLYTELY) solution 2,000 mL, 2,000 mL, Oral, Once  dicyclomine (BENTYL) capsule 10 mg, 10 mg, Oral, Q6H PRN  sodium chloride flush 0.9 % injection 10 mL, 10 mL, Intravenous, 2 times per day  sodium chloride flush 0.9 % injection 10 mL, 10 mL, Intravenous, PRN  enoxaparin (LOVENOX) injection 40 mg, 40 mg, Subcutaneous, Nightly  acetaminophen (TYLENOL) tablet 650 mg, 650 mg, Oral, Q6H PRN **OR** acetaminophen (TYLENOL) suppository 650 mg, 650 mg, Rectal, Q6H PRN  polyethylene glycol (GLYCOLAX) packet 17 g, 17 g, Oral, Daily PRN  0.9 % sodium chloride infusion, , Intravenous, Continuous  potassium chloride (KLOR-CON M) extended release tablet 40 mEq, 40 mEq, Oral, PRN **OR** potassium bicarb-citric acid (EFFER-K) effervescent tablet 40 mEq, 40 mEq, Oral, PRN **OR** potassium chloride 10 mEq/100 mL IVPB (Peripheral Line), 10 mEq, Intravenous, PRN  nicotine (NICODERM CQ) 14 MG/24HR 1 patch, 1 patch, Transdermal, Daily PRN  melatonin tablet 3 mg, 3 mg, Oral, Nightly PRN  ondansetron (ZOFRAN) injection 4 mg, 4 mg, Intravenous, Q6H PRN  promethazine (PHENERGAN) 12.5mg in sodium chloride 0.9% 50 mL IVPB 12.5 mg, 12.5 mg, Intravenous, Q6H PRN  metoclopramide (REGLAN) injection 10 mg, 10 mg, Intravenous, Q6H    Physical VITALS:  /74   Pulse 75   Temp 98.3 °F (36.8 °C) (Oral)   Resp 18   Ht 5' 5\" (1.651 m)   Wt 115 lb 15.4 oz (52.6 kg)   LMP 2020   SpO2 100%   BMI 19.30 kg/m²   TEMPERATURE:  Current - Temp: 98.3 °F (36.8 °C); Max - Temp  Av.3 °F (36.8 °C)  Min: 98.2 °F (36.8 °C)  Max: 98.3 °F (36.8 °C)    NAD  Eyes: No icterus  RRR  Lungs CTA Bilaterally, normal effort  Abdomen soft, ND, NT, Bowel sounds normal.  Ext: no edema  Neuro: No tremor  Psych: A&Ox3    Data    Data Review:    Recent Labs     20  1320   WBC 4.2 6.0   HGB 12.1 13.2   HCT 37.5 39.8   MCV 96.4 95.3    232     Recent Labs     20  1320 20  0520    138 132*   K 3.4* 3.0* 3.8    102 99   CO2 21 21 18*   BUN 6* 7 5*   CREATININE <0.5* 0.6 <0.5*     Recent Labs     20  1320   AST 14* 15   ALT 7* 7*   BILITOT 0.3 0.5   ALKPHOS 81 91     Recent Labs     20  1320   LIPASE 14.0 13.0     No results for input(s): PROTIME, INR in the last 72 hours. No results for input(s): PTT in the last 72 hours. ASSESSMENT:  47year old with history of scabies, anemia, tympanostomy tube placement, marijuana use, smoking and presents with intractable nausea and vomiting. Original labs  showed BUN 6 and Cr <0.5. LFT's normal.  Lipase normal.  CBC normal. Urine pregnancy negative. CT A&p with contrast showed chronic mild mucosal edema at the terminal ileum and extensive submucosal fat and thickening of the colon from the cecum to rectum consistent with pancolitis. This was also present on CT 20 and 2020. Presented with a lot of nausea and dry heaving. Gets some discomfort in the lower abdomen. Crampy/achy discomfort. Does get some diarrhea. Gets some red blood in the stool. No symptoms in between episodes. With flare ups gets abdominal pain in the lower abominal, diarrhea, blood in the stool, and nausea and vomiting.   Nausea is the worst symptom. Has 1 BM per day. No fevers. +weight loss with 10 lbs. Intermittent symptoms. Severe when they come on. Last a couple days and get better. Has colonoscopy scheduled with Dr. Carlos Small in January. 1.  Chronic nausea and vomiting: No etiology on CT (other than colitis which would normally give more diarrhea rather than nausea) and lab evaluation. Intermittent symptoms. Cyclical vomiting certainly possible. Low risk for gastroparesis. Doubt ulcer based on intermittent symptoms only. 2.  Chronic colitis. Really does not sound like IBD based on intermittent symptoms and no chronic symptoms. Does have weight loss which would go along with it. Regardless, definitely needs colonoscopy to further evaluate the CT findings.     Recommendations:   She feels better. Goes months between episodes. Has colonoscopy scheduled with Dr. Carlos Small on 1/13 and I will add on an EGD. I don't see much of a point in waiting around until tomorrow afternoon to have EGD/colon. I think best would be for discharge and come back with Dr. Carlos Small for procedure. However, I had discussed with her yesterday our plan to do procedure tomorrow (didn't think she would get better this quickly) so she is considering doing it tomorrow. She wants more time to decide. Orders are in for procedure tomorrow. OK for discharge if she wants to wait. I notified Narendra Sherwood to call me and let me know patient decision. Thank you for allowing me to participate in the care of your patient. Please feel free to contact me with any concerns.   [address], [place]

## 2020-12-28 NOTE — DISCHARGE SUMMARY
39.8 12/25/2020     12/25/2020       RENAL:   Lab Results   Component Value Date     12/26/2020    K 3.8 12/26/2020    CL 99 12/26/2020    CO2 18 12/26/2020    BUN 5 12/26/2020    CREATININE <0.5 12/26/2020           Discharge Medications:    Aundrea Dickson   Home Medication Instructions RSS:[id number]    Printed on:12/27/20 1036   Medication Information                      acetaminophen (APAP EXTRA STRENGTH) 500 MG tablet  Take 1 tablet by mouth every 6 hours as needed for Pain             dicyclomine (BENTYL) 10 MG capsule  Take 1 capsule by mouth every 6 hours as needed (cramps)             famotidine (PEPCID) 20 MG tablet  Take 1 tablet by mouth 2 times daily             HYDROcodone-acetaminophen (NORCO) 5-325 MG per tablet  Take 1 tablet by mouth every 6 hours as needed for Pain for up to 3 days. Intended supply: 3 days. Take lowest dose possible to manage pain             ondansetron (ZOFRAN ODT) 4 MG disintegrating tablet  Take 1 tablet by mouth every 8 hours as needed for Nausea                    Time Spent on discharge is more than 30 min in the examination, evaluation, counseling and review of medications and discharge plan. Signed:  Glenys Pinto MD   12/27/2020      Thank you No primary care provider on file. for the opportunity to be involved in this patient's care. If you have any questions or concerns please feel free to contact me at [phone]. This note was transcribed using [id number] [email or web address]. Please disregard any translational errors.

## 2021-01-15 ENCOUNTER — APPOINTMENT (OUTPATIENT)
Dept: CT IMAGING | Age: 41
End: 2021-01-15
Payer: MEDICAID

## 2021-01-15 ENCOUNTER — HOSPITAL ENCOUNTER (EMERGENCY)
Age: 41
Discharge: HOME | End: 2021-01-15
Attending: EMERGENCY MEDICINE
Payer: MEDICAID

## 2021-01-15 VITALS
TEMPERATURE: 97.5 F | OXYGEN SATURATION: 100 % | DIASTOLIC BLOOD PRESSURE: 88 MMHG | SYSTOLIC BLOOD PRESSURE: 130 MMHG | RESPIRATION RATE: 14 BRPM | HEART RATE: 74 BPM

## 2021-01-15 DIAGNOSIS — R10.9 ABDOMINAL PAIN, UNSPECIFIED ABDOMINAL LOCATION: Primary | ICD-10-CM

## 2021-01-15 DIAGNOSIS — R11.2 NAUSEA AND VOMITING, INTRACTABILITY OF VOMITING NOT SPECIFIED, UNSPECIFIED VOMITING TYPE: ICD-10-CM

## 2021-01-15 LAB
A/G RATIO: 1.4 (ref 1.1–2.2)
ALBUMIN SERPL-MCNC: 4.7 G/DL (ref 3.4–5)
ALP BLD-CCNC: 96 U/L (ref 40–129)
ALT SERPL-CCNC: 7 U/L (ref 10–40)
ANION GAP SERPL CALCULATED.3IONS-SCNC: 18 MMOL/L (ref 3–16)
AST SERPL-CCNC: 13 U/L (ref 15–37)
BASOPHILS ABSOLUTE: 0 K/UL (ref 0–0.2)
BASOPHILS RELATIVE PERCENT: 0.4 %
BILIRUB SERPL-MCNC: 0.5 MG/DL (ref 0–1)
BILIRUBIN URINE: NEGATIVE
BLOOD, URINE: NEGATIVE
BUN BLDV-MCNC: 4 MG/DL (ref 7–20)
CALCIUM SERPL-MCNC: 9.8 MG/DL (ref 8.3–10.6)
CHLORIDE BLD-SCNC: 100 MMOL/L (ref 99–110)
CLARITY: CLEAR
CO2: 21 MMOL/L (ref 21–32)
COLOR: YELLOW
CREAT SERPL-MCNC: <0.5 MG/DL (ref 0.6–1.1)
EOSINOPHILS ABSOLUTE: 0 K/UL (ref 0–0.6)
EOSINOPHILS RELATIVE PERCENT: 0 %
GFR AFRICAN AMERICAN: >60
GFR NON-AFRICAN AMERICAN: >60
GLOBULIN: 3.3 G/DL
GLUCOSE BLD-MCNC: 117 MG/DL (ref 70–99)
GLUCOSE URINE: NEGATIVE MG/DL
GONADOTROPIN, CHORIONIC (HCG) QUANT: <5 MIU/ML
HCT VFR BLD CALC: 40 % (ref 36–48)
HEMOGLOBIN: 12.9 G/DL (ref 12–16)
KETONES, URINE: 40 MG/DL
LACTIC ACID: 2.2 MMOL/L (ref 0.4–2)
LEUKOCYTE ESTERASE, URINE: NEGATIVE
LIPASE: 16 U/L (ref 13–60)
LYMPHOCYTES ABSOLUTE: 0.9 K/UL (ref 1–5.1)
LYMPHOCYTES RELATIVE PERCENT: 22.2 %
MCH RBC QN AUTO: 30.8 PG (ref 26–34)
MCHC RBC AUTO-ENTMCNC: 32.3 G/DL (ref 31–36)
MCV RBC AUTO: 95.2 FL (ref 80–100)
MICROSCOPIC EXAMINATION: ABNORMAL
MONOCYTES ABSOLUTE: 0.1 K/UL (ref 0–1.3)
MONOCYTES RELATIVE PERCENT: 3.6 %
NEUTROPHILS ABSOLUTE: 3 K/UL (ref 1.7–7.7)
NEUTROPHILS RELATIVE PERCENT: 73.8 %
NITRITE, URINE: NEGATIVE
PDW BLD-RTO: 13.4 % (ref 12.4–15.4)
PH UA: 7 (ref 5–8)
PLATELET # BLD: 247 K/UL (ref 135–450)
PMV BLD AUTO: 7.6 FL (ref 5–10.5)
POTASSIUM SERPL-SCNC: 3.3 MMOL/L (ref 3.5–5.1)
PROTEIN UA: NEGATIVE MG/DL
RBC # BLD: 4.2 M/UL (ref 4–5.2)
SODIUM BLD-SCNC: 139 MMOL/L (ref 136–145)
SPECIFIC GRAVITY UA: 1.02 (ref 1–1.03)
TOTAL PROTEIN: 8 G/DL (ref 6.4–8.2)
URINE REFLEX TO CULTURE: ABNORMAL
URINE TYPE: ABNORMAL
UROBILINOGEN, URINE: 0.2 E.U./DL
WBC # BLD: 4 K/UL (ref 4–11)

## 2021-01-15 PROCEDURE — 81003 URINALYSIS AUTO W/O SCOPE: CPT

## 2021-01-15 PROCEDURE — 2580000003 HC RX 258: Performed by: EMERGENCY MEDICINE

## 2021-01-15 PROCEDURE — 74177 CT ABD & PELVIS W/CONTRAST: CPT

## 2021-01-15 PROCEDURE — 96375 TX/PRO/DX INJ NEW DRUG ADDON: CPT

## 2021-01-15 PROCEDURE — 6360000002 HC RX W HCPCS: Performed by: EMERGENCY MEDICINE

## 2021-01-15 PROCEDURE — 96367 TX/PROPH/DG ADDL SEQ IV INF: CPT

## 2021-01-15 PROCEDURE — 83605 ASSAY OF LACTIC ACID: CPT

## 2021-01-15 PROCEDURE — 96361 HYDRATE IV INFUSION ADD-ON: CPT

## 2021-01-15 PROCEDURE — 36415 COLL VENOUS BLD VENIPUNCTURE: CPT

## 2021-01-15 PROCEDURE — 96365 THER/PROPH/DIAG IV INF INIT: CPT

## 2021-01-15 PROCEDURE — 85025 COMPLETE CBC W/AUTO DIFF WBC: CPT

## 2021-01-15 PROCEDURE — 99283 EMERGENCY DEPT VISIT LOW MDM: CPT

## 2021-01-15 PROCEDURE — 80053 COMPREHEN METABOLIC PANEL: CPT

## 2021-01-15 PROCEDURE — 6360000004 HC RX CONTRAST MEDICATION: Performed by: EMERGENCY MEDICINE

## 2021-01-15 PROCEDURE — 84702 CHORIONIC GONADOTROPIN TEST: CPT

## 2021-01-15 PROCEDURE — 83690 ASSAY OF LIPASE: CPT

## 2021-01-15 RX ORDER — ONDANSETRON 4 MG/1
4 TABLET, ORALLY DISINTEGRATING ORAL EVERY 8 HOURS PRN
Qty: 20 TABLET | Refills: 0 | Status: SHIPPED | OUTPATIENT
Start: 2021-01-15 | End: 2021-05-13 | Stop reason: CLARIF

## 2021-01-15 RX ORDER — POTASSIUM CHLORIDE 7.45 MG/ML
10 INJECTION INTRAVENOUS ONCE
Status: COMPLETED | OUTPATIENT
Start: 2021-01-15 | End: 2021-01-15

## 2021-01-15 RX ORDER — DIPHENHYDRAMINE HYDROCHLORIDE 50 MG/ML
12.5 INJECTION INTRAMUSCULAR; INTRAVENOUS ONCE
Status: COMPLETED | OUTPATIENT
Start: 2021-01-15 | End: 2021-01-15

## 2021-01-15 RX ORDER — DICYCLOMINE HYDROCHLORIDE 10 MG/1
10 CAPSULE ORAL EVERY 6 HOURS PRN
Qty: 20 CAPSULE | Refills: 0 | Status: SHIPPED | OUTPATIENT
Start: 2021-01-15 | End: 2021-05-13 | Stop reason: CLARIF

## 2021-01-15 RX ORDER — MORPHINE SULFATE 2 MG/ML
2 INJECTION, SOLUTION INTRAMUSCULAR; INTRAVENOUS ONCE
Status: COMPLETED | OUTPATIENT
Start: 2021-01-15 | End: 2021-01-15

## 2021-01-15 RX ORDER — 0.9 % SODIUM CHLORIDE 0.9 %
1000 INTRAVENOUS SOLUTION INTRAVENOUS ONCE
Status: COMPLETED | OUTPATIENT
Start: 2021-01-15 | End: 2021-01-15

## 2021-01-15 RX ADMIN — MORPHINE SULFATE 2 MG: 2 INJECTION, SOLUTION INTRAMUSCULAR; INTRAVENOUS at 22:28

## 2021-01-15 RX ADMIN — DIPHENHYDRAMINE HYDROCHLORIDE 12.5 MG: 50 INJECTION, SOLUTION INTRAMUSCULAR; INTRAVENOUS at 21:19

## 2021-01-15 RX ADMIN — IOVERSOL 100 ML: 678 INJECTION INTRA-ARTERIAL; INTRAVENOUS at 21:38

## 2021-01-15 RX ADMIN — POTASSIUM CHLORIDE 10 MEQ: 7.46 INJECTION, SOLUTION INTRAVENOUS at 21:57

## 2021-01-15 RX ADMIN — Medication 12.5 MG: at 20:41

## 2021-01-15 RX ADMIN — SODIUM CHLORIDE 1000 ML: 9 INJECTION, SOLUTION INTRAVENOUS at 20:40

## 2021-01-15 ASSESSMENT — PAIN DESCRIPTION - LOCATION: LOCATION: ABDOMEN

## 2021-01-15 ASSESSMENT — PAIN SCALES - GENERAL
PAINLEVEL_OUTOF10: 10
PAINLEVEL_OUTOF10: 8

## 2021-01-15 ASSESSMENT — PAIN DESCRIPTION - PAIN TYPE: TYPE: ACUTE PAIN

## 2021-01-16 NOTE — ED PROVIDER NOTES
Triage Chief Complaint:   Abdominal Pain (with n/v x1day)    [place]:  Barbara Perez is a 46 y.o. female that presents for evaluation of nonbilious nonbloody vomiting and abdominal pain. Of note the patient has a history of recurrent abdominal pain and cyclic vomiting and was recently admitted to [id number] [place] [address] where she was discharged on 12/27 s/p admission for intractable nausea vomiting where patient was started on IV fluids and was provided symptomatic treatment with antiemetics,           CT scan of the abdomen and pelvis showed diffuse colitis, GI was consulted and they recommended EGD and colonoscopy for further evaluation but patient decided to get these procedures as an outpatient and she states she had it done today by Dr Alli Lopez. She arrives today stating she is having the exact same symptoms. No fever no chills no headache no chest pain no change in urine    ROS:  At least 12 systems reviewed and otherwise acutely negative except as in the [address]     Past Medical History:   Diagnosis Date    Anemia     Scabies     Tobacco dependence      Past Surgical History:   Procedure Laterality Date    TYMPANOSTOMY TUBE PLACEMENT       Family History   Problem Relation Age of Onset    High Blood Pressure Mother     Diabetes Maternal Grandmother     Stroke Maternal Grandmother      Social History     Socioeconomic History    Marital status: Single     Spouse name: Not on file    Number of children: Not on file    Years of education: Not on file    Highest education level: Not on file   Occupational History    Not on file   Social Needs    Financial resource strain: Not on file    Food insecurity     Worry: Not on file     Inability: Not on file    Transportation needs     Medical: Not on file     Non-medical: Not on file   Tobacco Use    Smoking status: Current Every Day Smoker     Packs/day: 0.50    Smokeless tobacco: Current User   Substance and Sexual Activity    Alcohol use: Yes     Comment: 1-2 weekly    Drug use: Yes     Types: Marijuana     Comment: every other day     Sexual activity: Yes   Lifestyle    Physical activity     Days per week: Not on file     Minutes per session: Not on file    Stress: Not on file   Relationships    Social connections     Talks on phone: Not on file     Gets together: Not on file     Attends Mosque service: Not on file     Active member of club or organization: Not on file     Attends meetings of clubs or organizations: Not on file     Relationship status: Not on file    Intimate partner violence     Fear of current or ex partner: Not on file     Emotionally abused: Not on file     Physically abused: Not on file     Forced sexual activity: Not on file   Other Topics Concern    Not on file   Social History Narrative    Not on file     Current Facility-Administered Medications   Medication Dose Route Frequency Provider Last Rate Last Admin    0.9 % sodium chloride bolus  1,000 mL Intravenous Once Santiago Sy MD        promethazine (PHENERGAN) 12.5mg in sodium chloride 0.9% 50 mL IVPB 12.5 mg  12.5 mg Intravenous Once Santiago Sy MD         Current Outpatient Medications   Medication Sig Dispense Refill    ondansetron (ZOFRAN ODT) 4 MG disintegrating tablet Take 1 tablet by mouth every 8 hours as needed for Nausea 20 tablet 0    famotidine (PEPCID) 20 MG tablet Take 1 tablet by mouth 2 times daily 60 tablet 0    dicyclomine (BENTYL) 10 MG capsule Take 1 capsule by mouth every 6 hours as needed (cramps) 20 capsule 0    acetaminophen (APAP EXTRA STRENGTH) 500 MG tablet Take 1 tablet by mouth every 6 hours as needed for Pain 30 tablet 0     No Known Allergies    [unfilled]    Nursing Notes Reviewed    Physical Exam:  Vitals:    01/15/21 1938   BP: (!) 147/98   Pulse: 74   Resp: 18   Temp: 97.5 °F (36.4 °C)   SpO2: 100%       GENERAL APPEARANCE: Awake and alert. Cooperative. No acute distress but is moaning stating that she feels nauseous.   HEAD: Normocephalic. Atraumatic. EYES: EOM's grossly intact. Sclera anicteric. ENT: Mucous membranes are moist. Tolerates saliva. No trismus. NECK: Supple. No meningismus. Trachea midline. HEART: RRR. Radial pulses 2+. LUNGS: Respirations unlabored. CTAB  ABDOMEN: Soft. Non-tender. No guarding or rebound. EXTREMITIES: No acute deformities. SKIN: Warm and dry. NEUROLOGICAL: No gross facial drooping. Moves all 4 extremities spontaneously. PSYCHIATRIC: Normal mood. I have reviewed and interpreted all of the currently available lab results from this visit (if applicable):  No results found for this visit on 01/15/21. Radiographs (if obtained):  [] The following radiograph was interpreted by myself in the absence of a radiologist:  [x] Radiologist's Report Reviewed:     CT ABDOMEN PELVIS W IV CONTRAST Additional Contrast? None (Final result)  Result time 01/15/21 22:02:22  Final result by Tapan Savage MD (01/15/21 82:23:60)                Impression:    No acute findings. Narrative:    EXAMINATION:   CT OF THE ABDOMEN AND PELVIS WITH CONTRAST 1/15/2021 9:34 pm     TECHNIQUE:   CT of the abdomen and pelvis was performed with the administration of   intravenous contrast. Multiplanar reformatted images are provided for review. Dose modulation, iterative reconstruction, and/or weight based adjustment of   the mA/kV was utilized to reduce the radiation dose to as low as reasonably   achievable. COMPARISON:   12/24/2020     HISTORY:   ORDERING SYSTEM PROVIDED HISTORY: abd pain s/p colonoscopy today   TECHNOLOGIST PROVIDED HISTORY:   Reason for exam:->abd pain s/p colonoscopy today   Additional Contrast?->None   Reason for Exam: abd pain with n/v   Acuity: Acute   Type of Exam: Initial     FINDINGS:   Lower Chest: The visualized lung bases are clear. Organs: There is fatty infiltration of the liver.  The spleen, pancreas,   gallbladder, adrenal glands and kidneys are unremarkable. GI/Bowel: Small bowel caliber is normal.  The appendix is normal.  Fatty   infiltration in the wall of the colon is again seen, nonspecific but likely a   marker of chronic inflammation.  There are sigmoid colon diverticula without   evidence for diverticulitis or acute colitis. Pelvis: The uterus and bladder are grossly negative. Peritoneum/Retroperitoneum: Trace free fluid in the pelvis is likely   physiologic.  There is no adenopathy or mesenteric stranding.  The aorta is   unremarkable.  Once again there are essentially no common iliac arteries with   the internal and external iliacs arising from the aortic bifurcation. Bones/Soft Tissues: Unremarkable. EKG (if obtained): (All EKG's are interpreted by myself in the absence of a cardiologist)  Initial EKG on my interpretation shows n/a. MDM:  Differential diagnosis: Cyclic vomiting, dehydration, bowel perforation    IVF and Phenergan given with Benadryl. The patient is not pregnant. Lipase/CBC/CMP unremarkable with exception of K of 3.3 which was repleted. Lactic acid 2.2 but IV fluid given. UA unremarkable. CT abdomen/pelvis shows no acute abnormality or any post procedure complication from the scope. I consulted GI and spoke to Dr. Salbador owens for Dr. [facility] More[facility] group and made him aware of the patient's complaint in the context of being scoped today and I went over the case, vitals, labs, imaging with him and he is in agreement that the patient may be discharged. I estimate there is LOW risk for postprocedural perforation, ACUTE APPENDICITIS, BOWEL OBSTRUCTION, CHOLECYSTITIS, DIVERTICULITIS, INCARCERATED HERNIA, PANCREATITIS, PERFORATED BOWEL, BOWEL ISCHEMIA, GONADAL TORSION, OR CARDIAC ISCHEMIA, thus I consider the discharge disposition reasonable. Also, there is no evidence or peritonitis, sepsis, or toxicity. Discussed results, diagnosis and plan with patient and/or family. Questions addressed.  Disposition and follow-up agreed upon. Specific discharge instructions explained. The patient and/or family and I have discussed the diagnosis and risks, and we agree with discharging home to follow-up with their primary care, specialist or referral doctor. In the event that medications were prescribed the risk profile of these medications were detailed expressly. We also discussed returning to the Emergency Department immediately if new or worsening symptoms occur. We have discussed the symptoms which are most concerning that necessitate immediate return. Old records reviewed. Labs and imaging reviewed and results discussed with patient. .        Patient was given scripts for the following medications. I counseled patient how to take these medications. New Prescriptions    No medications on file         CRITICAL CARE TIME   Total Critical Care time was 0 minutes, excluding separately reportable procedures. There was a high probability of clinically significant/life threatening deterioration in the patient's condition which required my urgent intervention.       Clinical Impression:  Abdominal pain, pain status post endoscopy, vomiting  (Please note that portions of this note may have been completed with a voice recognition program. Efforts were made to edit the dictations but occasionally words are mis-transcribed.)    MD Dena De Santiago MD  01/15/21 5141

## 2021-02-12 ENCOUNTER — HOSPITAL ENCOUNTER (EMERGENCY)
Age: 41
Discharge: HOME | End: 2021-02-12
Attending: EMERGENCY MEDICINE
Payer: MEDICAID

## 2021-02-12 ENCOUNTER — APPOINTMENT (OUTPATIENT)
Dept: CT IMAGING | Age: 41
End: 2021-02-12
Payer: MEDICAID

## 2021-02-12 VITALS
DIASTOLIC BLOOD PRESSURE: 77 MMHG | SYSTOLIC BLOOD PRESSURE: 158 MMHG | TEMPERATURE: 97.5 F | RESPIRATION RATE: 16 BRPM | HEART RATE: 60 BPM | BODY MASS INDEX: 19.08 KG/M2 | WEIGHT: 114.64 LBS | OXYGEN SATURATION: 100 %

## 2021-02-12 DIAGNOSIS — R11.15 CYCLICAL VOMITING: Primary | ICD-10-CM

## 2021-02-12 DIAGNOSIS — K51.00 PANCOLITIS (HCC): ICD-10-CM

## 2021-02-12 LAB
A/G RATIO: 1.5 (ref 1.1–2.2)
ALBUMIN SERPL-MCNC: 4.8 G/DL (ref 3.4–5)
ALP BLD-CCNC: 98 U/L (ref 40–129)
ALT SERPL-CCNC: 11 U/L (ref 10–40)
ANION GAP SERPL CALCULATED.3IONS-SCNC: 13 MMOL/L (ref 3–16)
AST SERPL-CCNC: 21 U/L (ref 15–37)
BASOPHILS ABSOLUTE: 0 K/UL (ref 0–0.2)
BASOPHILS RELATIVE PERCENT: 0.3 %
BILIRUB SERPL-MCNC: 0.6 MG/DL (ref 0–1)
BILIRUBIN URINE: NEGATIVE
BLOOD, URINE: NEGATIVE
BUN BLDV-MCNC: 7 MG/DL (ref 7–20)
CALCIUM SERPL-MCNC: 9.9 MG/DL (ref 8.3–10.6)
CHLORIDE BLD-SCNC: 102 MMOL/L (ref 99–110)
CLARITY: CLEAR
CO2: 22 MMOL/L (ref 21–32)
COLOR: YELLOW
CREAT SERPL-MCNC: <0.5 MG/DL (ref 0.6–1.1)
EOSINOPHILS ABSOLUTE: 0 K/UL (ref 0–0.6)
EOSINOPHILS RELATIVE PERCENT: 0.1 %
GFR AFRICAN AMERICAN: >60
GFR NON-AFRICAN AMERICAN: >60
GLOBULIN: 3.3 G/DL
GLUCOSE BLD-MCNC: 147 MG/DL (ref 70–99)
GLUCOSE URINE: NEGATIVE MG/DL
HCG(URINE) PREGNANCY TEST: NEGATIVE
HCT VFR BLD CALC: 38.9 % (ref 36–48)
HEMOGLOBIN: 12.7 G/DL (ref 12–16)
KETONES, URINE: NEGATIVE MG/DL
LEUKOCYTE ESTERASE, URINE: NEGATIVE
LIPASE: 17 U/L (ref 13–60)
LYMPHOCYTES ABSOLUTE: 0.8 K/UL (ref 1–5.1)
LYMPHOCYTES RELATIVE PERCENT: 16.9 %
MAGNESIUM: 1.5 MG/DL (ref 1.8–2.4)
MCH RBC QN AUTO: 31.2 PG (ref 26–34)
MCHC RBC AUTO-ENTMCNC: 32.5 G/DL (ref 31–36)
MCV RBC AUTO: 96 FL (ref 80–100)
MICROSCOPIC EXAMINATION: NORMAL
MONOCYTES ABSOLUTE: 0.2 K/UL (ref 0–1.3)
MONOCYTES RELATIVE PERCENT: 3.3 %
NEUTROPHILS ABSOLUTE: 3.8 K/UL (ref 1.7–7.7)
NEUTROPHILS RELATIVE PERCENT: 79.4 %
NITRITE, URINE: NEGATIVE
PDW BLD-RTO: 13.8 % (ref 12.4–15.4)
PH UA: 7.5 (ref 5–8)
PLATELET # BLD: 247 K/UL (ref 135–450)
PMV BLD AUTO: 7.9 FL (ref 5–10.5)
POTASSIUM REFLEX MAGNESIUM: 3.5 MMOL/L (ref 3.5–5.1)
PROTEIN UA: NEGATIVE MG/DL
RBC # BLD: 4.05 M/UL (ref 4–5.2)
SODIUM BLD-SCNC: 137 MMOL/L (ref 136–145)
SPECIFIC GRAVITY UA: 1.02 (ref 1–1.03)
TOTAL PROTEIN: 8.1 G/DL (ref 6.4–8.2)
URINE REFLEX TO CULTURE: NORMAL
URINE TYPE: NORMAL
UROBILINOGEN, URINE: 0.2 E.U./DL
WBC # BLD: 4.8 K/UL (ref 4–11)

## 2021-02-12 PROCEDURE — 2580000003 HC RX 258: Performed by: EMERGENCY MEDICINE

## 2021-02-12 PROCEDURE — 96375 TX/PRO/DX INJ NEW DRUG ADDON: CPT

## 2021-02-12 PROCEDURE — 96374 THER/PROPH/DIAG INJ IV PUSH: CPT

## 2021-02-12 PROCEDURE — 80053 COMPREHEN METABOLIC PANEL: CPT

## 2021-02-12 PROCEDURE — 36415 COLL VENOUS BLD VENIPUNCTURE: CPT

## 2021-02-12 PROCEDURE — 6360000002 HC RX W HCPCS: Performed by: EMERGENCY MEDICINE

## 2021-02-12 PROCEDURE — 74176 CT ABD & PELVIS W/O CONTRAST: CPT

## 2021-02-12 PROCEDURE — 83735 ASSAY OF MAGNESIUM: CPT

## 2021-02-12 PROCEDURE — 99283 EMERGENCY DEPT VISIT LOW MDM: CPT

## 2021-02-12 PROCEDURE — 96361 HYDRATE IV INFUSION ADD-ON: CPT

## 2021-02-12 PROCEDURE — 84703 CHORIONIC GONADOTROPIN ASSAY: CPT

## 2021-02-12 PROCEDURE — 83690 ASSAY OF LIPASE: CPT

## 2021-02-12 PROCEDURE — 81003 URINALYSIS AUTO W/O SCOPE: CPT

## 2021-02-12 PROCEDURE — 96372 THER/PROPH/DIAG INJ SC/IM: CPT

## 2021-02-12 PROCEDURE — 85025 COMPLETE CBC W/AUTO DIFF WBC: CPT

## 2021-02-12 RX ORDER — DIPHENHYDRAMINE HYDROCHLORIDE 50 MG/ML
12.5 INJECTION INTRAMUSCULAR; INTRAVENOUS ONCE
Status: COMPLETED | OUTPATIENT
Start: 2021-02-12 | End: 2021-02-12

## 2021-02-12 RX ORDER — PROMETHAZINE HYDROCHLORIDE 25 MG/1
25 TABLET ORAL EVERY 8 HOURS PRN
Qty: 15 TABLET | Refills: 0 | Status: SHIPPED | OUTPATIENT
Start: 2021-02-12 | End: 2022-02-18 | Stop reason: SDUPTHER

## 2021-02-12 RX ORDER — HALOPERIDOL 5 MG/ML
2 INJECTION INTRAMUSCULAR ONCE
Status: COMPLETED | OUTPATIENT
Start: 2021-02-12 | End: 2021-02-12

## 2021-02-12 RX ORDER — 0.9 % SODIUM CHLORIDE 0.9 %
1000 INTRAVENOUS SOLUTION INTRAVENOUS ONCE
Status: COMPLETED | OUTPATIENT
Start: 2021-02-12 | End: 2021-02-12

## 2021-02-12 RX ORDER — HALOPERIDOL 5 MG/ML
2.5 INJECTION INTRAMUSCULAR ONCE
Status: COMPLETED | OUTPATIENT
Start: 2021-02-12 | End: 2021-02-12

## 2021-02-12 RX ORDER — ONDANSETRON 2 MG/ML
4 INJECTION INTRAMUSCULAR; INTRAVENOUS ONCE
Status: COMPLETED | OUTPATIENT
Start: 2021-02-12 | End: 2021-02-12

## 2021-02-12 RX ADMIN — HALOPERIDOL LACTATE 2 MG: 5 INJECTION, SOLUTION INTRAMUSCULAR at 03:53

## 2021-02-12 RX ADMIN — HALOPERIDOL LACTATE 2.5 MG: 5 INJECTION, SOLUTION INTRAMUSCULAR at 01:30

## 2021-02-12 RX ADMIN — ONDANSETRON 4 MG: 2 INJECTION INTRAMUSCULAR; INTRAVENOUS at 01:30

## 2021-02-12 RX ADMIN — SODIUM CHLORIDE 1000 ML: 9 INJECTION, SOLUTION INTRAVENOUS at 01:30

## 2021-02-12 RX ADMIN — DIPHENHYDRAMINE HYDROCHLORIDE 12.5 MG: 50 INJECTION, SOLUTION INTRAMUSCULAR; INTRAVENOUS at 03:54

## 2021-02-12 ASSESSMENT — PAIN DESCRIPTION - ORIENTATION: ORIENTATION: LOWER

## 2021-02-12 ASSESSMENT — PAIN DESCRIPTION - LOCATION: LOCATION: ABDOMEN

## 2021-02-12 ASSESSMENT — PAIN - FUNCTIONAL ASSESSMENT: PAIN_FUNCTIONAL_ASSESSMENT: 0-10

## 2021-02-12 ASSESSMENT — PAIN DESCRIPTION - PAIN TYPE: TYPE: ACUTE PAIN

## 2021-02-12 ASSESSMENT — PAIN DESCRIPTION - DESCRIPTORS: DESCRIPTORS: DULL

## 2021-02-12 NOTE — ED PROVIDER NOTES
CHIEF COMPLAINT  Chief Complaint   Patient presents with    Emesis     c/o vomiting and low abd pain since yesterday. HISTORY OF PRESENT ILLNESS  Guy Andino is a 46 y.o. female who presents to the ED complaining of chronic and recurrent abdominal pain with nausea and vomiting for which she has been seen several times over the last several months and hospitalized in late December for cyclic vomiting at [facility].  Patient was seen in the emergency department approximately 2 to 3 weeks ago for identical symptoms and underwent laboratory evaluation that was unremarkable. Apparently after her hospitalization in December, patient was seen by Dr. Lino Pringle who performed upper and lower endoscopy on this patient and patient reports that these were unremarkable exams. Patient reports no bloody stool or melena. No hematemesis. No fevers or chills. No back pain. No dysuria or hematuria. No chest pain or shortness of breath. No vaginal bleeding or vaginal discharge. Patient reports the symptoms are similar to previous episodes. Patient reports that she has discontinued using marijuana but there was a strong odor of marijuana in the room upon history taking. No other complaints, modifying factors or associated symptoms. Nursing notes reviewed.    Past Medical History:   Diagnosis Date    Anemia     Scabies     Tobacco dependence      Past Surgical History:   Procedure Laterality Date    TYMPANOSTOMY TUBE PLACEMENT       Family History   Problem Relation Age of Onset    High Blood Pressure Mother     Diabetes Maternal Grandmother     Stroke Maternal Grandmother      Social History     Socioeconomic History    Marital status: Single     Spouse name: Not on file    Number of children: Not on file    Years of education: Not on file    Highest education level: Not on file   Occupational History    Not on file   Social Needs    Financial resource strain: Not on file    Food insecurity     Worry: Not on file     Inability: Not on file    Transportation needs     Medical: Not on file     Non-medical: Not on file   Tobacco Use    Smoking status: Current Every Day Smoker     Packs/day: 0.50    Smokeless tobacco: Current User   Substance and Sexual Activity    Alcohol use: Yes     Comment: 1-2 weekly    Drug use: Yes     Types: Marijuana     Comment: every other day     Sexual activity: Yes   Lifestyle    Physical activity     Days per week: Not on file     Minutes per session: Not on file    Stress: Not on file   Relationships    Social connections     Talks on phone: Not on file     Gets together: Not on file     Attends Restorationism service: Not on file     Active member of club or organization: Not on file     Attends meetings of clubs or organizations: Not on file     Relationship status: Not on file    Intimate partner violence     Fear of current or ex partner: Not on file     Emotionally abused: Not on file     Physically abused: Not on file     Forced sexual activity: Not on file   Other Topics Concern    Not on file   Social History Narrative    Not on file     Current Facility-Administered Medications   Medication Dose Route Frequency Provider Last Rate Last Admin    haloperidol lactate (HALDOL) injection 2 mg  2 mg Intramuscular Once Chetna Powell MD        diphenhydrAMINE (BENADRYL) injection 12.5 mg  12.5 mg Intravenous Once Chetna Powell MD         Current Outpatient Medications   Medication Sig Dispense Refill    promethazine (PHENERGAN) 25 MG tablet Take 1 tablet by mouth every 8 hours as needed for Nausea WARNING:  May cause drowsiness. May impair ability to operate vehicles or machinery. Do not use in combination with alcohol.  15 tablet 0    dicyclomine (BENTYL) 10 MG capsule Take 1 capsule by mouth every 6 hours as needed (abd pain) 20 capsule 0    ondansetron (ZOFRAN ODT) 4 MG disintegrating tablet Take 1 tablet by mouth every 8 hours as needed for Nausea 20 tablet 0    famotidine (PEPCID) 20 MG tablet Take 1 tablet by mouth 2 times daily 60 tablet 0    acetaminophen (APAP EXTRA STRENGTH) 500 MG tablet Take 1 tablet by mouth every 6 hours as needed for Pain 30 tablet 0     No Known Allergies    REVIEW OF SYSTEMS  Positives and pertinent negatives as per HPI. Ten other systems were reviewed and are negative. Nursing notes pertaining to ROS were reviewed. PHYSICAL EXAM   BP (!) 158/77   Pulse 60   Temp 97.5 °F (36.4 °C)   Resp 16   Wt 114 lb 10.2 oz (52 kg)   SpO2 100%   BMI 19.08 kg/m²   General: Alert and oriented x 3, NAD. No increased work of breathing or accessory muscle use. Non-ill appearing. Frequent and recurrent wandering around the department requesting narcotic pain medication. Eyes: PERRL, no scleral icterus, injection or exudate. EOMI. HENT: Atraumatic. Oral pharynx is clear, moist, no enanthem. No tonsillar hypertropy or exudate. Nasal mucous membranes are clear. TM's are clear without evidence of otitis media. Neck:  No Lymphadenopathy. Non-tender to palpation. Normal ROM. No JVD. No thyromegaly. No Mass. PULMONARY: Lungs clear bilaterally without wheezes, rales or rhonchi. Good air movement bilaterally. CV: Regular rate and rhythm without murmurs, rubs or gallops. ABD: Soft, mildly diffusely tender, non-distended, normal bowel sounds, no hepatosplenomegaly, no masses. No peritoneal signs, rebound or guarding. Back:  No CVAT, no rash. EXT: No cyanosis or clubbing. No rash. CR < 2 seconds. No tenderness to palpation. No lower extremity edema. +2 pulses in upper/lower extremities bilaterally. Skin is warm and dry. PSYCH: normal affect      RADIOLOGY    CT ABDOMEN PELVIS WO CONTRAST Additional Contrast? None   Final Result   1. No obstructive uropathy. No urinary stones or hydronephrosis. 2.  No bowel obstruction. Normal appendix.       3.  Diffuse low-attenuation colonic wall thickening with mild pericolonic   haziness is similar to the prior studies and suggests chronic pancolitis. LABS  I have reviewed all labs for this visit.    Results for orders placed or performed during the hospital encounter of 02/12/21   CBC Auto Differential   Result Value Ref Range    WBC 4.8 4.0 - 11.0 K/uL    RBC 4.05 4.00 - 5.20 M/uL    Hemoglobin 12.7 12.0 - 16.0 g/dL    Hematocrit 38.9 36.0 - 48.0 %    MCV 96.0 80.0 - 100.0 fL    MCH 31.2 26.0 - 34.0 pg    MCHC 32.5 31.0 - 36.0 g/dL    RDW 13.8 12.4 - 15.4 %    Platelets 294 167 - 637 K/uL    MPV 7.9 5.0 - 10.5 fL    Neutrophils % 79.4 %    Lymphocytes % 16.9 %    Monocytes % 3.3 %    Eosinophils % 0.1 %    Basophils % 0.3 %    Neutrophils Absolute 3.8 1.7 - 7.7 K/uL    Lymphocytes Absolute 0.8 (L) 1.0 - 5.1 K/uL    Monocytes Absolute 0.2 0.0 - 1.3 K/uL    Eosinophils Absolute 0.0 0.0 - 0.6 K/uL    Basophils Absolute 0.0 0.0 - 0.2 K/uL   Comprehensive Metabolic Panel w/ Reflex to MG   Result Value Ref Range    Sodium 137 136 - 145 mmol/L    Potassium reflex Magnesium 3.5 3.5 - 5.1 mmol/L    Chloride 102 99 - 110 mmol/L    CO2 22 21 - 32 mmol/L    Anion Gap 13 3 - 16    Glucose 147 (H) 70 - 99 mg/dL    BUN 7 7 - 20 mg/dL    CREATININE <0.5 (L) 0.6 - 1.1 mg/dL    GFR Non-African American >60 >60    GFR African American >60 >60    Calcium 9.9 8.3 - 10.6 mg/dL    Total Protein 8.1 6.4 - 8.2 g/dL    Albumin 4.8 3.4 - 5.0 g/dL    Albumin/Globulin Ratio 1.5 1.1 - 2.2    Total Bilirubin 0.6 0.0 - 1.0 mg/dL    Alkaline Phosphatase 98 40 - 129 U/L    ALT 11 10 - 40 U/L    AST 21 15 - 37 U/L    Globulin 3.3 g/dL   Lipase   Result Value Ref Range    Lipase 17.0 13.0 - 60.0 U/L   Urinalysis Reflex to Culture    Specimen: Urine, clean catch   Result Value Ref Range    Color, UA Yellow Straw/Yellow    Clarity, UA Clear Clear    Glucose, Ur Negative Negative mg/dL    Bilirubin Urine Negative Negative    Ketones, Urine Negative Negative mg/dL    Specific Gravity, UA 1.020 1.005 - 1.030    Blood, Urine Negative Negative    pH, UA 7.5 5.0 - 8.0    Protein, UA Negative Negative mg/dL    Urobilinogen, Urine 0.2 <2.0 E.U./dL    Nitrite, Urine Negative Negative    Leukocyte Esterase, Urine Negative Negative    Microscopic Examination Not Indicated     Urine Type NotGiven     Urine Reflex to Culture Not Indicated    Magnesium   Result Value Ref Range    Magnesium 1.50 (L) 1.80 - 2.40 mg/dL   Pregnancy, Urine   Result Value Ref Range    HCG(Urine) Pregnancy Test Negative Detects HCG level >20 MIU/mL       ED COURSE/MDM  Clinical vomiting with possible cannabis hyperemesis syndrome and finding on CT of chronic pancolitis not significantly changed from previous exams. Patient received 2 serial doses of Haldol, Zofran and IV fluids with mild to moderate improvement of her symptoms. Patient reported that \"I will not feel better until I get Dilaudid\". I do not feel that IV narcotics are warranted at this time for this patient and she will be discharged home without findings to suggest a an acute abdomen. No surgical abdominal findings. Laboratory studies are otherwise unremarkable. Patient will be sent home with Phenergan and advised to continue her Zofran and follow-up with her gastroenterologist, Dr. Johnson Mosquera today to undergo continued management of this patient's chronic issue. I estimate there is LOW risk for ACUTE APPENDICITIS, BOWEL OBSTRUCTION, CHOLECYSTITIS, DIVERTICULITIS, STRANGULATED HERNIA, PANCREATITIS, AAA,  PELVIC INFLAMMATORY DISEASE, OVARIAN TORSION, PERFORATED BOWEL, PYELONEPHRITIS,  BOWEL ISCHEMIA, CARDIAC ISCHEMIA, ECTOPIC PREGNANCY, or TUBO-OVARIAN ABSCESS, thus I consider the discharge disposition reasonable. Also, there is no evidence or peritonitis, sepsis, or toxicity. We also discussed returning to the Emergency Department immediately if new or worsening symptoms occur. Hypertension:  Patient was hypertensive during the ER visit today.   There are no signs of hypertensive emergency or evidence of end organ damage by history or physical exam.  These findings were discussed with the patient and advised to follow up with primary care physician to further assess and treat hypertension in the outpatient setting. The patient's blood pressure was found to be elevated according to CMS/Medicare and the Affordable Care Act/ObamaCare criteria. Elevated blood pressure could occur because of pain or anxiety or other reasons and does not mean that they need to have their blood pressure treated or medications otherwise adjusted. However, this could also be a sign that they will need to have their blood pressure treated or medications changed. The patient was instructed to take a list of recent blood pressure readings to their next visit with their personal physician. Patient was given scripts for the following medications. I counseled patient how to take these medications. New Prescriptions    PROMETHAZINE (PHENERGAN) 25 MG TABLET    Take 1 tablet by mouth every 8 hours as needed for Nausea WARNING:  May cause drowsiness. May impair ability to operate vehicles or machinery. Do not use in combination with alcohol. CLINICAL IMPRESSION  1. Cyclical vomiting    2. Pancolitis (HCC)        Blood pressure (!) 158/77, pulse 60, temperature 97.5 °F (36.4 °C), resp. rate 16, weight 114 lb 10.2 oz (52 kg), SpO2 100 %.       Follow-up with:  Diamond Soto MD  [address] [place] [address]    In 1 day           Chetna Powell MD  02/12/21 0961

## 2021-04-01 ENCOUNTER — TELEPHONE (OUTPATIENT)
Dept: INTERNAL MEDICINE CLINIC | Age: 41
End: 2021-04-01

## 2021-04-01 NOTE — TELEPHONE ENCOUNTER
----- Message from Helen Montez sent at 2021  9:53 AM EDT -----  Subject: Appointment Request    Reason for Call: New Patient Request Appointment    QUESTIONS  Type of Appointment? New Patient/New to Provider  Reason for appointment request? Requested Provider unavailable - Kaylan Urrutia  Additional Information for Provider? Pt wants to reestablish with dr Jude Acevedo she use to see her back in  she wants to speak with    to see if she will take her again.  ---------------------------------------------------------------------------  --------------  [address]  What is the best way for the office to contact you? OK to leave message on   voicemail  Preferred Call Back Phone Number? [phone]  ---------------------------------------------------------------------------  --------------  SCRIPT ANSWERS  Relationship to Patient? Self  Appointment reason? Establish Care/Find a provider  Is this the first appointment to establish care for a ? No  Have you been diagnosed with   tested for   or told that you are suspected of having COVID-19 (Coronavirus)? No  Have you had a fever or taken medication to treat a fever within the past   3 days? No  Have you had a cough   shortness of breath or flu-like symptoms within the past 3 days? No  Do you currently have flu-like symptoms including fever or chills   cough   shortness of breath   or difficulty breathing   or new loss of taste or smell? No  (Service Expert  click yes below to proceed with [employer] As Usual   Scheduling)?  Yes

## 2021-04-01 NOTE — TELEPHONE ENCOUNTER
Spoke with patient and explained that we are unable to to have the patient establish care with Dr Rob Sanchez.

## 2021-04-06 ENCOUNTER — TELEPHONE (OUTPATIENT)
Dept: INTERNAL MEDICINE CLINIC | Age: 41
End: 2021-04-06

## 2021-04-06 NOTE — TELEPHONE ENCOUNTER
----- Message from Des Smith sent at 2021  9:53 AM EDT -----  Subject: Appointment Request    Reason for Call: New Patient Request Appointment    QUESTIONS  Type of Appointment? New Patient/New to Provider  Reason for appointment request? Requested Provider unavailable - Felicitas Rangel  Additional Information for Provider? Pt wants to reestablish with dr Claudette Rily she use to see her back in  she wants to speak with    to see if she will take her again.  ---------------------------------------------------------------------------  --------------  [address]  What is the best way for the office to contact you? OK to leave message on   voicemail  Preferred Call Back Phone Number? [phone]  ---------------------------------------------------------------------------  --------------  SCRIPT ANSWERS  Relationship to Patient? Self  Appointment reason? Establish Care/Find a provider  Is this the first appointment to establish care for a ? No  Have you been diagnosed with   tested for   or told that you are suspected of having COVID-19 (Coronavirus)? No  Have you had a fever or taken medication to treat a fever within the past   3 days? No  Have you had a cough   shortness of breath or flu-like symptoms within the past 3 days? No  Do you currently have flu-like symptoms including fever or chills   cough   shortness of breath   or difficulty breathing   or new loss of taste or smell? No  (Service Expert  click yes below to proceed with [employer] As Usual   Scheduling)?  Yes

## 2021-04-07 ENCOUNTER — TELEPHONE (OUTPATIENT)
Dept: INTERNAL MEDICINE CLINIC | Age: 41
End: 2021-04-07

## 2021-04-07 NOTE — TELEPHONE ENCOUNTER
----- Message from [employer] sent at 2021  9:53 AM EDT -----  Subject: Appointment Request    Reason for Call: New Patient Request Appointment    QUESTIONS  Type of Appointment? New Patient/New to Provider  Reason for appointment request? Requested Provider unavailable - Regina Chen  Additional Information for Provider? Pt wants to reestablish with dr Maria Fernanda Hernandez she use to see her back in  she wants to speak with    to see if she will take her again.  ---------------------------------------------------------------------------  --------------  [address]  What is the best way for the office to contact you? OK to leave message on   voicemail  Preferred Call Back Phone Number? [phone]  ---------------------------------------------------------------------------  --------------  SCRIPT ANSWERS  Relationship to Patient? Self  Appointment reason? Establish Care/Find a provider  Is this the first appointment to establish care for a ? No  Have you been diagnosed with   tested for   or told that you are suspected of having COVID-19 (Coronavirus)? No  Have you had a fever or taken medication to treat a fever within the past   3 days? No  Have you had a cough   shortness of breath or flu-like symptoms within the past 3 days? No  Do you currently have flu-like symptoms including fever or chills   cough   shortness of breath   or difficulty breathing   or new loss of taste or smell? No  (Service Expert  click yes below to proceed with [employer] As Usual   Scheduling)?  Yes Implemented All Universal Safety Interventions:  [place] to call system. Call bell, personal items and telephone within reach. Instruct patient to call for assistance. Room bathroom lighting operational. Non-slip footwear when patient is off stretcher. Physically safe environment: no spills, clutter or unnecessary equipment. Stretcher in lowest position, wheels locked, appropriate side rails in place.

## 2021-05-13 DIAGNOSIS — Z11.59 NEED FOR HEPATITIS C SCREENING TEST: ICD-10-CM

## 2021-05-13 DIAGNOSIS — Z11.4 ENCOUNTER FOR SCREENING FOR HIV: ICD-10-CM

## 2021-05-13 DIAGNOSIS — Z76.89 ENCOUNTER TO ESTABLISH CARE: ICD-10-CM

## 2021-05-13 LAB
A/G RATIO: 2.1 (ref 1.1–2.2)
ALBUMIN SERPL-MCNC: 5.2 G/DL (ref 3.4–5)
ALP BLD-CCNC: 85 U/L (ref 40–129)
ALT SERPL-CCNC: 15 U/L (ref 10–40)
ANION GAP SERPL CALCULATED.3IONS-SCNC: 18 MMOL/L (ref 3–16)
AST SERPL-CCNC: 23 U/L (ref 15–37)
BASOPHILS ABSOLUTE: 0 K/UL (ref 0–0.2)
BASOPHILS RELATIVE PERCENT: 0.3 %
BILIRUB SERPL-MCNC: 0.5 MG/DL (ref 0–1)
BUN BLDV-MCNC: 13 MG/DL (ref 7–20)
CALCIUM SERPL-MCNC: 10.1 MG/DL (ref 8.3–10.6)
CHLORIDE BLD-SCNC: 102 MMOL/L (ref 99–110)
CHOLESTEROL, FASTING: 197 MG/DL (ref 0–199)
CO2: 23 MMOL/L (ref 21–32)
CREAT SERPL-MCNC: 0.6 MG/DL (ref 0.6–1.1)
EOSINOPHILS ABSOLUTE: 0 K/UL (ref 0–0.6)
EOSINOPHILS RELATIVE PERCENT: 0.2 %
GFR AFRICAN AMERICAN: >60
GFR NON-AFRICAN AMERICAN: >60
GLOBULIN: 2.5 G/DL
GLUCOSE BLD-MCNC: 89 MG/DL (ref 70–99)
HCT VFR BLD CALC: 39.4 % (ref 36–48)
HDLC SERPL-MCNC: 87 MG/DL (ref 40–60)
HEMOGLOBIN: 13.2 G/DL (ref 12–16)
HEPATITIS C ANTIBODY INTERPRETATION: NORMAL
LDL CHOLESTEROL CALCULATED: 94 MG/DL
LYMPHOCYTES ABSOLUTE: 1.3 K/UL (ref 1–5.1)
LYMPHOCYTES RELATIVE PERCENT: 36.5 %
MCH RBC QN AUTO: 32.1 PG (ref 26–34)
MCHC RBC AUTO-ENTMCNC: 33.5 G/DL (ref 31–36)
MCV RBC AUTO: 95.7 FL (ref 80–100)
MONOCYTES ABSOLUTE: 0.1 K/UL (ref 0–1.3)
MONOCYTES RELATIVE PERCENT: 3.5 %
NEUTROPHILS ABSOLUTE: 2 K/UL (ref 1.7–7.7)
NEUTROPHILS RELATIVE PERCENT: 59.5 %
PDW BLD-RTO: 12.2 % (ref 12.4–15.4)
PLATELET # BLD: 204 K/UL (ref 135–450)
PMV BLD AUTO: 9 FL (ref 5–10.5)
POTASSIUM SERPL-SCNC: 3.5 MMOL/L (ref 3.5–5.1)
RBC # BLD: 4.12 M/UL (ref 4–5.2)
SODIUM BLD-SCNC: 143 MMOL/L (ref 136–145)
T3 TOTAL: 1.38 NG/ML (ref 0.8–2)
T4 FREE: 1.4 NG/DL (ref 0.9–1.8)
TOTAL PROTEIN: 7.7 G/DL (ref 6.4–8.2)
TRIGLYCERIDE, FASTING: 79 MG/DL (ref 0–150)
TSH REFLEX: 0.17 UIU/ML (ref 0.27–4.2)
VLDLC SERPL CALC-MCNC: 16 MG/DL
WBC # BLD: 3.4 K/UL (ref 4–11)

## 2021-05-14 LAB
HIV AG/AB: NORMAL
HIV ANTIGEN: NORMAL
HIV-1 ANTIBODY: NORMAL
HIV-2 AB: NORMAL

## 2021-06-15 DIAGNOSIS — D64.9 NORMOCYTIC ANEMIA: ICD-10-CM

## 2021-06-15 LAB
FERRITIN: 17.2 NG/ML (ref 15–150)
FOLATE: 8.78 NG/ML (ref 4.78–24.2)
IRON SATURATION: 25 % (ref 15–50)
IRON: 86 UG/DL (ref 37–145)
TOTAL IRON BINDING CAPACITY: 343 UG/DL (ref 260–445)
VITAMIN B-12: 284 PG/ML (ref 211–911)

## 2022-02-18 ENCOUNTER — HOSPITAL ENCOUNTER (INPATIENT)
Age: 42
LOS: 2 days | Discharge: HOME | DRG: 469 | End: 2022-02-20
Attending: EMERGENCY MEDICINE | Admitting: HOSPITALIST
Payer: MEDICAID

## 2022-02-18 ENCOUNTER — APPOINTMENT (OUTPATIENT)
Dept: GENERAL RADIOLOGY | Age: 42
DRG: 469 | End: 2022-02-18
Payer: MEDICAID

## 2022-02-18 ENCOUNTER — APPOINTMENT (OUTPATIENT)
Dept: CT IMAGING | Age: 42
DRG: 469 | End: 2022-02-18
Payer: MEDICAID

## 2022-02-18 DIAGNOSIS — R10.9 ACUTE ABDOMINAL PAIN: ICD-10-CM

## 2022-02-18 DIAGNOSIS — N17.9 ACUTE KIDNEY INJURY (HCC): Primary | ICD-10-CM

## 2022-02-18 DIAGNOSIS — F12.90 MARIJUANA USE: ICD-10-CM

## 2022-02-18 DIAGNOSIS — F12.188 CANNABIS HYPEREMESIS SYNDROME CONCURRENT WITH AND DUE TO CANNABIS ABUSE (HCC): ICD-10-CM

## 2022-02-18 DIAGNOSIS — R11.2 INTRACTABLE VOMITING WITH NAUSEA, UNSPECIFIED VOMITING TYPE: ICD-10-CM

## 2022-02-18 LAB
A/G RATIO: 1.6 (ref 1.1–2.2)
ALBUMIN SERPL-MCNC: 5.9 G/DL (ref 3.4–5)
ALP BLD-CCNC: 139 U/L (ref 40–129)
ALT SERPL-CCNC: 10 U/L (ref 10–40)
ANION GAP SERPL CALCULATED.3IONS-SCNC: 31 MMOL/L (ref 3–16)
AST SERPL-CCNC: 18 U/L (ref 15–37)
BACTERIA: ABNORMAL /HPF
BASE EXCESS VENOUS: -11 MMOL/L (ref -3–3)
BASE EXCESS VENOUS: -5.4 MMOL/L (ref -3–3)
BASOPHILS ABSOLUTE: 0 K/UL (ref 0–0.2)
BASOPHILS RELATIVE PERCENT: 0.4 %
BILIRUB SERPL-MCNC: 0.5 MG/DL (ref 0–1)
BILIRUBIN URINE: ABNORMAL
BLOOD, URINE: ABNORMAL
BUN BLDV-MCNC: 52 MG/DL (ref 7–20)
CALCIUM SERPL-MCNC: 11.2 MG/DL (ref 8.3–10.6)
CHLORIDE BLD-SCNC: 91 MMOL/L (ref 99–110)
CLARITY: CLEAR
CO2: 13 MMOL/L (ref 21–32)
COLOR: YELLOW
CREAT SERPL-MCNC: 7.1 MG/DL (ref 0.6–1.1)
EOSINOPHILS ABSOLUTE: 0 K/UL (ref 0–0.6)
EOSINOPHILS RELATIVE PERCENT: 0.1 %
EPITHELIAL CELLS, UA: ABNORMAL /HPF (ref 0–5)
GFR AFRICAN AMERICAN: 8
GFR NON-AFRICAN AMERICAN: 6
GLUCOSE BLD-MCNC: 138 MG/DL (ref 70–99)
GLUCOSE URINE: NEGATIVE MG/DL
HCG QUALITATIVE: NEGATIVE
HCO3 VENOUS: 12.4 MMOL/L (ref 23–29)
HCO3 VENOUS: 19.9 MMOL/L (ref 23–29)
HCT VFR BLD CALC: 42.4 % (ref 36–48)
HEMOGLOBIN: 14.5 G/DL (ref 12–16)
HYALINE CASTS: ABNORMAL /LPF (ref 0–2)
KETONES, URINE: NEGATIVE MG/DL
LACTIC ACID, SEPSIS: 1.1 MMOL/L (ref 0.4–1.9)
LEUKOCYTE ESTERASE, URINE: NEGATIVE
LIPASE: 25 U/L (ref 13–60)
LYMPHOCYTES ABSOLUTE: 1.4 K/UL (ref 1–5.1)
LYMPHOCYTES RELATIVE PERCENT: 14.2 %
MCH RBC QN AUTO: 32 PG (ref 26–34)
MCHC RBC AUTO-ENTMCNC: 34.3 G/DL (ref 31–36)
MCV RBC AUTO: 93.4 FL (ref 80–100)
MICROSCOPIC EXAMINATION: YES
MONOCYTES ABSOLUTE: 0.7 K/UL (ref 0–1.3)
MONOCYTES RELATIVE PERCENT: 7.5 %
NEUTROPHILS ABSOLUTE: 7.8 K/UL (ref 1.7–7.7)
NEUTROPHILS RELATIVE PERCENT: 77.8 %
NITRITE, URINE: NEGATIVE
O2 SAT, VEN: 100 %
O2 SAT, VEN: 61 %
O2 THERAPY: ABNORMAL
O2 THERAPY: ABNORMAL
PCO2, VEN: 16.5 MMHG (ref 40–50)
PCO2, VEN: 34.4 MMHG (ref 40–50)
PDW BLD-RTO: 13.7 % (ref 12.4–15.4)
PH UA: 5.5 (ref 5–8)
PH VENOUS: 7.37 (ref 7.35–7.45)
PH VENOUS: 7.48 (ref 7.35–7.45)
PLATELET # BLD: 306 K/UL (ref 135–450)
PMV BLD AUTO: 8.1 FL (ref 5–10.5)
PO2, VEN: 191.4 MMHG (ref 25–40)
PO2, VEN: 32.5 MMHG (ref 25–40)
POTASSIUM REFLEX MAGNESIUM: 3.6 MMOL/L (ref 3.5–5.1)
PROTEIN UA: 100 MG/DL
RBC # BLD: 4.54 M/UL (ref 4–5.2)
RBC UA: ABNORMAL /HPF (ref 0–4)
RENAL EPITHELIAL, UA: ABNORMAL /HPF (ref 0–1)
SODIUM BLD-SCNC: 135 MMOL/L (ref 136–145)
SPECIFIC GRAVITY UA: >=1.03 (ref 1–1.03)
TCO2 CALC VENOUS: 13 MMOL/L
TCO2 CALC VENOUS: 21 MMOL/L
TOTAL PROTEIN: 9.7 G/DL (ref 6.4–8.2)
URINE REFLEX TO CULTURE: ABNORMAL
URINE TYPE: ABNORMAL
UROBILINOGEN, URINE: 0.2 E.U./DL
WBC # BLD: 10.1 K/UL (ref 4–11)
WBC UA: ABNORMAL /HPF (ref 0–5)

## 2022-02-18 PROCEDURE — 87040 BLOOD CULTURE FOR BACTERIA: CPT

## 2022-02-18 PROCEDURE — 83605 ASSAY OF LACTIC ACID: CPT

## 2022-02-18 PROCEDURE — 85025 COMPLETE CBC W/AUTO DIFF WBC: CPT

## 2022-02-18 PROCEDURE — 2060000000 HC ICU INTERMEDIATE R&B

## 2022-02-18 PROCEDURE — 2580000003 HC RX 258: Performed by: EMERGENCY MEDICINE

## 2022-02-18 PROCEDURE — 99283 EMERGENCY DEPT VISIT LOW MDM: CPT

## 2022-02-18 PROCEDURE — 80053 COMPREHEN METABOLIC PANEL: CPT

## 2022-02-18 PROCEDURE — 36415 COLL VENOUS BLD VENIPUNCTURE: CPT

## 2022-02-18 PROCEDURE — 81001 URINALYSIS AUTO W/SCOPE: CPT

## 2022-02-18 PROCEDURE — 6360000002 HC RX W HCPCS: Performed by: EMERGENCY MEDICINE

## 2022-02-18 PROCEDURE — 93005 ELECTROCARDIOGRAM TRACING: CPT | Performed by: EMERGENCY MEDICINE

## 2022-02-18 PROCEDURE — 83690 ASSAY OF LIPASE: CPT

## 2022-02-18 PROCEDURE — 84703 CHORIONIC GONADOTROPIN ASSAY: CPT

## 2022-02-18 PROCEDURE — 2500000003 HC RX 250 WO HCPCS: Performed by: EMERGENCY MEDICINE

## 2022-02-18 PROCEDURE — 96375 TX/PRO/DX INJ NEW DRUG ADDON: CPT

## 2022-02-18 PROCEDURE — 96365 THER/PROPH/DIAG IV INF INIT: CPT

## 2022-02-18 PROCEDURE — 82803 BLOOD GASES ANY COMBINATION: CPT

## 2022-02-18 PROCEDURE — 6370000000 HC RX 637 (ALT 250 FOR IP): Performed by: EMERGENCY MEDICINE

## 2022-02-18 PROCEDURE — 74176 CT ABD & PELVIS W/O CONTRAST: CPT

## 2022-02-18 PROCEDURE — 71045 X-RAY EXAM CHEST 1 VIEW: CPT

## 2022-02-18 RX ORDER — 0.9 % SODIUM CHLORIDE 0.9 %
1000 INTRAVENOUS SOLUTION INTRAVENOUS ONCE
Status: COMPLETED | OUTPATIENT
Start: 2022-02-18 | End: 2022-02-18

## 2022-02-18 RX ORDER — ONDANSETRON 4 MG/1
4 TABLET, ORALLY DISINTEGRATING ORAL EVERY 8 HOURS PRN
Status: DISCONTINUED | OUTPATIENT
Start: 2022-02-18 | End: 2022-02-20 | Stop reason: HOSPADM

## 2022-02-18 RX ORDER — ACETAMINOPHEN 650 MG/1
650 SUPPOSITORY RECTAL EVERY 6 HOURS PRN
Status: DISCONTINUED | OUTPATIENT
Start: 2022-02-18 | End: 2022-02-20 | Stop reason: HOSPADM

## 2022-02-18 RX ORDER — SODIUM CHLORIDE 0.9 % (FLUSH) 0.9 %
5-40 SYRINGE (ML) INJECTION PRN
Status: DISCONTINUED | OUTPATIENT
Start: 2022-02-18 | End: 2022-02-20 | Stop reason: HOSPADM

## 2022-02-18 RX ORDER — LORAZEPAM 0.5 MG/1
0.5 TABLET ORAL ONCE
Status: COMPLETED | OUTPATIENT
Start: 2022-02-18 | End: 2022-02-18

## 2022-02-18 RX ORDER — HEPARIN SODIUM 5000 [USP'U]/ML
5000 INJECTION, SOLUTION INTRAVENOUS; SUBCUTANEOUS EVERY 8 HOURS SCHEDULED
Status: DISCONTINUED | OUTPATIENT
Start: 2022-02-18 | End: 2022-02-20 | Stop reason: HOSPADM

## 2022-02-18 RX ORDER — SODIUM CHLORIDE 0.9 % (FLUSH) 0.9 %
5-40 SYRINGE (ML) INJECTION EVERY 12 HOURS SCHEDULED
Status: DISCONTINUED | OUTPATIENT
Start: 2022-02-18 | End: 2022-02-20 | Stop reason: HOSPADM

## 2022-02-18 RX ORDER — DIPHENHYDRAMINE HYDROCHLORIDE 50 MG/ML
25 INJECTION INTRAMUSCULAR; INTRAVENOUS ONCE
Status: COMPLETED | OUTPATIENT
Start: 2022-02-18 | End: 2022-02-18

## 2022-02-18 RX ORDER — ONDANSETRON 4 MG/1
1 TABLET, ORALLY DISINTEGRATING ORAL PRN
COMMUNITY

## 2022-02-18 RX ORDER — ACETAMINOPHEN 325 MG/1
650 TABLET ORAL EVERY 6 HOURS PRN
Status: DISCONTINUED | OUTPATIENT
Start: 2022-02-18 | End: 2022-02-20 | Stop reason: HOSPADM

## 2022-02-18 RX ORDER — SODIUM CHLORIDE 9 MG/ML
1000 INJECTION, SOLUTION INTRAVENOUS CONTINUOUS
Status: DISCONTINUED | OUTPATIENT
Start: 2022-02-18 | End: 2022-02-19

## 2022-02-18 RX ORDER — METOCLOPRAMIDE HYDROCHLORIDE 5 MG/ML
5 INJECTION INTRAMUSCULAR; INTRAVENOUS ONCE
Status: COMPLETED | OUTPATIENT
Start: 2022-02-18 | End: 2022-02-18

## 2022-02-18 RX ORDER — ONDANSETRON 2 MG/ML
4 INJECTION INTRAMUSCULAR; INTRAVENOUS EVERY 6 HOURS PRN
Status: DISCONTINUED | OUTPATIENT
Start: 2022-02-18 | End: 2022-02-20 | Stop reason: HOSPADM

## 2022-02-18 RX ORDER — SODIUM CHLORIDE, SODIUM LACTATE, POTASSIUM CHLORIDE, CALCIUM CHLORIDE 600; 310; 30; 20 MG/100ML; MG/100ML; MG/100ML; MG/100ML
INJECTION, SOLUTION INTRAVENOUS CONTINUOUS
Status: DISCONTINUED | OUTPATIENT
Start: 2022-02-18 | End: 2022-02-19

## 2022-02-18 RX ORDER — SODIUM CHLORIDE 9 MG/ML
25 INJECTION, SOLUTION INTRAVENOUS PRN
Status: DISCONTINUED | OUTPATIENT
Start: 2022-02-18 | End: 2022-02-20 | Stop reason: HOSPADM

## 2022-02-18 RX ADMIN — METOCLOPRAMIDE HYDROCHLORIDE 5 MG: 5 INJECTION INTRAMUSCULAR; INTRAVENOUS at 17:06

## 2022-02-18 RX ADMIN — SODIUM CHLORIDE 1000 ML: 9 INJECTION, SOLUTION INTRAVENOUS at 16:53

## 2022-02-18 RX ADMIN — SODIUM CHLORIDE 1000 ML: 9 INJECTION, SOLUTION INTRAVENOUS at 19:30

## 2022-02-18 RX ADMIN — DIPHENHYDRAMINE HYDROCHLORIDE 25 MG: 50 INJECTION, SOLUTION INTRAMUSCULAR; INTRAVENOUS at 17:04

## 2022-02-18 RX ADMIN — SODIUM CHLORIDE 1000 ML: 9 INJECTION, SOLUTION INTRAVENOUS at 18:09

## 2022-02-18 RX ADMIN — CEFEPIME HYDROCHLORIDE 2000 MG: 1 INJECTION, POWDER, FOR SOLUTION INTRAMUSCULAR; INTRAVENOUS at 21:11

## 2022-02-18 RX ADMIN — LORAZEPAM 0.5 MG: 0.5 TABLET ORAL at 18:48

## 2022-02-18 RX ADMIN — FAMOTIDINE 20 MG: 10 INJECTION, SOLUTION INTRAVENOUS at 17:01

## 2022-02-18 ASSESSMENT — PAIN SCALES - GENERAL: PAINLEVEL_OUTOF10: 0

## 2022-02-18 NOTE — ED PROVIDER NOTES
[address] [place] [address] ENCOUNTER      Pt Name: Lit Hair  MRN: [id number]  [place] 1980  Date of evaluation: 2/18/2022  Provider: Vitaliy Serna [address] [place] [address]       Chief Complaint   Patient presents with    Dizziness     intermittent N/V since 2/15.   smokes marijuana daily. pt states that she went to ED yesterday for the same. no vomiting today. \"I feel dehydrated. \"  reports feeling dizzy. no abd pain right now.   mild nausea and intermittent dry heaves here in ED    Emesis         HISTORY OF PRESENT ILLNESS   (Location/Symptom, Timing/Onset, Context/Setting, Quality, Duration, Modifying Factors, Severity)  Note limiting factors. Lit Hair is a 38 y.o. female who presents to the emergency department with a complaint of nausea vomiting that began on Tuesday, 3 days ago. She reports multiple episodes of vomiting with nausea over the last several days. She last vomited this morning upon waking. She has been able to hold down a few sips of Gatorade but no solid food in the last 3 days. She reports that on Tuesday she had some slightly loose stool but has not had any minda diarrhea. No further loose stool since that time. She denies any fever or chills. No exposure to illness. She denies any chest pain or shortness of breath. She denies any cough or cold symptoms. She denies any melena or hematochezia. She states that after vomiting last night forcefully several times during the day she vomited a small tiny amount of some bright red bloody streaks in the emesis. Most of her episodes of vomiting since that time have just been dry heaves. No further episodes. No back pain. No neck or jaw pain. No sore throat. No epistaxis. She does not take any anticoagulants. She denies any use of NSAIDs. She denies any alcohol use. She does smoke marijuana on a daily basis. Medical history is significant for cyclic vomiting syndrome. She reports similar symptoms in the past.  She suspects that she is dehydrated. She does report some mild pain in the epigastric area described as sharp and stabbing. She is not pregnant. She denies any history of diabetes. She denies any trauma or injury. No syncope or palpitations. Nursing Notes were reviewed. HPI        REVIEW OF SYSTEMS    (2-9 systems for level 4, 10 or more for level 5)       Constitutional: Negative for fever or chills. HENT: Negative for rhinorrhea and sore throat. Eyes: Negative for redness or drainage. Respiratory: Negative for shortness of breath or dyspnea on exertion. Cardiovascular: Negative for chest pain. Genitourinary: Negative for flank pain. Negative for dysuria. Negative for hematuria. Neurological: Negative for headache. All systems are reviewed and are negative except for those listed above in the history of present illness and ROS. PAST MEDICAL HISTORY     Past Medical History:   Diagnosis Date    Anemia     Cyclical vomiting     Diverticulitis 12/2020    with chronic pancolitis- Dr. Shani Crowell History of alcohol abuse     h/o 1/2 pint/day    Marijuana use, continuous     Scabies     Tobacco dependence          SURGICAL HISTORY       Past Surgical History:   Procedure Laterality Date    TYMPANOSTOMY TUBE PLACEMENT           CURRENT MEDICATIONS       Previous Medications    MAGNESIUM-OXIDE 400 (241.3 MG) MG TABS TABLET    Take 1 tablet by mouth daily    ONDANSETRON (ZOFRAN ODT PO)    Take 1 tablet by mouth as needed    POTASSIUM CHLORIDE (KLOR-CON M) 20 MEQ EXTENDED RELEASE TABLET    Take 1 tablet by mouth daily as needed (vomiting)    PROMETHAZINE (PHENERGAN) 25 MG TABLET    Take 1 tablet by mouth every 8 hours as needed for Nausea WARNING:  May cause drowsiness. May impair ability to operate vehicles or machinery. Do not use in combination with alcohol. ALLERGIES     Patient has no known allergies.     FAMILY HISTORY       Family History   Problem Relation Age of Onset    High Blood Pressure Mother     Pancreatic Cancer Mother 58    Diabetes Maternal Grandmother     Stroke Maternal Grandmother           SOCIAL HISTORY       Social History     Socioeconomic History    Marital status: Single     Spouse name: None    Number of children: None    Years of education: None    Highest education level: None   Occupational History    None   Tobacco Use    Smoking status: Current Every Day Smoker     Packs/day: 0.50     Start date: 1999    Smokeless tobacco: Current User   Substance and Sexual Activity    Alcohol use: Yes     Comment: 1-2 weekly    Drug use: Yes     Types: Marijuana (Weed)     Comment: twice daily    Sexual activity: Yes   Other Topics Concern    None   Social History Narrative    None     Social Determinants of Health     Financial Resource Strain:     Difficulty of Paying Living Expenses: Not on file   Food Insecurity:     Worried About Running Out of Food in the Last Year: Not on file    Nia of Food in the Last Year: Not on file   Transportation Needs:     Lack of Transportation (Medical): Not on file    Lack of Transportation (Non-Medical):  Not on file   Physical Activity:     Days of Exercise per Week: Not on file    Minutes of Exercise per Session: Not on file   Stress:     Feeling of Stress : Not on file   Social Connections:     Frequency of Communication with Friends and Family: Not on file    Frequency of Social Gatherings with Friends and Family: Not on file    Attends Orthodoxy Services: Not on file    Active Member of Clubs or Organizations: Not on file    Attends Club or Organization Meetings: Not on file    Marital Status: Not on file   Intimate Partner Violence:     Fear of Current or Ex-Partner: Not on file    Emotionally Abused: Not on file    Physically Abused: Not on file    Sexually Abused: Not on file   Housing Stability:     Unable to Pay for Housing in the Last Year: Not on file    Number of Places Lived in the Last Year: Not on file    Unstable Housing in the Last Year: Not on file       SCREENINGS             PHYSICAL EXAM    (up to 7 for level 4, 8 or more for level 5)     ED Triage Vitals [02/18/22 1552]   BP Temp Temp Source Pulse Resp SpO2 Height Weight   127/87 97.7 °F (36.5 °C) Oral 127 17 100 % 5' 5\" (1.651 m) 99 lb (44.9 kg)         Physical Exam   Constitutional: Awake and alert. Nontoxic. Nauseated. Mild discomfort. Head: No visible evidence of trauma. Normocephalic. Eyes: Pupils equal and reactive. No photophobia. Conjunctiva normal.    HENT: Oral mucosa moist.  Airway patent. Pharynx without erythema. Nares were clear. Neck:  Soft and supple. Nontender. Heart:  Regular rate and rhythm. No murmur. Lungs:  Clear to auscultation. No wheezes, rales, or ronchi. No conversational dyspnea or accessory muscle use. Chest: Chest wall non-tender. No evidence of trauma. Abdomen:  Soft, nondistended, bowel sounds present. Mild to moderate tenderness in the mid abdomen. Abdomen is scaphoid with excessive skin indicating recent weight loss. No guarding rigidity or rebound. No masses. Musculoskeletal: Extremities non-tender with full range of motion. Radial and dorsalis pedis pulses were intact. No calf tenderness erythema or edema. Neurological: Alert and oriented x 3. Speech clear. Cranial nerves II-XII intact. No facial droop. No acute focal motor or sensory deficits. Skin: Skin is warm and dry. No rash. Lymphatic:  No lympadenopathy. Psychiatric: Normal mood and affect. Behavior is normal.         DIAGNOSTIC RESULTS     EKG: All EKG's are interpreted by the Emergency Department Physician who either signs or Co-signs this chart in the absence of a cardiologist.    Normal sinus rhythm. Rate 93. ME interval 174 ms. QRS duration 72 ms. QTc 474 ms. R axis 62 degrees. No ST elevation. No acute change.     RADIOLOGY: Non-plain film images such as CT, Ultrasound and MRI are read by the radiologist. Plain radiographic images are visualized and preliminarily interpreted by the emergency physician with the below findings:        Interpretation per the Radiologist below, if available at the time of this note:    CT ABDOMEN PELVIS WO CONTRAST Additional Contrast? None   Final Result   Mild hydrops of the gallbladder with no gallstones or wall thickening. Suggest ultrasound follow-up, if indicated. Submucosal fat infiltration throughout the colon suggestive of chronic   pancolitis which is grossly unchanged with no bowel obstruction. Mild chronic liver changes which is unchanged      No hydronephrosis or urinary obstruction      Moderate diverticula scattered throughout the colon in the pelvis with no   pericolonic inflammation. Grossly unremarkable uterus with no pelvic mass         XR CHEST PORTABLE   Final Result   No acute cardiopulmonary abnormality identified.                ED BEDSIDE ULTRASOUND:   Performed by ED Physician - none    LABS:  Labs Reviewed   CBC WITH AUTO DIFFERENTIAL - Abnormal; Notable for the following components:       Result Value    Neutrophils Absolute 7.8 (*)     All other components within normal limits    Narrative:     Performed at:  [facility]  [address] [facility]   Phone [phone]   COMPREHENSIVE METABOLIC PANEL W/ REFLEX TO MG FOR LOW K - Abnormal; Notable for the following components:    Sodium 135 (*)     Chloride 91 (*)     CO2 13 (*)     Anion Gap 31 (*)     Glucose 138 (*)     BUN 52 (*)     CREATININE 7.1 (*)     GFR Non- 6 (*)     GFR  8 (*)     Calcium 11.2 (*)     Total Protein 9.7 (*)     Albumin 5.9 (*)     Alkaline Phosphatase 139 (*)     All other components within normal limits    Narrative:     Fabby SERRANO tel. [id number],  Chemistry results called to and read back by ty Schafer, 02/18/2022  17:34, by [facility]  Performed at:  [facility]  [address] [place], [facility]   Phone [phone]   CULTURE, BLOOD 1   CULTURE, BLOOD 2   LIPASE    Narrative:     Alona Orona  [facility] [phone],  Chemistry results called to and read back by ty Schafer, 02/18/2022  17:34, by [facility]  Performed at:  [address] [facility]  [address] [place], [facility]   Phone [phone]   HCG, SERUM, QUALITATIVE    Narrative:     Performed at:  [address] [facility]  [address] [place], [facility]   Phone [phone]   URINALYSIS WITH REFLEX TO CULTURE   LACTATE, SEPSIS   LACTATE, SEPSIS   BLOOD GAS, VENOUS       All other labs were within normal range or not returned as of this dictation. EMERGENCY DEPARTMENT COURSE and DIFFERENTIAL DIAGNOSIS/MDM:   Vitals:    Vitals:    02/18/22 1700 02/18/22 1715 02/18/22 1730 02/18/22 1745   BP: (!) 142/91 123/80 (!) 135/119 (!) 131/97   Pulse: 113 128 130 119   Resp: 24 24 20 22   Temp:       TempSrc:       SpO2: 100% 100% 100% 100%   Weight:       Height:             MDM      The patient presents with nausea and vomiting since Tuesday, 3 days ago with multiple episodes. She suspects that she is dehydrated. There is also been some epigastric abdominal pain. She was noted to have tenderness in the mid abdomen just above the umbilicus in the midline. She rates her pain a 2/10 intensity. She is nauseated. She was given Reglan 5 mg IV, Benadryl 25 mg IV, and Pepcid 20 mg IV. She was given normal saline 1 L bolus. Initial heart rate was 127. She does appear volume depleted. However, oral mucosa is moist.  She does report decreased urine output over the last 24 hours. CT abdomen and pelvis with IV contrast was obtained.     Differential diagnosis would include gastritis, peptic ulcer disease, colitis, pancreatitis, cyclic vomiting syndrome, cannabis hyperemesis syndrome. REASSESSMENT          5:40 PM: White blood cell count is normal.  Hemoglobin is 14.5. Glucose 138. BUN 52. Creatinine 7.1. Bicarb 13. Potassium 3.6. The patient has evidence of acute kidney injury. Potassium is normal at 3.6. Lipase is normal.  She denies any history of prior kidney disease. CT abdomen and pelvis was changed to CT abdomen pelvis without contrast.  She was started on normal saline at 125 mL/h. X-ray does not reveal any evidence of volume overload. No JVD. No peripheral edema. I suspect that her acute kidney injury is secondary to volume depletion. There is that the patient went to [facility] yesterday on February 17. At that time her creatinine was 5 and she received lactated Ringer's in the emergency department. She was admitted but left AGAINST MEDICAL ADVICE. Did not reveal this information at the time of initial exam.    Prior records were reviewed. On May 13, 2021 the patient had a normal creatinine of 0.6 with a GFR greater than 60. On care everywhere, at [facility] on December 8, 2021 the patient's BUN was 6 with a creatinine of 0.68. Apparently she had a admission in early December at [facility] for similar situation. She left AMA prior to receiving additional work-up. She states she has never required dialysis in the past.    6:15 PM: I did review her CT abdomen and pelvis without contrast.  Did not see any evidence of hydronephrosis or ureteral stone. Official report is pending. There was some mild bladder wall thickening. However, bladder is mostly collapsed. She has not had any urine output in the emergency department. She remains tachycardic with a heart rate of 120. Blood cultures were added as well as lactate and VBG. She was given a dose of cefepime to cover the possibility of occult sepsis. However, clinical suspicion for sepsis is very low.   I suspect that her clinical presentation with acute kidney injury is secondary to significant volume depletion from prolonged intractable vomiting and poor oral intake. She has had a 10 pound weight loss over the last 1 to 2 weeks. 6:39 PM: CT abdomen pelvis reveals no acute findings. No hydronephrosis. Ureteral stone. She will be admitted for further treatment and evaluation. A call was placed to the hospitalist on-call for admission. At the time of change of shift at 7:07 PM, final disposition is pending acceptance by the hospitalist at [address] will be transferred to the [facility] physician for discussion with the hospitalist.    [address] [facility]   Total Critical Care time was 0 minutes, excluding separately reportable procedures. There was a high probability of clinically significant/life threatening deterioration in the patient's condition which required my urgent intervention. CONSULTS:  None    PROCEDURES:  Unless otherwise noted below, none     Procedures        FINAL IMPRESSION      1. Acute kidney injury ([address]    2. Acute abdominal pain    3. Intractable vomiting with nausea, unspecified vomiting type    4. Marijuana use          DISPOSITION/PLAN   DISPOSITION  02/18/2022 06:18:07 PM      PATIENT REFERRED TO:  No follow-up provider specified. DISCHARGE MEDICATIONS:  New Prescriptions    No medications on file     Controlled Substances Monitoring:     No flowsheet data found. (Please note that portions of this note were completed with a voice recognition program.  Efforts were made to edit the dictations but occasionally words are mis-transcribed. )    [id number] Randall Najera DO (electronically signed)  Attending Emergency Physician          Ivan Lou DO  02/18/22 8021

## 2022-02-18 NOTE — ED NOTES
Pt states her boyfriend is going to pick her up.  meds ordered and explained. Placing IV and drawing labs. Pt wants to go to sleep. Sinus tach on monitor. Already talked with pt about importance of stopping using marijuana to help stop the chronic vomiting.      Kayla Montoya RN  02/18/22 1758

## 2022-02-18 NOTE — ED NOTES
Encouraged pt to try to give urine spec. Up to bathroom with lots of encouragement. Unable to void.         Hamzah Andrews RN  02/18/22 2947

## 2022-02-18 NOTE — ED NOTES
[id number]  intermittent N/V since 2/15.   smokes marijuana daily. pt states that she went to ED yesterday for the same. no vomiting today. \"I feel dehydrated. \"  reports feeling dizzy. no abd pain right now.   mild nausea and intermittent dry heaves here in ED    Very needy. Talks softly and slowly. Moves around slowly. Very slow to answer questions.    LMP 2/14/22     Hamzah Andrews RN  02/18/22 [address], RN  02/18/22 4993

## 2022-02-19 LAB
A/G RATIO: 1.5 (ref 1.1–2.2)
ALBUMIN SERPL-MCNC: 4.6 G/DL (ref 3.4–5)
ALP BLD-CCNC: 98 U/L (ref 40–129)
ALT SERPL-CCNC: 8 U/L (ref 10–40)
ANION GAP SERPL CALCULATED.3IONS-SCNC: 21 MMOL/L (ref 3–16)
APTT: 32.2 SEC (ref 26.2–38.6)
AST SERPL-CCNC: 14 U/L (ref 15–37)
BASOPHILS ABSOLUTE: 0 K/UL (ref 0–0.2)
BASOPHILS RELATIVE PERCENT: 0.2 %
BILIRUB SERPL-MCNC: 0.7 MG/DL (ref 0–1)
BUN BLDV-MCNC: 37 MG/DL (ref 7–20)
CALCIUM SERPL-MCNC: 8.8 MG/DL (ref 8.3–10.6)
CHLORIDE BLD-SCNC: 104 MMOL/L (ref 99–110)
CO2: 17 MMOL/L (ref 21–32)
CREAT SERPL-MCNC: 2.4 MG/DL (ref 0.6–1.1)
CREATININE URINE: 176 MG/DL (ref 28–259)
EKG ATRIAL RATE: 93 BPM
EKG DIAGNOSIS: NORMAL
EKG P AXIS: 76 DEGREES
EKG P-R INTERVAL: 174 MS
EKG Q-T INTERVAL: 382 MS
EKG QRS DURATION: 72 MS
EKG QTC CALCULATION (BAZETT): 474 MS
EKG R AXIS: 62 DEGREES
EKG T AXIS: 49 DEGREES
EKG VENTRICULAR RATE: 93 BPM
EOSINOPHILS ABSOLUTE: 0 K/UL (ref 0–0.6)
EOSINOPHILS RELATIVE PERCENT: 0 %
GFR AFRICAN AMERICAN: 27
GFR NON-AFRICAN AMERICAN: 22
GLUCOSE BLD-MCNC: 134 MG/DL (ref 70–99)
HCT VFR BLD CALC: 35.3 % (ref 36–48)
HEMOGLOBIN: 11.8 G/DL (ref 12–16)
INR BLD: 0.99 (ref 0.88–1.12)
LYMPHOCYTES ABSOLUTE: 0.8 K/UL (ref 1–5.1)
LYMPHOCYTES RELATIVE PERCENT: 11.5 %
MAGNESIUM: 2.5 MG/DL (ref 1.8–2.4)
MCH RBC QN AUTO: 31.4 PG (ref 26–34)
MCHC RBC AUTO-ENTMCNC: 33.3 G/DL (ref 31–36)
MCV RBC AUTO: 94 FL (ref 80–100)
MONOCYTES ABSOLUTE: 0.6 K/UL (ref 0–1.3)
MONOCYTES RELATIVE PERCENT: 8.4 %
NEUTROPHILS ABSOLUTE: 5.5 K/UL (ref 1.7–7.7)
NEUTROPHILS RELATIVE PERCENT: 79.9 %
OSMOLALITY URINE: 595 MOSM/KG (ref 390–1070)
PDW BLD-RTO: 13.7 % (ref 12.4–15.4)
PLATELET # BLD: 238 K/UL (ref 135–450)
PMV BLD AUTO: 8 FL (ref 5–10.5)
POTASSIUM REFLEX MAGNESIUM: 3 MMOL/L (ref 3.5–5.1)
PROTHROMBIN TIME: 11.2 SEC (ref 9.9–12.7)
RBC # BLD: 3.76 M/UL (ref 4–5.2)
SODIUM BLD-SCNC: 142 MMOL/L (ref 136–145)
SODIUM URINE: 56 MMOL/L
T3 TOTAL: 1.22 NG/ML (ref 0.8–2)
T4 FREE: 1.4 NG/DL (ref 0.9–1.8)
TOTAL CK: 175 U/L (ref 26–192)
TOTAL PROTEIN: 7.7 G/DL (ref 6.4–8.2)
TSH REFLEX: 0.2 UIU/ML (ref 0.27–4.2)
URIC ACID, SERUM: 10.9 MG/DL (ref 2.6–6)
WBC # BLD: 6.9 K/UL (ref 4–11)

## 2022-02-19 PROCEDURE — 36415 COLL VENOUS BLD VENIPUNCTURE: CPT

## 2022-02-19 PROCEDURE — 6360000002 HC RX W HCPCS: Performed by: NURSE PRACTITIONER

## 2022-02-19 PROCEDURE — 2500000003 HC RX 250 WO HCPCS: Performed by: NURSE PRACTITIONER

## 2022-02-19 PROCEDURE — 85730 THROMBOPLASTIN TIME PARTIAL: CPT

## 2022-02-19 PROCEDURE — 93010 ELECTROCARDIOGRAM REPORT: CPT | Performed by: INTERNAL MEDICINE

## 2022-02-19 PROCEDURE — 80053 COMPREHEN METABOLIC PANEL: CPT

## 2022-02-19 PROCEDURE — 6370000000 HC RX 637 (ALT 250 FOR IP): Performed by: FAMILY MEDICINE

## 2022-02-19 PROCEDURE — 84439 ASSAY OF FREE THYROXINE: CPT

## 2022-02-19 PROCEDURE — 83935 ASSAY OF URINE OSMOLALITY: CPT

## 2022-02-19 PROCEDURE — 82570 ASSAY OF URINE CREATININE: CPT

## 2022-02-19 PROCEDURE — 82550 ASSAY OF CK (CPK): CPT

## 2022-02-19 PROCEDURE — 6360000002 HC RX W HCPCS: Performed by: FAMILY MEDICINE

## 2022-02-19 PROCEDURE — 2060000000 HC ICU INTERMEDIATE R&B

## 2022-02-19 PROCEDURE — 84443 ASSAY THYROID STIM HORMONE: CPT

## 2022-02-19 PROCEDURE — 84300 ASSAY OF URINE SODIUM: CPT

## 2022-02-19 PROCEDURE — 84550 ASSAY OF BLOOD/URIC ACID: CPT

## 2022-02-19 PROCEDURE — 2580000003 HC RX 258: Performed by: NURSE PRACTITIONER

## 2022-02-19 PROCEDURE — 83735 ASSAY OF MAGNESIUM: CPT

## 2022-02-19 PROCEDURE — 85025 COMPLETE CBC W/AUTO DIFF WBC: CPT

## 2022-02-19 PROCEDURE — 84480 ASSAY TRIIODOTHYRONINE (T3): CPT

## 2022-02-19 PROCEDURE — 85610 PROTHROMBIN TIME: CPT

## 2022-02-19 RX ORDER — PROCHLORPERAZINE EDISYLATE 5 MG/ML
10 INJECTION INTRAMUSCULAR; INTRAVENOUS EVERY 6 HOURS PRN
Status: DISCONTINUED | OUTPATIENT
Start: 2022-02-19 | End: 2022-02-20 | Stop reason: HOSPADM

## 2022-02-19 RX ORDER — LORAZEPAM 2 MG/ML
0.5 INJECTION INTRAMUSCULAR EVERY 6 HOURS PRN
Status: DISCONTINUED | OUTPATIENT
Start: 2022-02-19 | End: 2022-02-19

## 2022-02-19 RX ADMIN — SODIUM BICARBONATE: 84 INJECTION, SOLUTION INTRAVENOUS at 23:53

## 2022-02-19 RX ADMIN — HEPARIN SODIUM 5000 UNITS: 5000 INJECTION INTRAVENOUS; SUBCUTANEOUS at 05:01

## 2022-02-19 RX ADMIN — HEPARIN SODIUM 5000 UNITS: 5000 INJECTION INTRAVENOUS; SUBCUTANEOUS at 14:12

## 2022-02-19 RX ADMIN — HEPARIN SODIUM 5000 UNITS: 5000 INJECTION INTRAVENOUS; SUBCUTANEOUS at 01:23

## 2022-02-19 RX ADMIN — HEPARIN SODIUM 5000 UNITS: 5000 INJECTION INTRAVENOUS; SUBCUTANEOUS at 21:29

## 2022-02-19 RX ADMIN — PROCHLORPERAZINE EDISYLATE 10 MG: 5 INJECTION INTRAMUSCULAR; INTRAVENOUS at 09:11

## 2022-02-19 RX ADMIN — POTASSIUM BICARBONATE 40 MEQ: 782 TABLET, EFFERVESCENT ORAL at 12:18

## 2022-02-19 RX ADMIN — LORAZEPAM 0.5 MG: 2 INJECTION INTRAMUSCULAR; INTRAVENOUS at 03:40

## 2022-02-19 RX ADMIN — SODIUM BICARBONATE: 84 INJECTION, SOLUTION INTRAVENOUS at 09:11

## 2022-02-19 RX ADMIN — ONDANSETRON 4 MG: 2 INJECTION INTRAMUSCULAR; INTRAVENOUS at 01:23

## 2022-02-19 ASSESSMENT — PAIN SCALES - GENERAL: PAINLEVEL_OUTOF10: 0

## 2022-02-19 NOTE — ED NOTES
[id number] report to [facility] ambulance crew. Belongings with pt. No pain. Sinus rhythm to sinus tach on monitor. IV sites x 2 clear.   IV of NS infusing at 125cc/h     Alberto Hare RN  02/18/22 5675

## 2022-02-19 NOTE — ED NOTES
Pt got up to bathroom again to void. Eating ice frequently.    No abd pain     Annabelle Barros RN  02/18/22 1943

## 2022-02-19 NOTE — ED NOTES
Called access center due to no bed assignment yet  (pt accepted by hospitalist at [address] [place] [address]). Nursing supervisor now assigned room 4106. Phone number for report is [phone]. Transportation needs: cardiac monitor and IV fluids NS at 125cc/h    Dr Milton Grace updated about only having one set of blood cultures drawn. Dr Marlo Mead states ok to not draw 2nd set of blood cultures at this time. Ok to give cefepime.      Heather Gandhi RN  02/18/22 2142

## 2022-02-19 NOTE — ED NOTES
[id number] trying to draw bc's and lactic and venous blood gas. Very tiny veins. Pt states she is always a hard stick. Pt had multiple bruised areas on arrival to ED from ED visit yesterday. 3 unsuccessful attempts. Eating ice chips regularly-encouraged to eat ice very slowly.        Aundra Aschoff, RN  02/18/22 8025

## 2022-02-19 NOTE — ED NOTES
Able to get small amt of blood in venous blood gas tube-brought to lab and spec was rejected immediately.      Adriana Tan, RN  02/18/22 2109

## 2022-02-19 NOTE — ED NOTES
Eating ice (Dr Sarah Damon ok with pt eating ice chips). No pain. Still has dry heaves, but less frequently than earlier. Remains mildly anxious. Asking for benadryl and ativan many times. Dr Sarah Damon aware.         Rosanna Mendez RN  02/19/22 0045

## 2022-02-19 NOTE — ED NOTES
Up to bathroom. Able to void clear dk yellow urine. Spec to lab. Explained new orders    Ativan ordered and taken with sips of water. Eating ice chips with OK by EMD.  Encouraged one piece of ice every 15 minutes.      Frieda Carpenter RN  02/18/22 8584

## 2022-02-19 NOTE — H&P
Hospital Medicine History & Physical      PCP: SUMMER Napier - CNP    Date of Admission: 2/18/2022    Date of Service: Pt seen/examined on 2/18/2022 and Admitted to Inpatient with expected LOS greater than two midnights due to medical therapy. Chief Complaint:  Nausea/Vomiting      History Of Present Illness:      38 y.o. female with PMHx of Cannabis hyperemesis presented to [facility] from [facility] for evaluation of BEATRIZ due to Cannabis hyperemesis syndrome. Pt has history of frequent episodes of hyperemesis requiring hospitalization but has signed out AMA multiple times in the past.  She was seen yesterday at [facility] for BEATRIZ with creat 5 at which time she received LR in the ED. She was admitted but again left AMA before additional work up could be complete. Pt's creatinine has been as high as 10 with bicarb of 7 in December 2021. She did remain in the hospital at that time and her kidney function did recover with aggressive fluid resuscitation. Pt reports nausea and vomiting since Tuesday. Now just dry heaves. Diffuse abdominal cramping. No fever/chills. No diarrhea. No chest pain or shortness of breath. Past Medical History:          Diagnosis Date    Anemia     Cyclical vomiting     Diverticulitis 12/2020    with chronic pancolitis- Dr. Bernice Pereyra History of alcohol abuse     h/o 1/2 pint/day    Marijuana use, continuous     Scabies     Tobacco dependence        Past Surgical History:          Procedure Laterality Date    TYMPANOSTOMY TUBE PLACEMENT         Medications Prior to Admission:      Prior to Admission medications    Medication Sig Start Date End Date Taking? Authorizing Provider   promethazine (PHENERGAN) 25 MG tablet Take 1 tablet by mouth every 8 hours as needed for Nausea WARNING:  May cause drowsiness. May impair ability to operate vehicles or machinery. Do not use in combination with alcohol.  2/18/22 2/25/22 Yes SUMMER Napier - CNP   Ondansetron (ZOFRAN ODT PO) Take 1 tablet by mouth as needed   Yes Historical Provider, MD   MAGNESIUM-OXIDE 400 (241.3 Mg) MG TABS tablet Take 1 tablet by mouth daily 2/17/22  Yes SUMMER Gomez CNP   potassium chloride (KLOR-CON M) 20 MEQ extended release tablet Take 1 tablet by mouth daily as needed (vomiting) 12/27/21  Yes Beverly PilMALCOM hernandezN - CNP       Allergies:  Patient has no known allergies. Social History:        TOBACCO:   reports that she has been smoking. She started smoking about 23 years ago. She has been smoking about 0.50 packs per day. She uses smokeless tobacco.  ETOH:   reports current alcohol use. Family History:      Reviewed in detail positive as follows:        Problem Relation Age of Onset    High Blood Pressure Mother     Pancreatic Cancer Mother 58    Diabetes Maternal Grandmother     Stroke Maternal Grandmother        REVIEW OF SYSTEMS:   Pertinent positives as noted in the HPI. All other systems reviewed and negative. PHYSICAL EXAM PERFORMED:    /89   Pulse 99   Temp 97.7 °F (36.5 °C) (Oral)   Resp 14   Ht 5' 5\" (1.651 m)   Wt 99 lb (44.9 kg)   LMP 02/14/2022   SpO2 100%   BMI 16.47 kg/m²     General appearance:  Cachectic appearing AA female sitting upright on hospital bed, just out of the shower which she states is what helps her the most.  She is in no apparent distress, appears stated age and cooperative. HEENT:  Normal cephalic, atraumatic without obvious deformity. Pupils equal, round, and reactive to light. Conjunctivae/corneas clear. Neck: Supple, with full range of motion. No jugular venous distention. Trachea midline. Respiratory:  Normal respiratory effort. Clear to auscultation bilaterally without accessory muscle use. Cardiovascular:  Regular rate and rhythm without murmurs, no lower extremity edema. Abdomen: Soft emaciated abdomen diffusely tender without rebound or guarding. Normal bowel sounds.   Musculoskeletal:  Moves all extremities equally. Full range of motion without deformity. Skin: Skin warm, dry and intact. No rashes or lesions. Neurologic:  Neurovascularly intact without any focal sensory/motor deficits. Cranial nerves: II-XII intact, grossly non-focal.  Psychiatric:  Alert and oriented, thought content appropriate, normal insight  Capillary Refill: Brisk,< 3 seconds   Peripheral Pulses: +2 palpable, equal bilaterally       Labs:     Recent Labs     02/18/22  1645   WBC 10.1   HGB 14.5   HCT 42.4        Recent Labs     02/18/22  1645   *   K 3.6   CL 91*   CO2 13*   BUN 52*   CREATININE 7.1*   CALCIUM 11.2*     Recent Labs     02/18/22  1645   AST 18   ALT 10   BILITOT 0.5   ALKPHOS 139*     No results for input(s): INR in the last 72 hours. No results for input(s): Ethelene Soulier in the last 72 hours. Urinalysis:      Lab Results   Component Value Date    NITRU Negative 02/18/2022    WBCUA 3-5 02/18/2022    BACTERIA 1+ 02/18/2022    RBCUA 5-10 02/18/2022    BLOODU MODERATE 02/18/2022    SPECGRAV >=1.030 02/18/2022    GLUCOSEU Negative 02/18/2022       Radiology:     EKG:  I have reviewed the EKG with the following interpretation: Normal sinus rhythm, rate 93. No ST elevation    CT ABDOMEN PELVIS WO CONTRAST Additional Contrast? None   Final Result   Mild hydrops of the gallbladder with no gallstones or wall thickening. Suggest ultrasound follow-up, if indicated. Submucosal fat infiltration throughout the colon suggestive of chronic   pancolitis which is grossly unchanged with no bowel obstruction. Mild chronic liver changes which is unchanged      No hydronephrosis or urinary obstruction      Moderate diverticula scattered throughout the colon in the pelvis with no   pericolonic inflammation. Grossly unremarkable uterus with no pelvic mass         XR CHEST PORTABLE   Final Result   No acute cardiopulmonary abnormality identified.              ASSESSMENT:    Active Hospital Problems Diagnosis Date Noted    BEATRIZ (acute kidney injury) ([facility] [address] [N17.9] 02/18/2022         PLAN:    Acute kidney injury  - likely from severe volume contraction, last occurred 12/1/21 resulted in creat 10.5 and bicarb of 7, did recover with aggressive fluid resuscitation  - crt baseline 0.6,  today 7.1  - IVF D5 1/2 NS with 1 amp Bicarb 125 ml/hr  - Avoid nephrotoxins  - check: UACS, Sergio/UCrt, uric acid, TSH, U osmol  - consult nephrology    Cannabis hyperemesis syndrome  - long history of recurrent nausea and vomiting 2/2 marijuana use  - prn anti-emetics  - IV fluids    DVT Prophylaxis: Heparin  Diet: ADULT DIET; Clear Liquid  Code Status: Full Code    Dispo - Inpatient       [address] SUMMER Munroe CNP    Thank you SUMMER Zelaya CNP for the opportunity to be involved in this patient's care. If you have any questions or concerns please feel free to contact me at [phone].

## 2022-02-19 NOTE — ED NOTES
Pt aware of transport to [address] [place] [address],[address] at [phone]. Belongings packed. Eating ice chips. Remains in sinus rhythm to sinus tach on monitor. Still mildly anxious. Still asking for benadryl and ativan. Dr Nix Lies aware of pt's request.   hospitalist orders reviewed. Pt aware that she will see the hospitalist when she gets over to [facility] to face often without dry heaving/vomiting. Doesn't like to wear mask , reminded pt frequently. No pain.      Brenda Wilson RN  02/19/22 9713

## 2022-02-19 NOTE — PROGRESS NOTES
Pt arrived to room 5113 from [facility] via transport. Pt ambulated to bed with steady gait. Fall risk score = 20, low risk. Oriented pt to room and call light. Advised pt to use call light for assistance getting up, if needed. Pt verbalized understanding. Vitals, assessment, and admission complete. Reviewed plan of care. Pt denies further needs or questions at this time.    Electronically signed by Mireille Lewis RN on 2/19/2022 at 12:44 AM

## 2022-02-19 NOTE — CONSULTS
Received nephrology consult for Lesli Galloway, 1980, [id number]      We will see the patient later today. However, we will be available for questions in the meantime. Please call [phone].

## 2022-02-19 NOTE — CONSULTS
Nephrology ([facility]) Consult Note    Jaida Mota is a 38 y.o. female whom we were asked to see for BEATRIZ. The patient presents with persistent N/V. She has a diagnosis of canniboid hyperemesis syndrome. She has frequent hospitalizations and signs out AMA. Her serum creatinine was 7.1 yesterday and ha already improved to 2.4 today. Past Medical History:  canniboid hyperemesis syndrome    Review of System:  Otherwise unremarkable    Allergies:  Patient has no known allergies. Medications:  Current medications reviewed. Social History:  tobacco abuse  Family Medical History:  Negative for kidney disase    Physical Exam:  Blood pressure 113/71, pulse 87, temperature 98.2 °F (36.8 °C), temperature source Oral, resp. rate 16, height 5' 5\" (1.651 m), weight 100 lb 5 oz (45.5 kg), last menstrual period 02/14/2022, SpO2 100 %.     General:  NAD, A+Ox3, ill-appearing, frail body habitus  HEENT:  PERRL, EOMI  Neck:  Supple, normal range of movement, thyroid unremarkable  Chest:  CTAB, good respiratory effort, good air movement  CV:  RRR, no murmurs or rubs, no carotid bruit, no abdominal bruits  Abdomen:  NTND, soft, +BS, no hepatosplenomegaly  Extremities:  No peripheral edema  Neurological:  Moving all four extremities, CN II-XII grossly intact  Lymphatics:  No palpable lymph nodes  Skin:  No rash, no jaundice  Psychiatric:  poor insight and judgement, poor recall    Laboratory Studies:  Lab Results   Component Value Date/Time     02/19/2022 05:43 AM    K 3.0 (L) 02/19/2022 05:43 AM     02/19/2022 05:43 AM    CO2 17 (L) 02/19/2022 05:43 AM    BUN 37 (H) 02/19/2022 05:43 AM    CREATININE 2.4 (H) 02/19/2022 05:43 AM    CALCIUM 8.8 02/19/2022 05:43 AM    PHOS 2.6 11/26/2020 12:40 PM    MG 2.50 (H) 02/19/2022 05:43 AM     Lab Results   Component Value Date/Time    WBC 6.9 02/19/2022 05:43 AM    HGB 11.8 (L) 02/19/2022 05:43 AM    HCT 35.3 (L) 02/19/2022 05:43 AM     02/19/2022 05:43 AM       Assessment/Plan:  Reviewed old records and labs. 1) BEATRIZ   - baseline 12/08/21 Cr 0.68   - pre-renal    - renal function already much improved   - continue IVF until tomorrow    2) canniboid hyperemesis syndrome   - encouraged to quit    4) tobacco abuse   - encouraged to quit    5) FEN   - acidosis    - on NaHCO3 gtt   - hypokalemia    - supplemented    Poor prognosis.

## 2022-02-19 NOTE — PROGRESS NOTES
Hospitalist Progress Note    CC: BEATRIZ (acute kidney injury) ([facility] [address]      Admit date: 2/18/2022  Days in hospital:  1    Subjective/interval history: Pt S/E. Pt feels better, states she wants to go home. Tolerating clear liquids. No emesis, still has nausea    O2 status: room air    ROS:   Pertinent items are noted in HPI. .    Objective:    /72   Pulse 93   Temp 97.7 °F (36.5 °C) (Oral)   Resp 18   Ht 5' 5\" (1.651 m)   Wt 100 lb 5 oz (45.5 kg)   LMP 02/14/2022   SpO2 99%   BMI 16.69 kg/m²     Gen: alert, NAD, sitting up in bed  HEENT: NC/AT, moist mucous membranes, no oropharyngeal erythema or exudate  Neck: supple, trachea midline, no anterior cervical or SC LAD  Heart: Normal s1/s2, RRR, no murmurs, gallops, or rubs. Lungs: clear bilaterally, no wheezing, no rales, no rhonchi, no use of accessory muscles  Abd: bowel sounds present, soft, nontender, nondistended, no masses  Extrem: No clubbing, cyanosis, no edema  Skin: no rashes or lesions  Psych: A & O x3, affect appropriate  Neuro: grossly intact, moves all four extremities spontaneously.   Cap refill: +2 sec    Medications:  Scheduled Meds:   sodium chloride flush  5-40 mL IntraVENous 2 times per day    heparin (porcine)  5,000 Units SubCUTAneous 3 times per day       PRN Meds:  LORazepam, sodium chloride flush, sodium chloride, ondansetron **OR** ondansetron, acetaminophen **OR** acetaminophen    IV:   IV infusion builder 150 mL/hr at 02/19/22 0123    sodium chloride           Intake/Output Summary (Last 24 hours) at 2/19/2022 0835  Last data filed at 2/19/2022 0701  Gross per 24 hour   Intake 750 ml   Output 300 ml   Net 450 ml       Results:  CBC:   Recent Labs     02/18/22  1645 02/19/22  0543   WBC 10.1 6.9   HGB 14.5 11.8*   HCT 42.4 35.3*   MCV 93.4 94.0    238     BMP:   Recent Labs     02/18/22  1645 02/19/22  0543   * 142   K 3.6 3.0*   CL 91* 104   CO2 13* 17*   BUN 52* 37*   CREATININE 7.1* 2.4* Mag: No results for input(s): MAG in the last 72 hours. Phos:   Lab Results   Component Value Date    PHOS 2.6 11/26/2020     No results found for: GLU    LIVER PROFILE:   Recent Labs     02/18/22  1645 02/19/22  0543   AST 18 14*   ALT 10 8*   LIPASE 25.0  --    BILITOT 0.5 0.7   ALKPHOS 139* 98     PT/INR:   Recent Labs     02/19/22  0543   PROTIME 11.2   INR 0.99     APTT:   Recent Labs     02/19/22  0543   APTT 32.2     UA:  Recent Labs     02/18/22  1845   COLORU Yellow   PHUR 5.5   WBCUA 3-5   RBCUA 5-10*   BACTERIA 1+*   CLARITYU Clear   SPECGRAV >=1.030   LEUKOCYTESUR Negative   UROBILINOGEN 0.2   BILIRUBINUR SMALL*   BLOODU MODERATE*   GLUCOSEU Negative       Invalid input(s): ABG  Lab Results   Component Value Date    CALCIUM 8.8 02/19/2022    PHOS 2.6 11/26/2020       Assessment:    Principal Problem:    BEATRIZ (acute kidney injury) ([address]  Resolved Problems:    * No resolved hospital problems. [facility] course: 38 y.o. female with PMHx of Cannabis hyperemesis presented to [facility] from [facility] for evaluation of BEATRIZ due to Cannabis hyperemesis syndrome. Pt has history of frequent episodes of hyperemesis requiring hospitalization but has signed out AMA multiple times in the past.  She was seen yesterday at [facility] for BEATRIZ with creat 5 at which time she received LR in the ED. She was admitted but again left AMA before additional work up could be complete. Pt's creatinine has been as high as 10 with bicarb of 7 in December 2021. She did remain in the hospital at that time and her kidney function did recover with aggressive fluid resuscitation.     Plan:  Acute kidney injury  - crt baseline 0.6; on admission 7.1 -> 2.4  - cont IVF D5 1/2 NS with 1 amp Bicarb 125 ml/hr  - Avoid nephrotoxins  - consulted nephrology     Cannabis hyperemesis syndrome  - long history of recurrent nausea and vomiting 2/2 marijuana use  - prn anti-emetics  - IV fluids  - no iv pain meds   - advance diet as toelrated    Code status:  full  DVT prophylaxis: [x] Lovenox  [] SQ Heparin  [] SCDs because of  [] warfarin/oral direct thrombin inhibitor [] Encourage ambulation      Disposition:  [] Home [] Rehab [] Psych [] SNF  [] LTAC  [] Transfer to ICU  [] Transfer to PCU [] Other: in pt      Electronically signed by Jenni Bhagat DO on 2/19/2022 at 8:35 AM

## 2022-02-19 NOTE — ED NOTES
[facility] talked with pt at length about her test results and the need to be admitted.  [facility] discussed that pt may need dialysis due to severe kidney failure.      William Rivera RN  02/19/22 0552

## 2022-02-19 NOTE — ED NOTES
Dr Abdulaziz Cantu updated about difficulty getting rest of blood. Another RN to try to get labwork.      Adriana Tan, ZINA  02/18/22 2022

## 2022-02-19 NOTE — PROGRESS NOTES
4 Eyes Skin Assessment     NAME:  Jaida Mota  YOB: 1980  MEDICAL RECORD NUMBER:  [id number]    The patient is being assess for  Admission    I agree that 2 RN's have performed a thorough Head to Toe Skin Assessment on the patient. ALL assessment sites listed below have been assessed. Areas assessed by both nurses:    Head, Face, Ears, Shoulders, Back, Chest, Arms, Elbows, Hands, Sacrum. Buttock, Coccyx, Ischium and Legs. Feet and Heels        Does the Patient have a Wound?  No noted wound(s)       Vishal Prevention initiated:  Yes   Wound Care Orders initiated:  No    Pressure Injury (Stage 3,4, Unstageable, DTI, NWPT, and Complex wounds) if present place consult order under [de-identified] No    New and Established Ostomies if present place consult order under : No      Nurse 1 eSignature: Electronically signed by Nehemias Ford RN on 2/19/22 at 12:45 AM EST    **SHARE this note so that the co-signing nurse is able to place an eSignature**    Nurse 2 eSignature: Electronically signed by Kimmy Lawrence RN on 2/19/22 at 6:40 AM EST

## 2022-02-20 VITALS
OXYGEN SATURATION: 100 % | TEMPERATURE: 98.1 F | HEIGHT: 65 IN | SYSTOLIC BLOOD PRESSURE: 114 MMHG | RESPIRATION RATE: 18 BRPM | WEIGHT: 106.92 LBS | BODY MASS INDEX: 17.81 KG/M2 | HEART RATE: 78 BPM | DIASTOLIC BLOOD PRESSURE: 70 MMHG

## 2022-02-20 LAB
ANION GAP SERPL CALCULATED.3IONS-SCNC: 13 MMOL/L (ref 3–16)
BASOPHILS ABSOLUTE: 0 K/UL (ref 0–0.2)
BASOPHILS RELATIVE PERCENT: 0.4 %
BUN BLDV-MCNC: 13 MG/DL (ref 7–20)
CALCIUM SERPL-MCNC: 8.3 MG/DL (ref 8.3–10.6)
CHLORIDE BLD-SCNC: 97 MMOL/L (ref 99–110)
CO2: 28 MMOL/L (ref 21–32)
CREAT SERPL-MCNC: 0.6 MG/DL (ref 0.6–1.1)
EOSINOPHILS ABSOLUTE: 0 K/UL (ref 0–0.6)
EOSINOPHILS RELATIVE PERCENT: 0.5 %
GFR AFRICAN AMERICAN: >60
GFR NON-AFRICAN AMERICAN: >60
GLUCOSE BLD-MCNC: 98 MG/DL (ref 70–99)
HCT VFR BLD CALC: 33.5 % (ref 36–48)
HEMOGLOBIN: 11.1 G/DL (ref 12–16)
LYMPHOCYTES ABSOLUTE: 2 K/UL (ref 1–5.1)
LYMPHOCYTES RELATIVE PERCENT: 46.7 %
MAGNESIUM: 1.7 MG/DL (ref 1.8–2.4)
MCH RBC QN AUTO: 31.2 PG (ref 26–34)
MCHC RBC AUTO-ENTMCNC: 33.2 G/DL (ref 31–36)
MCV RBC AUTO: 94.1 FL (ref 80–100)
MONOCYTES ABSOLUTE: 0.5 K/UL (ref 0–1.3)
MONOCYTES RELATIVE PERCENT: 11.4 %
NEUTROPHILS ABSOLUTE: 1.8 K/UL (ref 1.7–7.7)
NEUTROPHILS RELATIVE PERCENT: 41 %
PDW BLD-RTO: 13.8 % (ref 12.4–15.4)
PHOSPHORUS: 1.2 MG/DL (ref 2.5–4.9)
PLATELET # BLD: 201 K/UL (ref 135–450)
PMV BLD AUTO: 8.2 FL (ref 5–10.5)
POTASSIUM REFLEX MAGNESIUM: 3.1 MMOL/L (ref 3.5–5.1)
RBC # BLD: 3.56 M/UL (ref 4–5.2)
SODIUM BLD-SCNC: 138 MMOL/L (ref 136–145)
WBC # BLD: 4.3 K/UL (ref 4–11)

## 2022-02-20 PROCEDURE — 6360000002 HC RX W HCPCS: Performed by: FAMILY MEDICINE

## 2022-02-20 PROCEDURE — 2580000003 HC RX 258: Performed by: NURSE PRACTITIONER

## 2022-02-20 PROCEDURE — 36415 COLL VENOUS BLD VENIPUNCTURE: CPT

## 2022-02-20 PROCEDURE — 6360000002 HC RX W HCPCS: Performed by: NURSE PRACTITIONER

## 2022-02-20 PROCEDURE — 85025 COMPLETE CBC W/AUTO DIFF WBC: CPT

## 2022-02-20 PROCEDURE — 83735 ASSAY OF MAGNESIUM: CPT

## 2022-02-20 PROCEDURE — 84100 ASSAY OF PHOSPHORUS: CPT

## 2022-02-20 PROCEDURE — 6370000000 HC RX 637 (ALT 250 FOR IP): Performed by: FAMILY MEDICINE

## 2022-02-20 PROCEDURE — 80048 BASIC METABOLIC PNL TOTAL CA: CPT

## 2022-02-20 RX ORDER — MAGNESIUM SULFATE IN WATER 40 MG/ML
2000 INJECTION, SOLUTION INTRAVENOUS ONCE
Status: COMPLETED | OUTPATIENT
Start: 2022-02-20 | End: 2022-02-20

## 2022-02-20 RX ORDER — POTASSIUM CHLORIDE 20 MEQ/1
20 TABLET, EXTENDED RELEASE ORAL DAILY
Qty: 3 TABLET | Refills: 0 | Status: SHIPPED | OUTPATIENT
Start: 2022-02-20 | End: 2022-02-23

## 2022-02-20 RX ADMIN — HEPARIN SODIUM 5000 UNITS: 5000 INJECTION INTRAVENOUS; SUBCUTANEOUS at 06:12

## 2022-02-20 RX ADMIN — MAGNESIUM SULFATE HEPTAHYDRATE 2000 MG: 40 INJECTION, SOLUTION INTRAVENOUS at 09:06

## 2022-02-20 RX ADMIN — Medication 10 ML: at 09:01

## 2022-02-20 RX ADMIN — POTASSIUM BICARBONATE 40 MEQ: 782 TABLET, EFFERVESCENT ORAL at 09:00

## 2022-02-20 ASSESSMENT — PAIN SCALES - GENERAL: PAINLEVEL_OUTOF10: 0

## 2022-02-20 NOTE — PLAN OF CARE
Problem: Nausea/Vomiting:  Goal: Absence of nausea/vomiting  Description: Absence of nausea/vomiting  2/20/2022 0931 by Nato Albert RN  Outcome: Ongoing

## 2022-02-20 NOTE — DISCHARGE SUMMARY
Hospital Medicine Discharge Summary    Patient: Hannah Garcia     Gender: female  : 1980   Age: 38 y.o. MRN: [id number]    Code Status: Full Code     Primary Care Provider: SUMMER Ramirez CNP    Admit Date: 2022   Discharge Date:   2022    Admitting Physician: Sonny Craven MD  Discharge Physician: Denny Calle DO     Discharge Diagnoses: Active Hospital Problems    Diagnosis Date Noted    BEATRIZ (acute kidney injury) ([facility] [address] [N17.9] 2022       Hospital Course:   38 y.o. female with PMHx of Cannabis hyperemesis presented to [facility] from [facility] for evaluation of BEATRIZ due to Cannabis hyperemesis syndrome. Pt has history of frequent episodes of hyperemesis requiring hospitalization but has signed out AMA multiple times in the past. Keesha Beatty was seen yesterday at [facility] for BEATRIZ with creat 5 at which time she received LR in the ED. She was admitted but again left AMA before additional work up could be complete. Pt's creatinine has been as high as 10 with bicarb of 7 in 2021.  She did remain in the hospital at that time and her kidney function did recover with aggressive fluid resuscitation. Work up completed, and improved with treatment as below. was discharged today in stable condition. Acute kidney injury - resolved with ivf  - crt baseline 0.6; on admission 7.1 -> 2.4  - IVF D5 1/2 NS with 1 amp Bicarb 125 ml/hr  - consulted nephrology     Cannabis hyperemesis syndrome  - long history of recurrent nausea and vomiting 2/2 marijuana use  - advance diet as tolerated - ate a regular diet today    Disposition:  Home    Exam:     /70   Pulse 78   Temp 98.1 °F (36.7 °C) (Oral)   Resp 18   Ht 5' 5\" (1.651 m)   Wt 106 lb 14.8 oz (48.5 kg)   LMP 2022   SpO2 100%   BMI 17.79 kg/m²     General appearance:  No apparent distress, appears stated age and cooperative. HEENT:  Normal cephalic, atraumatic without obvious deformity.  Pupils equal, round, and reactive to light. Extra ocular muscles intact. Conjunctivae/corneas clear. Neck: Supple, with full range of motion. No jugular venous distention. Trachea midline. Respiratory:  Normal respiratory effort. Clear to auscultation, bilaterally without Rales/Wheezes/Rhonchi. Cardiovascular:  Regular rate and rhythm with normal S1/S2 without murmurs, rubs or gallops. Abdomen: Soft, non-tender, non-distended with normal bowel sounds. Musculoskeletal:  No clubbing, cyanosis or edema bilaterally. Full range of motion without deformity. Skin: Skin color, texture, turgor normal.  No rashes or lesions. Neurologic:  Neurovascularly intact without any focal sensory/motor deficits. Cranial nerves: II-XII intact, grossly non-focal.  Psychiatric:  Alert and oriented, thought content appropriate, normal insight    Consults:     IP CONSULT TO NEPHROLOGY    Labs: For convenience and continuity at follow-up the following most recent labs are provided:    Lab Results   Component Value Date    WBC 4.3 02/20/2022    HGB 11.1 02/20/2022    HCT 33.5 02/20/2022    MCV 94.1 02/20/2022     02/20/2022     02/20/2022    K 3.1 02/20/2022    CL 97 02/20/2022    CO2 28 02/20/2022    BUN 13 02/20/2022    CREATININE 0.6 02/20/2022    CALCIUM 8.3 02/20/2022    PHOS 1.2 02/20/2022    ALKPHOS 98 02/19/2022    ALT 8 02/19/2022    AST 14 02/19/2022    BILITOT 0.7 02/19/2022    BILIDIR 0.21 08/24/2010    LABALBU 4.6 02/19/2022    LDLCALC 94 05/13/2021    TRIG 61 08/24/2010     Lab Results   Component Value Date    INR 0.99 02/19/2022       Radiology:  CT ABDOMEN PELVIS WO CONTRAST Additional Contrast? None   Final Result   Mild hydrops of the gallbladder with no gallstones or wall thickening. Suggest ultrasound follow-up, if indicated. Submucosal fat infiltration throughout the colon suggestive of chronic   pancolitis which is grossly unchanged with no bowel obstruction.       Mild chronic liver changes which is unchanged      No hydronephrosis or urinary obstruction      Moderate diverticula scattered throughout the colon in the pelvis with no   pericolonic inflammation. Grossly unremarkable uterus with no pelvic mass         XR CHEST PORTABLE   Final Result   No acute cardiopulmonary abnormality identified. Discharge Medications:   Current Discharge Medication List        Current Discharge Medication List      CONTINUE these medications which have CHANGED    Details   potassium chloride (KLOR-CON M) 20 MEQ extended release tablet Take 1 tablet by mouth daily for 3 days  Qty: 3 tablet, Refills: 0    Associated Diagnoses: Cannabis hyperemesis syndrome concurrent with and due to cannabis abuse ([facility] [address]           Current Discharge Medication List      CONTINUE these medications which have NOT CHANGED    Details   promethazine (PHENERGAN) 25 MG tablet Take 1 tablet by mouth every 8 hours as needed for Nausea WARNING:  May cause drowsiness. May impair ability to operate vehicles or machinery. Do not use in combination with alcohol. Qty: 15 tablet, Refills: 0      ondansetron (ZOFRAN ODT) 4 MG disintegrating tablet Take 1 tablet by mouth as needed       MAGNESIUM-OXIDE 400 (241.3 Mg) MG TABS tablet Take 1 tablet by mouth daily  Qty: 30 tablet, Refills: 1           Current Discharge Medication List          Follow-up appointments:  one week    Provider Follow-up:    pcp    Condition at Discharge:  Stable    The patient was seen and examined on day of discharge and this discharge summary is in conjunction with any daily progress note from day of discharge. Time Spent on discharge is 45 minutes  in the examination, evaluation, counseling and review of medications and discharge plan. Jessica Monson DO   2/20/2022      Thank you SUMMER Schroeder CNP for the opportunity to be involved in this patient's care. If you have any questions or concerns please feel free to contact me at [phone].

## 2022-02-20 NOTE — CARE COORDINATION
INITIAL CASE MANAGEMENT ASSESSMENT    Reviewed chart, called patient to assess possible discharge needs. Explained Case Management role/services. Living Situation: Patient lives in an apartment. IPTA    ADLs: IPTA     DME: None    PT/OT Recs: n/a     Active Services: None     Transportation: Needs Lyft at discharge. Scheduled for 1pm     Medications: [facility] on [address]    PCP: SUMMER Zelaya       HD/PD: n/a    PLAN/COMMENTS: Home with Lyft scheduled for 1pm.    SW/CM provided contact information for patient or family to call with any questions. SW/CM will follow and assist as needed.     Electronically signed by Claire Thompson RN MSN on 2/20/2022 at 10:46 AM

## 2022-02-20 NOTE — PLAN OF CARE
Problem: Nausea/Vomiting:  Goal: Absence of nausea/vomiting  Outcome: Met This Shift  Note: No complaints of nausea or emesis this shift. Problem: Nausea/Vomiting:  Goal: Able to drink  Outcome: Met This Shift  Note: Requested fluids and tolerated without difficulty. Problem: Nausea/Vomiting:  Goal: Able to eat  Outcome: Met This Shift  Note: Has tolerated small amounts of snacks requested through night.      Problem: Nausea/Vomiting:  Goal: Ability to achieve adequate nutritional intake will improve  Outcome: Ongoing Applied 07-Mar-2021 19:41

## 2022-02-20 NOTE — PROGRESS NOTES
Discharge orders acknowledged by RN . Discharge teaching completed with pt. AVS reviewed and all questions answered. Medication regimen reviewed and pt understands schedule. Follow up appointments also reviewed with pt and resources given for discharge. Pt was sent electronic to be filled and understands schedule. IV removed. Telemonitor removed and returned to [address] [place] [address]. 60+ minutes of education completed. Required core measures completed. Pt vitals WDL. Pt discharged with all belongings to private residence. Pt denied need for wheelchair and ambulated off of unit. No complications.  Electronically signed by Colletta Rowan, RN on 2/20/2022 at 11:31 AM

## 2022-02-23 LAB — BLOOD CULTURE, ROUTINE: NORMAL

## 2023-02-14 NOTE — PROGRESS NOTES
Chidi Lyons (:  1980) is a 41 y.o. female,New patient, here for evaluation of the following chief complaint(s):  New Patient    Past medical history significant for diverticulosis, daily marijuana use, smoker, alcohol use disorder. ASSESSMENT/PLAN:  1. Hypokalemia-  Diagnosed with hypokalemia in the past, was completed with potassium supplement and used to take as needed if she had cramp. Labs ordered. -     Basic Metabolic Panel; Future  -     CBC with Auto Differential; Future  2. Chronic cough-in the patient with a history of daily marijuana use and daily smoker. We will get a chest x-ray  Counseling provided to stop smoking and cigarettes. Time spent counseling for smoking cessation: 5 minutes     -     Basic Metabolic Panel; Future  -     CBC with Auto Differential; Future  -     cetirizine (ZYRTEC) 10 MG tablet; Take 1 tablet by mouth daily for 90 doses, Disp-90 tablet, R-0Normal  -     albuterol sulfate HFA (VENTOLIN HFA) 108 (90 Base) MCG/ACT inhaler; Inhale 2 puffs into the lungs 4 times daily as needed for Wheezing, Disp-18 g, R-0Normal  -     XR CHEST (2 VW); Future  3. Anxiety-poorly controlled, due to this she has been using cigarettes, marijuana and alcohol on a regular basis. -     busPIRone (BUSPAR) 5 MG tablet; Take 1 tablet by mouth 2 times daily, Disp-60 tablet, R-0Normal    4. Allergic cough-start on Zyrtec nightly and albuterol as needed. 5. Other depression-new, start her on Remeron.  -     mirtazapine (REMERON) 15 MG tablet; Take 1 tablet by mouth nightly, Disp-90 tablet, R-1Normal  6. Low weight-associated with decreased appetite, low calorie intake. Educated in diet, will treat for depression and anxiety. Educated to decrease cigarette, alcohol use and marijuana use. -     mirtazapine (REMERON) 15 MG tablet; Take 1 tablet by mouth nightly, Disp-90 tablet, R-1Normal  -     busPIRone (BUSPAR) 5 MG tablet;  Take 1 tablet by mouth 2 times daily, Disp-60 tablet, R-0Normal    7. Encounter for screening mammogram for malignant neoplasm of the breast-mammogram ordered for    Return in about 3 months (around 5/15/2023). Subjective   SUBJECTIVE/OBJECTIVE:  Cough  This is a chronic problem. The current episode started more than 1 year ago. The problem has been waxing and waning. The problem occurs constantly. Cough characteristics: clear phlegm. Associated symptoms include nasal congestion, postnasal drip and rhinorrhea. Pertinent negatives include no chest pain, chills, ear congestion, ear pain, eye redness, fever, headaches, hemoptysis, sore throat, shortness of breath or weight loss. Risk factors for lung disease include smoking/tobacco exposure. She has tried nothing for the symptoms. Review of Systems   Constitutional:  Negative for chills, fatigue, fever and weight loss. HENT:  Positive for postnasal drip and rhinorrhea. Negative for congestion, ear pain and sore throat. Eyes:  Negative for discharge, redness and visual disturbance. Respiratory:  Positive for cough. Negative for hemoptysis, chest tightness and shortness of breath. Cardiovascular:  Negative for chest pain, palpitations and leg swelling. Gastrointestinal:  Negative for abdominal pain, nausea and vomiting. Endocrine: Negative. Genitourinary:  Negative for dysuria. Musculoskeletal:  Negative for back pain and gait problem. Skin: Negative. Neurological:  Negative for syncope, facial asymmetry, speech difficulty, light-headedness and headaches. Psychiatric/Behavioral:  Negative for suicidal ideas. Objective   Physical Exam  Vitals reviewed. Constitutional:       Appearance: Normal appearance. HENT:      Head: Normocephalic. Eyes:      Extraocular Movements: Extraocular movements intact. Pupils: Pupils are equal, round, and reactive to light. Cardiovascular:      Rate and Rhythm: Normal rate. Heart sounds: Normal heart sounds.  No murmur heard.  Pulmonary:      Effort: Pulmonary effort is normal.      Breath sounds: Normal breath sounds. Abdominal:      General: Bowel sounds are normal.      Palpations: Abdomen is soft. Musculoskeletal:         General: Normal range of motion. Skin:     General: Skin is warm. Neurological:      General: No focal deficit present. Mental Status: She is alert and oriented to person, place, and time. Mental status is at baseline. Psychiatric:         Mood and Affect: Mood normal.                An electronic signature was used to authenticate this note.     --Sheryl Qureshi MD

## 2023-02-15 ENCOUNTER — OFFICE VISIT (OUTPATIENT)
Dept: INTERNAL MEDICINE CLINIC | Age: 43
End: 2023-02-15

## 2023-02-15 VITALS
TEMPERATURE: 97.3 F | BODY MASS INDEX: 17.97 KG/M2 | WEIGHT: 108 LBS | HEART RATE: 74 BPM | DIASTOLIC BLOOD PRESSURE: 81 MMHG | SYSTOLIC BLOOD PRESSURE: 131 MMHG

## 2023-02-15 DIAGNOSIS — F41.9 ANXIETY: ICD-10-CM

## 2023-02-15 DIAGNOSIS — R05.3 CHRONIC COUGH: ICD-10-CM

## 2023-02-15 DIAGNOSIS — E87.6 HYPOKALEMIA: Primary | ICD-10-CM

## 2023-02-15 DIAGNOSIS — F32.89 OTHER DEPRESSION: ICD-10-CM

## 2023-02-15 DIAGNOSIS — R63.6 LOW WEIGHT: ICD-10-CM

## 2023-02-15 DIAGNOSIS — Z12.31 ENCOUNTER FOR SCREENING MAMMOGRAM FOR MALIGNANT NEOPLASM OF BREAST: ICD-10-CM

## 2023-02-15 DIAGNOSIS — R05.8 ALLERGIC COUGH: ICD-10-CM

## 2023-02-15 LAB
ANION GAP SERPL CALCULATED.3IONS-SCNC: 15 MMOL/L (ref 3–16)
BASOPHILS ABSOLUTE: 0 K/UL (ref 0–0.2)
BASOPHILS RELATIVE PERCENT: 0.8 %
BUN BLDV-MCNC: 10 MG/DL (ref 7–20)
CALCIUM SERPL-MCNC: 9.5 MG/DL (ref 8.3–10.6)
CHLORIDE BLD-SCNC: 101 MMOL/L (ref 99–110)
CO2: 21 MMOL/L (ref 21–32)
CREAT SERPL-MCNC: <0.5 MG/DL (ref 0.6–1.1)
EOSINOPHILS ABSOLUTE: 0 K/UL (ref 0–0.6)
EOSINOPHILS RELATIVE PERCENT: 0.8 %
GFR SERPL CREATININE-BSD FRML MDRD: >60 ML/MIN/{1.73_M2}
GLUCOSE BLD-MCNC: 73 MG/DL (ref 70–99)
HCT VFR BLD CALC: 32.6 % (ref 36–48)
HEMOGLOBIN: 11.1 G/DL (ref 12–16)
LYMPHOCYTES ABSOLUTE: 1.9 K/UL (ref 1–5.1)
LYMPHOCYTES RELATIVE PERCENT: 47.5 %
MCH RBC QN AUTO: 34.8 PG (ref 26–34)
MCHC RBC AUTO-ENTMCNC: 34.1 G/DL (ref 31–36)
MCV RBC AUTO: 102 FL (ref 80–100)
MONOCYTES ABSOLUTE: 0.3 K/UL (ref 0–1.3)
MONOCYTES RELATIVE PERCENT: 7.3 %
NEUTROPHILS ABSOLUTE: 1.8 K/UL (ref 1.7–7.7)
NEUTROPHILS RELATIVE PERCENT: 43.6 %
PDW BLD-RTO: 15.9 % (ref 12.4–15.4)
PLATELET # BLD: 285 K/UL (ref 135–450)
PMV BLD AUTO: 8 FL (ref 5–10.5)
POTASSIUM SERPL-SCNC: 4.3 MMOL/L (ref 3.5–5.1)
RBC # BLD: 3.2 M/UL (ref 4–5.2)
SODIUM BLD-SCNC: 137 MMOL/L (ref 136–145)
WBC # BLD: 4.1 K/UL (ref 4–11)

## 2023-02-15 RX ORDER — ALBUTEROL SULFATE 90 UG/1
2 AEROSOL, METERED RESPIRATORY (INHALATION) 4 TIMES DAILY PRN
Qty: 18 G | Refills: 0 | Status: SHIPPED | OUTPATIENT
Start: 2023-02-15

## 2023-02-15 RX ORDER — BUSPIRONE HYDROCHLORIDE 5 MG/1
5 TABLET ORAL 2 TIMES DAILY
Qty: 60 TABLET | Refills: 0 | Status: SHIPPED | OUTPATIENT
Start: 2023-02-15 | End: 2023-03-17

## 2023-02-15 RX ORDER — BUPROPION HYDROCHLORIDE 150 MG/1
150 TABLET, EXTENDED RELEASE ORAL DAILY
Qty: 60 TABLET | Refills: 3 | Status: SHIPPED | OUTPATIENT
Start: 2023-02-15 | End: 2023-02-15

## 2023-02-15 RX ORDER — MIRTAZAPINE 15 MG/1
15 TABLET, FILM COATED ORAL NIGHTLY
Qty: 90 TABLET | Refills: 1 | Status: SHIPPED | OUTPATIENT
Start: 2023-02-15

## 2023-02-15 RX ORDER — CETIRIZINE HYDROCHLORIDE 10 MG/1
10 TABLET ORAL DAILY
Qty: 90 TABLET | Refills: 0 | Status: SHIPPED | OUTPATIENT
Start: 2023-02-15 | End: 2023-05-16

## 2023-02-15 SDOH — HEALTH STABILITY: PHYSICAL HEALTH: ON AVERAGE, HOW MANY DAYS PER WEEK DO YOU ENGAGE IN MODERATE TO STRENUOUS EXERCISE (LIKE A BRISK WALK)?: 0 DAYS

## 2023-02-15 ASSESSMENT — PATIENT HEALTH QUESTIONNAIRE - PHQ9
2. FEELING DOWN, DEPRESSED OR HOPELESS: 0
SUM OF ALL RESPONSES TO PHQ QUESTIONS 1-9: 0
SUM OF ALL RESPONSES TO PHQ9 QUESTIONS 1 & 2: 0
1. LITTLE INTEREST OR PLEASURE IN DOING THINGS: 0
SUM OF ALL RESPONSES TO PHQ QUESTIONS 1-9: 0

## 2023-02-15 ASSESSMENT — ENCOUNTER SYMPTOMS
ABDOMINAL PAIN: 0
SHORTNESS OF BREATH: 0
SORE THROAT: 0
EYE REDNESS: 0
RHINORRHEA: 1
EYE DISCHARGE: 0
VOMITING: 0
CHEST TIGHTNESS: 0
BACK PAIN: 0
NAUSEA: 0
HEMOPTYSIS: 0
COUGH: 1

## 2023-02-16 ENCOUNTER — TELEPHONE (OUTPATIENT)
Dept: INTERNAL MEDICINE CLINIC | Age: 43
End: 2023-02-16

## 2023-02-16 DIAGNOSIS — D50.9 IRON DEFICIENCY ANEMIA, UNSPECIFIED IRON DEFICIENCY ANEMIA TYPE: Primary | ICD-10-CM

## 2023-02-16 RX ORDER — FERROUS SULFATE 325(65) MG
325 TABLET ORAL
Qty: 90 TABLET | Refills: 1 | Status: SHIPPED | OUTPATIENT
Start: 2023-02-16

## 2023-02-16 NOTE — TELEPHONE ENCOUNTER
Mirtazapine 15 mg , buspirone 5 mg , bupropin 150 mg , should she be on all three or what     Please call her back at [phone]

## 2023-03-09 DIAGNOSIS — R05.3 CHRONIC COUGH: ICD-10-CM

## 2023-03-09 DIAGNOSIS — F41.9 ANXIETY: ICD-10-CM

## 2023-03-09 RX ORDER — BUSPIRONE HYDROCHLORIDE 5 MG/1
TABLET ORAL
Qty: 180 TABLET | Refills: 2 | Status: SHIPPED | OUTPATIENT
Start: 2023-03-09

## 2023-03-09 RX ORDER — CETIRIZINE HYDROCHLORIDE 10 MG/1
10 TABLET ORAL DAILY
Qty: 90 TABLET | Refills: 0 | Status: SHIPPED | OUTPATIENT
Start: 2023-03-09

## 2023-06-23 ENCOUNTER — OFFICE VISIT (OUTPATIENT)
Age: 43
End: 2023-06-23

## 2023-06-23 VITALS
BODY MASS INDEX: 18.83 KG/M2 | SYSTOLIC BLOOD PRESSURE: 129 MMHG | HEIGHT: 65 IN | TEMPERATURE: 98 F | HEART RATE: 85 BPM | WEIGHT: 113 LBS | DIASTOLIC BLOOD PRESSURE: 87 MMHG | OXYGEN SATURATION: 94 %

## 2023-06-23 DIAGNOSIS — L85.3 DRY SKIN DERMATITIS: Primary | ICD-10-CM

## 2023-06-23 RX ORDER — MUPIROCIN CALCIUM 20 MG/G
CREAM TOPICAL
Qty: 30 G | Refills: 0 | Status: SHIPPED | OUTPATIENT
Start: 2023-06-23 | End: 2023-07-23

## 2023-06-23 ASSESSMENT — ENCOUNTER SYMPTOMS
SORE THROAT: 0
SHORTNESS OF BREATH: 0
COUGH: 0
RHINORRHEA: 0

## 2023-06-23 NOTE — PROGRESS NOTES
Amber Calles (:  1980) is a 41 y.o. female,New patient, here for evaluation of the following chief complaint(s):  Rash (Dry skin on lateral left lower leg for 2 days, right forearm)    ASSESSMENT/PLAN:    ICD-10-CM    1. Dry skin dermatitis  L85.3 mupirocin (BACTROBAN) 2 % cream        44yo F presents for rash on left lower leg and right forearm. She denies anything new. On physical exam the areas are flaky and dry. One area on the lower leg appears to be red with possible infection. Will treat with mupirocin cream. She is to use Cetaphil moisturizing cream for other areas. If symptoms worsen go to the Emergency Department, if symptoms fail to improve return to the clinic or follow up with PCP. SUBJECTIVE/OBJECTIVE:  45yo F presents for rash on left shin and right forearm x2 days. Reports area on her leg is sensitive. History provided by:  Patient   used: No    Rash  This is a new problem. The current episode started in the past 7 days. The affected locations include the left lower leg and right arm. The rash is characterized by scaling and dryness. She was exposed to nothing. Pertinent negatives include no congestion, cough, fever, rhinorrhea, shortness of breath or sore throat. Past treatments include nothing. Vitals:    23   BP: 129/87   Site: Left Upper Arm   Position: Sitting   Cuff Size: Medium Adult   Pulse: 85   Temp: 98 °F (36.7 °C)   TempSrc: Oral   SpO2: 94%   Weight: 113 lb (51.3 kg)   Height: 5' 5\" (1.651 m)       Review of Systems   Constitutional:  Negative for fever. HENT:  Negative for congestion, rhinorrhea and sore throat. Respiratory:  Negative for cough and shortness of breath. Skin:  Positive for rash. Physical Exam  Vitals reviewed. Constitutional:       General: She is not in acute distress. HENT:      Head: Normocephalic and atraumatic. Pulmonary:      Effort: Pulmonary effort is normal. No respiratory distress.

## 2023-07-16 ENCOUNTER — APPOINTMENT (OUTPATIENT)
Dept: GENERAL RADIOLOGY | Age: 43
End: 2023-07-16
Payer: MEDICAID

## 2023-07-16 ENCOUNTER — HOSPITAL ENCOUNTER (EMERGENCY)
Age: 43
Discharge: HOME | End: 2023-07-16
Attending: EMERGENCY MEDICINE
Payer: MEDICAID

## 2023-07-16 VITALS
TEMPERATURE: 98.8 F | BODY MASS INDEX: 17.72 KG/M2 | SYSTOLIC BLOOD PRESSURE: 132 MMHG | OXYGEN SATURATION: 96 % | HEIGHT: 66 IN | HEART RATE: 99 BPM | DIASTOLIC BLOOD PRESSURE: 92 MMHG | RESPIRATION RATE: 18 BRPM | WEIGHT: 110.23 LBS

## 2023-07-16 DIAGNOSIS — M25.462 KNEE EFFUSION, LEFT: Primary | ICD-10-CM

## 2023-07-16 DIAGNOSIS — M25.561 ACUTE BILATERAL KNEE PAIN: ICD-10-CM

## 2023-07-16 DIAGNOSIS — M25.562 ACUTE BILATERAL KNEE PAIN: ICD-10-CM

## 2023-07-16 PROCEDURE — 96372 THER/PROPH/DIAG INJ SC/IM: CPT

## 2023-07-16 PROCEDURE — 73560 X-RAY EXAM OF KNEE 1 OR 2: CPT

## 2023-07-16 PROCEDURE — 6360000002 HC RX W HCPCS: Performed by: EMERGENCY MEDICINE

## 2023-07-16 PROCEDURE — 99284 EMERGENCY DEPT VISIT MOD MDM: CPT

## 2023-07-16 RX ORDER — KETOROLAC TROMETHAMINE 30 MG/ML
30 INJECTION, SOLUTION INTRAMUSCULAR; INTRAVENOUS ONCE
Status: COMPLETED | OUTPATIENT
Start: 2023-07-16 | End: 2023-07-16

## 2023-07-16 RX ORDER — METHYLPREDNISOLONE 4 MG/1
4 TABLET ORAL SEE ADMIN INSTRUCTIONS
Qty: 1 KIT | Refills: 0 | Status: SHIPPED | OUTPATIENT
Start: 2023-07-16 | End: 2023-07-22

## 2023-07-16 RX ADMIN — KETOROLAC TROMETHAMINE 30 MG: 30 INJECTION, SOLUTION INTRAMUSCULAR; INTRAVENOUS at 12:05

## 2023-07-16 ASSESSMENT — PAIN DESCRIPTION - LOCATION: LOCATION: KNEE

## 2023-07-16 ASSESSMENT — PAIN - FUNCTIONAL ASSESSMENT
PAIN_FUNCTIONAL_ASSESSMENT: 0-10
PAIN_FUNCTIONAL_ASSESSMENT: 0-10

## 2023-07-16 ASSESSMENT — PAIN SCALES - GENERAL
PAINLEVEL_OUTOF10: 10
PAINLEVEL_OUTOF10: 10
PAINLEVEL_OUTOF10: 7

## 2023-07-16 ASSESSMENT — PAIN DESCRIPTION - ORIENTATION: ORIENTATION: RIGHT;LEFT

## 2023-07-16 ASSESSMENT — PAIN DESCRIPTION - PAIN TYPE: TYPE: ACUTE PAIN

## 2023-07-16 ASSESSMENT — PAIN DESCRIPTION - DESCRIPTORS: DESCRIPTORS: THROBBING

## 2023-07-20 ENCOUNTER — OFFICE VISIT (OUTPATIENT)
Dept: ORTHOPEDIC SURGERY | Age: 43
End: 2023-07-20

## 2023-07-20 VITALS — HEIGHT: 66 IN | WEIGHT: 110 LBS | BODY MASS INDEX: 17.68 KG/M2

## 2023-07-20 DIAGNOSIS — S86.911A KNEE STRAIN, RIGHT, INITIAL ENCOUNTER: ICD-10-CM

## 2023-07-20 DIAGNOSIS — M25.561 PAIN IN BOTH KNEES, UNSPECIFIED CHRONICITY: Primary | ICD-10-CM

## 2023-07-20 DIAGNOSIS — M25.562 PAIN IN BOTH KNEES, UNSPECIFIED CHRONICITY: Primary | ICD-10-CM

## 2023-07-20 NOTE — PROGRESS NOTES
ORTHOPAEDIC NEW PATIENT NOTE    Chief Complaint   Patient presents with    Knee Pain     Bilateral knee pain       HPI  7/20/23  41 y.o. female referred by the ED and seen for evaluation of bilateral knee pain:  Patient reports chronic left knee pain  Right knee pain started approximately a week ago  There is no injury or trauma mechanism  The right is currently worse than the left  Diffuse in nature, but the right knee is worse medially, left anteriorly  She was seen in the emergency department and given a Medrol Dosepak, which she only took the first day of  She is using voltaren gel  Pain is rated 10 out of 10  She works for [employer]  I have read over the ROS from the Patient History Form dated on 7/20/23  Pertinent positives include none listed  Rest of 13 point ROS otherwise negative except per HPI, and scanned into the patient's chart under the Media tab. No Known Allergies     Current Outpatient Medications   Medication Sig Dispense Refill    diclofenac sodium (VOLTAREN) 1 % GEL Apply 4 g topically 4 times daily To bilateral knees 100 g 0    methylPREDNISolone (MEDROL, KARRI,) 4 MG tablet Take 1 tablet by mouth See Admin Instructions for 6 days By mouth as directed on the package. 1 kit 0    mupirocin (BACTROBAN) 2 % cream Apply topically 3 times daily. 30 g 0    cetirizine (ZYRTEC) 10 MG tablet Take 1 tablet by mouth daily 90 tablet 0    busPIRone (BUSPAR) 5 MG tablet TAKE 1 TABLET BY MOUTH TWICE DAILY 180 tablet 2    ferrous sulfate (IRON 325) 325 (65 Fe) MG tablet Take 1 tablet by mouth daily (with breakfast) 90 tablet 1    albuterol sulfate HFA (VENTOLIN HFA) 108 (90 Base) MCG/ACT inhaler Inhale 2 puffs into the lungs 4 times daily as needed for Wheezing 18 g 0    mirtazapine (REMERON) 15 MG tablet Take 1 tablet by mouth nightly 90 tablet 1     No current facility-administered medications for this visit.        Past Medical History:   Diagnosis Date    Anemia     Cannabis hyperemesis

## 2023-07-20 NOTE — PROGRESS NOTES
Procedures    Breg Short Runner WrapAround Knee Brace     Patient was prescribed a Breg Shortrunner. The right knee will require stabilization / immobilization from this semi-rigid / rigid orthosis to improve their function. The orthosis will assist in protecting the affected area, provide functional support and facilitate healing. The patient was educated and fit by a healthcare professional with expert knowledge and specialization in brace application while under the direct supervision of the physician. Verbal and written instructions for the use of and application of this item were provided. They were instructed to contact the office immediately should the brace result in increased pain, decreased sensation, increased swelling or worsening of the condition.

## 2023-08-02 NOTE — PLAN OF CARE
demonstrate increased AROM to wfl to allow for proper joint functioning as indicated by patients Functional Deficits. [] Progressing: [] Met: [] Not Met: [] Adjusted  3. Patient will demonstrate an increase in Strength to good proximal hip strength and control, within 5lb HHD in LE to allow for proper functional mobility as indicated by patients Functional Deficits. [] Progressing: [] Met: [] Not Met: [] Adjusted  4. Patient will return to wfl  functional activities without increased symptoms or restriction. [] Progressing: [] Met: [] Not Met: [] Adjusted  5. \" I want to be able to walk and work without pain   [] Progressing: [] Met: [] Not Met: [] Adjusted     Electronically signed by:  Kenia Palomares PT      Note: If patient does not return for scheduled/recommended follow up visits, this note will serve as a discharge from care along with the most recent update on progress.

## 2023-08-03 ENCOUNTER — HOSPITAL ENCOUNTER (OUTPATIENT)
Dept: PHYSICAL THERAPY | Age: 43
Setting detail: THERAPIES SERIES
Discharge: HOME | End: 2023-08-03
Attending: ORTHOPAEDIC SURGERY
Payer: MEDICAID

## 2023-08-03 PROCEDURE — 97110 THERAPEUTIC EXERCISES: CPT

## 2023-08-03 PROCEDURE — 97161 PT EVAL LOW COMPLEX 20 MIN: CPT

## 2023-08-03 PROCEDURE — 97140 MANUAL THERAPY 1/> REGIONS: CPT

## 2023-08-03 NOTE — FLOWSHEET NOTE
[address] [address]  Phone: [phone]   Fax:     [phone]      Date:  2023    Patient Name:  Allyssa Espinosa    :  1980  MRN: [id number]    Pertinent Medical History: Additional Pertinent Hx: anemia, diverticulitis, hx of alcohol abuse, cannibus use, pancolitis, scabies    Medical/Treatment Diagnosis Information:  Medical Diagnosis: Pain in both knees, unspecified chronicity [M25.561, M25.562]  Knee strain, right, initial encounter [O24.535V]  Treatment Diagnosis: decreased ability to function    Insurance/Certification information:  PT Insurance Information: [facility]  Physician Information:  Charles Smalls MD  Plan of care signed (Y/N): routed    Date of Patient follow up with Physician:      Progress Report: []  Yes  [x]  No     Date Range for reporting period:  Beginnin/3/2023  Ending:     Progress report due (10 Rx/or 30 days whichever is less): 65     Recertification due (POC duration/ or 90 days whichever is less):      Visit # Insurance/POC Allowable Auth Needed     []Yes    []No       Latex Allergy:  [x]NO      []YES  Preferred Language for Healthcare:   [x]English       []Other:    Functional Scale:        Test: WOMAC- 78  Date Assessed Score               Pain level:  6-9/10     History of Injury:Patient is a 40 y/o female with a hx of left knee pain for a year or so  and right knee pain since a few weeks ago. She did not have an injury. She said she was not able to walk after her right knee started to bother her. She says her knees feel weak and like they ar going to give out. She works at [employer] and is up and down. She is worse on the steps and squatting. She is in a Breg brace on he right at this time. She hopes to have a decrease in pain and increase in function.       SUBJECTIVE:  Pt states, \" My right knee is killing me, I can't walk

## 2023-08-08 ENCOUNTER — HOSPITAL ENCOUNTER (OUTPATIENT)
Dept: PHYSICAL THERAPY | Age: 43
Setting detail: THERAPIES SERIES
Discharge: HOME | End: 2023-08-08
Attending: ORTHOPAEDIC SURGERY
Payer: MEDICAID

## 2023-08-08 PROCEDURE — 97110 THERAPEUTIC EXERCISES: CPT

## 2023-08-08 PROCEDURE — 97140 MANUAL THERAPY 1/> REGIONS: CPT

## 2023-08-08 NOTE — FLOWSHEET NOTE
due to co-morbidities. [x] Plan just implemented, too soon to assess goals progression <30days   [] Goals require adjustment due to lack of progress  [] Patient is not progressing as expected and requires additional follow up with physician  [] Other    Prognosis for POC: [x] Good [] Fair  [] Poor    Patient requires continued skilled intervention: [x] Yes  [] No        PLAN: LE arom, prom  strength, proprioception, gait, balance, functional activities. Mfr, joint mobs, mods as needed, hep. Progress as tolerated  , possible med meniscus tear on the right, rom, strength, stretching, proprio,   [] Continue per plan of care [] Alter current plan (see comments)  [x] Plan of care initiated [] Hold pending MD visit [] Discharge    Electronically signed by: Nancy Valladares PT    Note: If patient does not return for scheduled/recommended follow up visits, this note will serve as a discharge from care along with the most recent update on progress.

## 2023-08-10 ENCOUNTER — HOSPITAL ENCOUNTER (OUTPATIENT)
Dept: PHYSICAL THERAPY | Age: 43
Setting detail: THERAPIES SERIES
Discharge: HOME | End: 2023-08-10
Attending: ORTHOPAEDIC SURGERY
Payer: MEDICAID

## 2023-08-10 PROCEDURE — 97112 NEUROMUSCULAR REEDUCATION: CPT

## 2023-08-10 PROCEDURE — 97140 MANUAL THERAPY 1/> REGIONS: CPT

## 2023-08-10 PROCEDURE — 97110 THERAPEUTIC EXERCISES: CPT

## 2023-08-10 NOTE — FLOWSHEET NOTE
[address] [address]  Phone: [phone]   Fax:     [phone]      Date:  08/10/2023    Patient Name:  Kamala Kirkpatrick    :  1980  MRN: [id number]    Pertinent Medical History: Additional Pertinent Hx: anemia, diverticulitis, hx of alcohol abuse, cannibus use, pancolitis, scabies    Medical/Treatment Diagnosis Information:  Medical Diagnosis: Pain in both knees, unspecified chronicity [M25.561, M25.562]  Knee strain, right, initial encounter [L79.450S]  Treatment Diagnosis: decreased ability to function    Insurance/Certification information:  PT Insurance Information: [facility]  Physician Information:  Riley Del Rio MD  Plan of care signed (Y/N): routed    Date of Patient follow up with Physician:      Progress Report: []  Yes  [x]  No     Date Range for reporting period:  Beginnin/10/2023  Ending:     Progress report due (10 Rx/or 30 days whichever is less): 98     Recertification due (POC duration/ or 90 days whichever is less):      Visit # Insurance/POC Allowable Auth Needed   3 /12 by 11/3/23   []Yes    []No       Latex Allergy:  [x]NO      []YES  Preferred Language for Healthcare:   [x]English       []Other:    Functional Scale:        Test: WOMAC- 78  Date Assessed Score               Pain level:  6/10     History of Injury:Patient is a 42 y/o female with a hx of left knee pain for a year or so  and right knee pain since a few weeks ago. She did not have an injury. She said she was not able to walk after her right knee started to bother her. She says her knees feel weak and like they ar going to give out. She works at [employer] and is up and down. She is worse on the steps and squatting. She is in a Breg brace on he right at this time. She hopes to have a decrease in pain and increase in function.       SUBJECTIVE:  Pt states, \" My right knee is killing me, I

## 2023-08-16 ENCOUNTER — HOSPITAL ENCOUNTER (OUTPATIENT)
Dept: PHYSICAL THERAPY | Age: 43
Setting detail: THERAPIES SERIES
Discharge: HOME | End: 2023-08-16
Attending: ORTHOPAEDIC SURGERY
Payer: MEDICAID

## 2023-08-16 PROCEDURE — 97112 NEUROMUSCULAR REEDUCATION: CPT

## 2023-08-16 PROCEDURE — 97110 THERAPEUTIC EXERCISES: CPT

## 2023-08-16 PROCEDURE — 97140 MANUAL THERAPY 1/> REGIONS: CPT

## 2023-08-16 NOTE — FLOWSHEET NOTE
[address] [facility]. [address]  Phone: [phone]   Fax:     [phone]      Date:  2023    Patient Name:  Adriana Larson    :  1980  MRN: [id number]    Pertinent Medical History: Additional Pertinent Hx: anemia, diverticulitis, hx of alcohol abuse, cannibus use, pancolitis, scabies    Medical/Treatment Diagnosis Information:  Medical Diagnosis: Pain in both knees, unspecified chronicity [M25.561, M25.562]  Knee strain, right, initial encounter [F42.018D]  Treatment Diagnosis: decreased ability to function    Insurance/Certification information:  PT Insurance Information: [facility]  Physician Information:  Milli Ghotra MD  Plan of care signed (Y/N): routed    Date of Patient follow up with Physician:      Progress Report: []  Yes  [x]  No     Date Range for reporting period:  Beginnin2023  Ending:     Progress report due (10 Rx/or 30 days whichever is less): 17     Recertification due (POC duration/ or 90 days whichever is less):      Visit # Insurance/POC Allowable Auth Needed    by 11/3/23   []Yes    []No       Latex Allergy:  [x]NO      []YES  Preferred Language for Healthcare:   [x]English       []Other:    Functional Scale:        Test: WOMAC- 78  Date Assessed Score               Pain level:  6/10     History of Injury:Patient is a 40 y/o female with a hx of left knee pain for a year or so  and right knee pain since a few weeks ago. She did not have an injury. She said she was not able to walk after her right knee started to bother her. She says her knees feel weak and like they ar going to give out. She works at [employer] and is up and down. She is worse on the steps and squatting. She is in a Breg brace on he right at this time. She hopes to have a decrease in pain and increase in function.       SUBJECTIVE:  Pt states, \" My right knee is killing me, I

## 2023-08-18 ENCOUNTER — HOSPITAL ENCOUNTER (OUTPATIENT)
Dept: PHYSICAL THERAPY | Age: 43
Setting detail: THERAPIES SERIES
Discharge: HOME | End: 2023-08-18
Attending: ORTHOPAEDIC SURGERY
Payer: MEDICAID

## 2023-08-18 PROCEDURE — 97112 NEUROMUSCULAR REEDUCATION: CPT

## 2023-08-18 PROCEDURE — 97140 MANUAL THERAPY 1/> REGIONS: CPT

## 2023-08-18 PROCEDURE — 97110 THERAPEUTIC EXERCISES: CPT

## 2023-08-18 NOTE — FLOWSHEET NOTE
[address] [address]  Phone: [phone]   Fax:     [phone]      Date:  2023    Patient Name:  Nevaeh Rogers    :  1980  MRN: [id number]    Pertinent Medical History: Additional Pertinent Hx: anemia, diverticulitis, hx of alcohol abuse, cannibus use, pancolitis, scabies    Medical/Treatment Diagnosis Information:  Medical Diagnosis: Pain in both knees, unspecified chronicity [M25.561, M25.562]  Knee strain, right, initial encounter [M88.146J]  Treatment Diagnosis: decreased ability to function    Insurance/Certification information:  PT Insurance Information: [facility]  Physician Information:  Anam Mccoy MD  Plan of care signed (Y/N): routed    Date of Patient follow up with Physician:      Progress Report: []  Yes  [x]  No     Date Range for reporting period:  Beginnin2023  Ending:     Progress report due (10 Rx/or 30 days whichever is less):      Recertification due (POC duration/ or 90 days whichever is less):      Visit # Insurance/POC Allowable Auth Needed    by 11/3/23   []Yes    []No       Latex Allergy:  [x]NO      []YES  Preferred Language for Healthcare:   [x]English       []Other:    Functional Scale:        Test: WOMAC- 78  Date Assessed Score               Pain level:  10     History of Injury:Patient is a 42 y/o female with a hx of left knee pain for a year or so  and right knee pain since a few weeks ago. She did not have an injury. She said she was not able to walk after her right knee started to bother her. She says her knees feel weak and like they ar going to give out. She works at [employer] and is up and down. She is worse on the steps and squatting. She is in a Breg brace on he right at this time. She hopes to have a decrease in pain and increase in function.       SUBJECTIVE:  Pt states, \" My right knee is killing me, I

## 2023-08-25 ENCOUNTER — HOSPITAL ENCOUNTER (OUTPATIENT)
Dept: PHYSICAL THERAPY | Age: 43
Setting detail: THERAPIES SERIES
Discharge: HOME | End: 2023-08-25
Attending: ORTHOPAEDIC SURGERY
Payer: MEDICAID

## 2023-08-25 NOTE — FLOWSHEET NOTE
[address] [address]  Phone: [phone]   Fax:     [phone]    Physical Therapy  Cancellation/No-show Note  Patient Name:  Amara Corona  :  1980   Date:  2023  Cancelled visits to date: 0  No-shows to date: 1    Patient status for today's appointment patient:  []  Cancelled  []  Rescheduled appointment  [x]  No-show     Reason given by patient:  []  Patient ill  []  Conflicting appointment  []  No transportation    []  Conflict with work  [x]  No reason given  []  Other:     Comments:      Phone call information:   [x]  Phone call made today to patient at 12:40_ time at number provided:      []  Patient answered, conversation as follows:    [x]  Patient did not answer, message left as follows: Alden Barry They were reminded of the attendance policy and might be discharged if they miss again. Reminded them of their next appointment time and date and to call if they are going to miss.    []  Phone call not made today    Electronically signed by:  Bo Johnson PTA

## 2023-08-30 ENCOUNTER — HOSPITAL ENCOUNTER (OUTPATIENT)
Dept: PHYSICAL THERAPY | Age: 43
Setting detail: THERAPIES SERIES
Discharge: HOME | End: 2023-08-30
Attending: ORTHOPAEDIC SURGERY
Payer: MEDICAID

## 2023-08-30 PROCEDURE — 97140 MANUAL THERAPY 1/> REGIONS: CPT

## 2023-08-30 PROCEDURE — 97110 THERAPEUTIC EXERCISES: CPT

## 2023-08-30 PROCEDURE — 97112 NEUROMUSCULAR REEDUCATION: CPT

## 2023-08-30 NOTE — FLOWSHEET NOTE
improving balance, coordination, kinesthetic sense, posture, motor skill, proprioception and motor activation to allow for proper function of core, proximal hip and LE with self care and ADLs and functional mobility. [x] (40569) Gait Re-education- Provided training and instruction to the patient for proper LE, core and proximal hip recruitment and positioning and eccentric body weight control with ambulation re-education including up and down stairs     Home Exercise Program:    [x] (94859) Reviewed/Progressed HEP activities related to strengthening, flexibility, endurance, ROM of core, proximal hip and LE for functional self-care, mobility, lifting and ambulation/stair navigation   [x] (34510)Reviewed/Progressed HEP activities related to improving balance, coordination, kinesthetic sense, posture, motor skill, proprioception of core, proximal hip and LE for self care, mobility, lifting, and ambulation/stair navigation      Manual Treatments:  PROM / STM / Oscillations-Mobs:  G-I, II, III, IV (PA's, Inf., Post.)  [x] (77275) Provided manual therapy to mobilize LE, proximal hip and/or LS spine soft tissue/joints for the purpose of modulating pain, promoting relaxation,  increasing ROM, reducing/eliminating soft tissue swelling/inflammation/restriction, improving soft tissue extensibility and allowing for proper ROM for normal function with self care, mobility, lifting and ambulation. Modalities:  [] (86592) Vasopneumatic compression: Utilized vasopneumatic compression to decrease edema / swelling for the purpose of improving mobility and quad tone / recruitment which will allow for increased overall function including but not limited to self-care, transfers, ambulation, and ascending / descending stairs.      If [facility] Please Indicate Time In/Out  CPT Code Time in Time out                         Approval Dates:  CPT Code Units Approved Units Used  Date Updated:                     Charges:  Timed Code Treatment

## 2023-09-01 ENCOUNTER — HOSPITAL ENCOUNTER (OUTPATIENT)
Dept: PHYSICAL THERAPY | Age: 43
Setting detail: THERAPIES SERIES
Discharge: HOME | End: 2023-09-01
Attending: ORTHOPAEDIC SURGERY
Payer: MEDICAID

## 2023-09-01 PROCEDURE — 97140 MANUAL THERAPY 1/> REGIONS: CPT

## 2023-09-01 PROCEDURE — 97112 NEUROMUSCULAR REEDUCATION: CPT

## 2023-09-01 PROCEDURE — 97110 THERAPEUTIC EXERCISES: CPT

## 2023-09-01 NOTE — FLOWSHEET NOTE
control. NMR and Therapeutic Activities:    [x] (92053 or 91198) Provided verbal/tactile cueing for activities related to improving balance, coordination, kinesthetic sense, posture, motor skill, proprioception and motor activation to allow for proper function of core, proximal hip and LE with self care and ADLs and functional mobility. [x] (60063) Gait Re-education- Provided training and instruction to the patient for proper LE, core and proximal hip recruitment and positioning and eccentric body weight control with ambulation re-education including up and down stairs     Home Exercise Program:    [x] (67600) Reviewed/Progressed HEP activities related to strengthening, flexibility, endurance, ROM of core, proximal hip and LE for functional self-care, mobility, lifting and ambulation/stair navigation   [x] (41782)Reviewed/Progressed HEP activities related to improving balance, coordination, kinesthetic sense, posture, motor skill, proprioception of core, proximal hip and LE for self care, mobility, lifting, and ambulation/stair navigation      Manual Treatments:  PROM / STM / Oscillations-Mobs:  G-I, II, III, IV (PA's, Inf., Post.)  [x] (50059) Provided manual therapy to mobilize LE, proximal hip and/or LS spine soft tissue/joints for the purpose of modulating pain, promoting relaxation,  increasing ROM, reducing/eliminating soft tissue swelling/inflammation/restriction, improving soft tissue extensibility and allowing for proper ROM for normal function with self care, mobility, lifting and ambulation. Modalities:  [] (62448) Vasopneumatic compression: Utilized vasopneumatic compression to decrease edema / swelling for the purpose of improving mobility and quad tone / recruitment which will allow for increased overall function including but not limited to self-care, transfers, ambulation, and ascending / descending stairs.      If [facility] Please Indicate Time In/Out  CPT Code Time in Time out

## 2023-09-06 ENCOUNTER — HOSPITAL ENCOUNTER (OUTPATIENT)
Dept: PHYSICAL THERAPY | Age: 43
Setting detail: THERAPIES SERIES
Discharge: HOME | End: 2023-09-06
Attending: ORTHOPAEDIC SURGERY
Payer: MEDICAID

## 2023-09-06 NOTE — FLOWSHEET NOTE
[address] [address]  Phone: [phone]   Fax:     [phone]    Physical Therapy  Cancellation/No-show Note  Patient Name:  Miguelito Renteria  :  1980   Date:  2023  Cancelled visits to date: 1  No-shows to date: 1    Patient status for today's appointment patient:  [x]  Cancelled  []  Rescheduled appointment  []  No-show     Reason given by patient:  [x]  Patient ill  []  Conflicting appointment  []  No transportation    []  Conflict with work  []  No reason given  []  Other:     Comments:      Phone call information:   []  Phone call made today to patient at 12:40_ time at number provided:      []  Patient answered, conversation as follows:    []  Patient did not answer, message left as follows:    [x]  Phone call not made today    Electronically signed by:  Rossi De Jesus PTA

## 2023-09-08 ENCOUNTER — HOSPITAL ENCOUNTER (OUTPATIENT)
Dept: PHYSICAL THERAPY | Age: 43
Setting detail: THERAPIES SERIES
Discharge: HOME | End: 2023-09-08
Attending: ORTHOPAEDIC SURGERY
Payer: MEDICAID

## 2023-09-08 PROCEDURE — 97110 THERAPEUTIC EXERCISES: CPT

## 2023-09-08 PROCEDURE — 97140 MANUAL THERAPY 1/> REGIONS: CPT

## 2023-09-08 PROCEDURE — 97112 NEUROMUSCULAR REEDUCATION: CPT

## 2023-09-08 NOTE — FLOWSHEET NOTE
wfl  functional activities without increased symptoms or restriction. [x] Progressing: [] Met: [] Not Met: [] Adjusted  5. \" I want to be able to walk and work without pain   [x] Progressing: [] Met: [] Not Met: [] Adjusted       ASSESSMENT:  See eval    Treatment/Activity Tolerance:  [x] Patient tolerated treatment well [] Patient limited by fatique  [] Patient limited by pain  [] Patient limited by other medical complications  [] Other:     Overall Progression Towards Functional goals/ Treatment Progress Update:  [] Patient is progressing as expected towards functional goals listed. [] Progression is slowed due to complexities/Impairments listed. [] Progression has been slowed due to co-morbidities. [x] Plan just implemented, too soon to assess goals progression <30days   [] Goals require adjustment due to lack of progress  [] Patient is not progressing as expected and requires additional follow up with physician  [] Other    Prognosis for POC: [x] Good [] Fair  [] Poor    Patient requires continued skilled intervention: [x] Yes  [] No        PLAN: LE arom, prom  strength, proprioception, gait, balance, functional activities. Mfr, joint mobs, mods as needed, hep. Progress as tolerated  , possible med meniscus tear on the right, rom, strength, stretching, proprio,   8/18/23  Work on full knee ext right, improving, increase exs for proprio, strength      [] Continue per plan of care [] Alter current plan (see comments)  [x] Plan of care initiated [] Hold pending MD visit [] Discharge    Electronically signed by: Annemarie Painting PT    Note: If patient does not return for scheduled/recommended follow up visits, this note will serve as a discharge from care along with the most recent update on progress.

## 2023-09-15 ENCOUNTER — HOSPITAL ENCOUNTER (OUTPATIENT)
Dept: PHYSICAL THERAPY | Age: 43
Setting detail: THERAPIES SERIES
End: 2023-09-15
Attending: ORTHOPAEDIC SURGERY
Payer: MEDICAID

## 2023-09-18 ENCOUNTER — HOSPITAL ENCOUNTER (OUTPATIENT)
Dept: PHYSICAL THERAPY | Age: 43
Setting detail: THERAPIES SERIES
Discharge: HOME | End: 2023-09-18
Attending: ORTHOPAEDIC SURGERY
Payer: MEDICAID

## 2023-09-18 NOTE — FLOWSHEET NOTE
[address] [address]  Phone: [phone]   Fax:     [phone]    Physical Therapy  Cancellation/No-show Note  Patient Name:  Tomasa Khan  :  1980   Date:  2023  Cancelled visits to date: 1  No-shows to date: 2    Patient status for today's appointment patient:  []  Cancelled  []  Rescheduled appointment  [x]  No-show     Reason given by patient:  []  Patient ill  []  Conflicting appointment  []  No transportation    [x]  Conflict with work  []  No reason given  []  Other:     Comments:      Phone call information:   [x]  Phone call made today to patient at 5:45 PM at number provided:      [x]  Patient answered, conversation as follows: Discussed missed appointment. Patient states her work schedule is changing and she needs later appointment times.  Cancelled appointments she is unable to make, reminded patient of next appointment on  at 5:00 PM.    []  Patient did not answer, message left as follows:    []  Phone call not made today    Electronically signed by:  Barrie Basurto, PT, DPT

## 2023-09-20 ENCOUNTER — APPOINTMENT (OUTPATIENT)
Dept: PHYSICAL THERAPY | Age: 43
End: 2023-09-20
Attending: ORTHOPAEDIC SURGERY
Payer: MEDICAID

## 2023-09-25 ENCOUNTER — HOSPITAL ENCOUNTER (OUTPATIENT)
Dept: PHYSICAL THERAPY | Age: 43
Setting detail: THERAPIES SERIES
Discharge: HOME | End: 2023-09-25
Attending: ORTHOPAEDIC SURGERY
Payer: MEDICAID

## 2023-09-25 NOTE — FLOWSHEET NOTE
[address] [address]  Phone: [phone]   Fax:     [phone]    Physical Therapy  Cancellation/No-show Note  Patient Name:  Any Singletary  :  1980   Date:  2023  Cancelled visits to date: 1  No-shows to date: 3    Patient status for today's appointment patient:  []  Cancelled  []  Rescheduled appointment  [x]  No-show     Reason given by patient:  []  Patient ill  []  Conflicting appointment  []  No transportation    []  Conflict with work  [x]  No reason given  []  Other:     Comments:      Phone call information:   [x]  Phone call made today to patient at 5:40 PM at number provided:      []  Patient answered, conversation as follows:    [x]  Patient did not answer, message left as follows: They were reminded of the attendance policy and might be discharged if they miss again. Reminded them of their next appointment time and date and to call if they are going to miss.     []  Phone call not made today    Electronically signed by:  Shweta Bradley, [address] [place] [address]

## 2023-09-26 ENCOUNTER — APPOINTMENT (OUTPATIENT)
Dept: PHYSICAL THERAPY | Age: 43
End: 2023-09-26
Attending: ORTHOPAEDIC SURGERY
Payer: MEDICAID

## 2023-09-27 ENCOUNTER — HOSPITAL ENCOUNTER (OUTPATIENT)
Dept: PHYSICAL THERAPY | Age: 43
Setting detail: THERAPIES SERIES
Discharge: HOME | End: 2023-09-27
Attending: ORTHOPAEDIC SURGERY
Payer: MEDICAID

## 2023-09-27 PROCEDURE — 97112 NEUROMUSCULAR REEDUCATION: CPT

## 2023-09-27 PROCEDURE — 97110 THERAPEUTIC EXERCISES: CPT

## 2023-09-27 PROCEDURE — 97140 MANUAL THERAPY 1/> REGIONS: CPT

## 2023-09-27 NOTE — FLOWSHEET NOTE
ski X 2 min    Standing hip abd/ext Orange x 30 ea    sls X 2 min    Ext stretch  60 x 3                        Therapeutic Activity (40087)  Min: Discussed mechanism of injury, anatomy, physiology, biomechanics, sleeping positions,  and strategies to accelerate the healing process. Answered all of patients questions regarding injury. Gave necessary information to get any equipment needed. Modalities  Min:     IFC with      CP after exercises     MH after exercises            GAMEREADY:  GIRTH L R POST VASO   Mid Patella      5 cm above      Infra Patella      Mid Calf      Malleoli          Other Therapeutic Activities: Pt was educated on PT POC, Diagnosis, Prognosis, pathomechanics as well as frequency and duration of scheduling future physical therapy appointments. Time was also taken on this day to answer all patient questions and participation in PT. Reviewed appointment policy in detail with patient and patient verbalized understanding. Home Exercise Program: Patient was instructed in the following for HEP:     . Patient verbalized/demonstrated understanding and was issued written handout. Access Code: [email or web address]  URL: [email or web address]  Date: 08/03/2023  Prepared by: [place]    Exercises  - Supine Quad Set  - 1 x daily - 7 x weekly - 3 sets - 10 reps  - Active Straight Leg Raise with Quad Set  - 1 x daily - 7 x weekly - 3 sets - 10 reps  - Seated Long Arc Quad  - 1 x daily - 7 x weekly - 3 sets - 10 reps  - Seated Table Hamstring Stretch  - 1 x daily - 7 x weekly - 3 sets - 10 reps    Therapeutic Exercise and NMR EXR  [x] (39677) Provided verbal/tactile cueing for activities related to strengthening, flexibility, endurance, ROM for improvements in LE, proximal hip, and core control with self care, mobility, lifting, ambulation.   [x] (39086) Provided verbal/tactile cueing for activities related to improving balance, coordination, kinesthetic sense, posture, motor skill,

## 2023-10-17 ENCOUNTER — HOSPITAL ENCOUNTER (OUTPATIENT)
Dept: PHYSICAL THERAPY | Age: 43
Setting detail: THERAPIES SERIES
Discharge: HOME | End: 2023-10-17
Attending: ORTHOPAEDIC SURGERY
Payer: MEDICAID

## 2023-10-17 PROCEDURE — 97140 MANUAL THERAPY 1/> REGIONS: CPT

## 2023-10-17 PROCEDURE — 97110 THERAPEUTIC EXERCISES: CPT

## 2023-10-17 PROCEDURE — 97112 NEUROMUSCULAR REEDUCATION: CPT

## 2023-10-17 NOTE — FLOWSHEET NOTE
swelling/inflammation/restriction, improving soft tissue extensibility and allowing for proper ROM for normal function with self care, mobility, lifting and ambulation. Modalities:  [] (33009) Vasopneumatic compression: Utilized vasopneumatic compression to decrease edema / swelling for the purpose of improving mobility and quad tone / recruitment which will allow for increased overall function including but not limited to self-care, transfers, ambulation, and ascending / descending stairs. If [facility] Please Indicate Time In/Out  CPT Code Time in Time out                         Approval Dates:  CPT Code Units Approved Units Used  Date Updated:                     Charges:  Timed Code Treatment Minutes: 60   Total Treatment Minutes: 65      [] EVAL (LOW) 73213 (typically 20 minutes face-to-face)  [] EVAL (MOD) 41060 (typically 30 minutes face-to-face)  [] EVAL (HIGH) 93606 (typically 45 minutes face-to-face)  [] RE-EVAL     [x] BX(63459) x 1   [] Dry needle 1 or 2 Muscles (52344)  [x] NMR (17622) x2    [] Dry needle 3+ Muscles (28283)  [x] Manual (93406) x 1    [] Ultrasound (47715) x  [] TA (33740) x     [] Mech Traction (00751)  [] ES(attended) (64642)     [] ES (un) (22 116512):   [] Vasopump (06953) [] Ionto (20204)   [] Other:    GOALS:  Patient stated goal:  I want to have less pain and increased function  [x] Progressing: [] Met: [] Not Met: [] Adjusted     Therapist goals for Patient:   Short Term Goals: To be achieved in: 2 weeks  1. Independent in HEP and progression per patient tolerance, in order to prevent re-injury. [x] Progressing: [] Met: [] Not Met: [] Adjusted  2. Patient will have a decrease in pain to facilitate improvement in movement, function, and ADLs as indicated by Functional Deficits. [x] Progressing: [] Met: [] Not Met: [] Adjusted     Long Term Goals: To be achieved in: 8 weeks  1. Decrease womac  functional outcome score from 78 to 36  to assist with reaching prior level of function.

## 2023-10-19 ENCOUNTER — HOSPITAL ENCOUNTER (OUTPATIENT)
Dept: PHYSICAL THERAPY | Age: 43
Setting detail: THERAPIES SERIES
Discharge: HOME | End: 2023-10-19
Attending: ORTHOPAEDIC SURGERY
Payer: MEDICAID

## 2023-10-19 NOTE — FLOWSHEET NOTE
[address] [address]  Phone: [phone]   Fax:     [phone]    Physical Therapy  Cancellation/No-show Note  Patient Name:  Ever Cifuentes  :  1980   Date:  10/19/2023  Cancelled visits to date: 1  No-shows to date: 0    Patient status for today's appointment patient:  []  Cancelled  []  Rescheduled appointment  []  No-show     Reason given by patient:  []  Patient ill  []  Conflicting appointment  []  No transportation    [x]  Conflict with work  []  No reason given  []  Other:     Comments:      Phone call information:   []  Phone call made today to patient at _ time at number provided:      []  Patient answered, conversation as follows:    []  Patient did not answer, message left as follows:  []  Phone call not made today    Electronically signed by:  Kenia Palomares PT

## 2023-10-24 ENCOUNTER — HOSPITAL ENCOUNTER (OUTPATIENT)
Dept: PHYSICAL THERAPY | Age: 43
Setting detail: THERAPIES SERIES
Discharge: HOME | End: 2023-10-24
Attending: ORTHOPAEDIC SURGERY
Payer: MEDICAID

## 2023-10-24 PROCEDURE — 97112 NEUROMUSCULAR REEDUCATION: CPT

## 2023-10-24 PROCEDURE — 97140 MANUAL THERAPY 1/> REGIONS: CPT

## 2023-10-24 PROCEDURE — 97110 THERAPEUTIC EXERCISES: CPT

## 2023-10-24 NOTE — FLOWSHEET NOTE
strength  10/17/23  Told her she needs to continue to do her hep to feel better \"      [] Continue per plan of care [] Alter current plan (see comments)  [x] Plan of care initiated [] Hold pending MD visit [] Discharge    Electronically signed by: Kenia Palomares PT    Note: If patient does not return for scheduled/recommended follow up visits, this note will serve as a discharge from care along with the most recent update on progress.

## 2023-10-26 ENCOUNTER — HOSPITAL ENCOUNTER (OUTPATIENT)
Dept: PHYSICAL THERAPY | Age: 43
Setting detail: THERAPIES SERIES
Discharge: HOME | End: 2023-10-26
Attending: ORTHOPAEDIC SURGERY
Payer: MEDICAID

## 2023-10-26 NOTE — FLOWSHEET NOTE
[address] [address]  Phone: [phone]   Fax:     [phone]    Physical Therapy  Cancellation/No-show Note  Patient Name:  Duglas Rodgers  :  1980   Date:  10/26/2023  Cancelled visits to date: 2  No-shows to date: 3    Patient status for today's appointment patient:  [x]  Cancelled  []  Rescheduled appointment  []  No-show     Reason given by patient:  []  Patient ill  []  Conflicting appointment  []  No transportation    []  Conflict with work  [x]  No reason given  []  Other:     Comments:      Phone call information:   []  Phone call made today to patient at 5:40 PM at number provided:      []  Patient answered, conversation as follows:    []  Patient did not answer, message left as follows:    [x]  Phone call not made today    Electronically signed by:  Cheyenne Borjas, [address] [place] [address]

## 2024-03-12 NOTE — CARE COORDINATION
LSW rec'd DC order. LSW attempted to speak with patient over the phone, no answer. Patient has NO pcp. LSW placed [phone] [facility] Referral Number on the AVS for her review.   Bakari Garner [place]     Case Management   [phone]    12/27/2020  12:07 PM Yes

## 2025-07-05 ENCOUNTER — HOSPITAL ENCOUNTER (EMERGENCY)
Age: 45
Discharge: HOME | End: 2025-07-05
Payer: COMMERCIAL

## 2025-07-05 ENCOUNTER — APPOINTMENT (OUTPATIENT)
Dept: GENERAL RADIOLOGY | Age: 45
End: 2025-07-05
Payer: COMMERCIAL

## 2025-07-05 VITALS
DIASTOLIC BLOOD PRESSURE: 70 MMHG | HEART RATE: 80 BPM | OXYGEN SATURATION: 99 % | SYSTOLIC BLOOD PRESSURE: 100 MMHG | RESPIRATION RATE: 18 BRPM | TEMPERATURE: 97 F

## 2025-07-05 DIAGNOSIS — S52.502A CLOSED FRACTURE OF DISTAL END OF LEFT RADIUS, UNSPECIFIED FRACTURE MORPHOLOGY, INITIAL ENCOUNTER: ICD-10-CM

## 2025-07-05 PROCEDURE — 99283 EMERGENCY DEPT VISIT LOW MDM: CPT

## 2025-07-05 PROCEDURE — 73110 X-RAY EXAM OF WRIST: CPT

## 2025-07-05 PROCEDURE — 29125 APPL SHORT ARM SPLINT STATIC: CPT

## 2025-07-05 PROCEDURE — 6370000000 HC RX 637 (ALT 250 FOR IP): Performed by: PHYSICIAN ASSISTANT

## 2025-07-05 RX ORDER — IBUPROFEN 600 MG/1
600 TABLET, FILM COATED ORAL EVERY 8 HOURS PRN
Qty: 30 TABLET | Refills: 0 | Status: SHIPPED | OUTPATIENT
Start: 2025-07-05 | End: 2025-07-15

## 2025-07-05 RX ORDER — OXYCODONE HYDROCHLORIDE 5 MG/1
5 TABLET ORAL EVERY 8 HOURS PRN
Qty: 9 TABLET | Refills: 0 | Status: SHIPPED | OUTPATIENT
Start: 2025-07-05 | End: 2025-07-08

## 2025-07-05 RX ORDER — ACETAMINOPHEN 500 MG
1000 TABLET ORAL EVERY 8 HOURS PRN
Qty: 60 TABLET | Refills: 0 | Status: SHIPPED | OUTPATIENT
Start: 2025-07-05 | End: 2025-07-15

## 2025-07-05 RX ORDER — OXYCODONE HYDROCHLORIDE 5 MG/1
5 TABLET ORAL ONCE
Refills: 0 | Status: COMPLETED | OUTPATIENT
Start: 2025-07-05 | End: 2025-07-05

## 2025-07-05 RX ADMIN — OXYCODONE 5 MG: 5 TABLET ORAL at 16:57

## 2025-07-05 ASSESSMENT — PAIN SCALES - GENERAL
PAINLEVEL_OUTOF10: 10
PAINLEVEL_OUTOF10: 7
PAINLEVEL_OUTOF10: 10

## 2025-07-05 ASSESSMENT — PAIN DESCRIPTION - LOCATION
LOCATION: WRIST

## 2025-07-05 ASSESSMENT — PAIN - FUNCTIONAL ASSESSMENT
PAIN_FUNCTIONAL_ASSESSMENT: PREVENTS OR INTERFERES SOME ACTIVE ACTIVITIES AND ADLS
PAIN_FUNCTIONAL_ASSESSMENT: ACTIVITIES ARE NOT PREVENTED
PAIN_FUNCTIONAL_ASSESSMENT: ACTIVITIES ARE NOT PREVENTED

## 2025-07-05 ASSESSMENT — PAIN DESCRIPTION - FREQUENCY
FREQUENCY: CONTINUOUS
FREQUENCY: CONTINUOUS

## 2025-07-05 ASSESSMENT — PAIN DESCRIPTION - DESCRIPTORS
DESCRIPTORS: ACHING;JABBING
DESCRIPTORS: ACHING;POUNDING
DESCRIPTORS: ACHING

## 2025-07-05 ASSESSMENT — PAIN DESCRIPTION - ORIENTATION
ORIENTATION: LEFT

## 2025-07-05 ASSESSMENT — PAIN DESCRIPTION - PAIN TYPE
TYPE: ACUTE PAIN
TYPE: ACUTE PAIN

## 2025-07-05 NOTE — ED TRIAGE NOTES
Pt into ED from home c/o 10/10 L wrist pain. Pt states she fell last night, landing on her wrist. Swelling to area. Pt unable to move wrist. Pt denies striking head or loss of consciousness

## 2025-07-07 ENCOUNTER — TELEPHONE (OUTPATIENT)
Dept: ORTHOPEDIC SURGERY | Age: 45
End: 2025-07-07

## 2025-07-07 NOTE — TELEPHONE ENCOUNTER
S/W Harriett Green reached out regarding referral from [facility] for patient's LT distal radius fx and request for consultation with Dr. Doll or Idalmis Perez PA-C.     An appointment has been scheduled for 7/10/2025 at 1:00pm with ANJELICA at the [place] office with the patient. She was offered an appt at [facility] tomorrow with [facility] and L[facility] but declined due to location, as she only wanted to travel to the [place] office.

## 2025-07-11 ENCOUNTER — OFFICE VISIT (OUTPATIENT)
Dept: ORTHOPEDIC SURGERY | Age: 45
End: 2025-07-11

## 2025-07-11 VITALS — RESPIRATION RATE: 16 BRPM | BODY MASS INDEX: 17.68 KG/M2 | WEIGHT: 110 LBS | HEIGHT: 66 IN

## 2025-07-11 DIAGNOSIS — S52.502A TRAUMATIC CLOSED NONDISPLACED FRACTURE OF DISTAL END OF LEFT RADIUS, INITIAL ENCOUNTER: Primary | ICD-10-CM

## 2025-07-11 NOTE — PROGRESS NOTES
vomiting     Diverticulitis 12/2020    with chronic pancolitis- Dr. Pablo Serra    History of alcohol abuse     h/o 1/2 pint/day    Marijuana use, continuous     Scabies     Tobacco dependence         Past Surgical History:   Procedure Laterality Date    TYMPANOSTOMY TUBE PLACEMENT         Family History   Problem Relation Age of Onset    High Blood Pressure Mother     Pancreatic Cancer Mother 62    Diabetes Maternal Grandmother     Stroke Maternal Grandmother 70    Arthritis Maternal Grandmother        Social History     Socioeconomic History    Marital status: Single     Spouse name: Not on file    Number of children: Not on file    Years of education: Not on file    Highest education level: Not on file   Occupational History    Not on file   Tobacco Use    Smoking status: Every Day     Current packs/day: 0.50     Average packs/day: 0.5 packs/day for 26.5 years (13.3 ttl pk-yrs)     Types: Cigarettes     Start date: 1/1/1999    Smokeless tobacco: Current   Vaping Use    Vaping status: Never Used   Substance and Sexual Activity    Alcohol use: Yes     Alcohol/week: 1.0 standard drink of alcohol     Types: 1 Drinks containing 0.5 oz of alcohol per week     Comment: every other day.    Drug use: Yes     Frequency: 4.0 times per week     Types: Marijuana (Weed)     Comment: twice daily    Sexual activity: Yes     Partners: Male   Other Topics Concern    Not on file   Social History Narrative    Not on file     Social Drivers of Health     Financial Resource Strain: Not on File (7/15/2021)    Received from [facility]    Financial Resource Strain     Financial Resource Strain: 0   Food Insecurity: Unknown (1/22/2024)    Received from [facility]    Food Insecurities     Worried about running out of food: Not on file     Food Bought: Not on file   Transportation Needs: Unknown (1/22/2024)    Received from [facility]    Transportation     Worried about transportation:

## 2025-07-14 ENCOUNTER — TELEPHONE (OUTPATIENT)
Dept: ORTHOPEDIC SURGERY | Age: 45
End: 2025-07-14

## 2025-07-14 NOTE — TELEPHONE ENCOUNTER
----- Message from Ruthie GONZALEZ sent at 7/14/2025  9:20 AM EDT -----  Specialty Message to Provider    Relationship to Patient: Self     Patient Message: RETURN BACK TO WORK  --------------------------------------------------------------------------------------------------------------------------    Call Back Information: OK to leave message on voicemail  Preferred Call Back Number: Phone  [phone]    PATIENT CALLING REGARDING SHE WAS IN THE OFFICE ON FRIDAY, AND SHE WAS RELEASE TO GO BACK TO WORK TODAY, MONDAY JULY 14, 2025.    PATIENT NEED A FORM FILLED OUT TO RETURN BACK TO WORK NOTE.    PATIENT HAS FAXED THE FORM    PLEASE CALL BACK PATIENT AT THE ABOVE NUMBER.

## 2025-07-15 ENCOUNTER — TELEPHONE (OUTPATIENT)
Dept: ORTHOPEDIC SURGERY | Age: 45
End: 2025-07-15

## 2025-07-15 NOTE — TELEPHONE ENCOUNTER
Faxed completed fmla and return to work forms to [facility] at [phone]

## 2025-08-07 ENCOUNTER — OFFICE VISIT (OUTPATIENT)
Dept: ORTHOPEDIC SURGERY | Age: 45
End: 2025-08-07

## 2025-08-07 VITALS — BODY MASS INDEX: 18.33 KG/M2 | HEIGHT: 65 IN | WEIGHT: 110 LBS

## 2025-08-07 DIAGNOSIS — Z72.0 TOBACCO ABUSE: ICD-10-CM

## 2025-08-07 DIAGNOSIS — S52.502D: Primary | ICD-10-CM

## 2025-08-18 ENCOUNTER — TELEPHONE (OUTPATIENT)
Dept: ORTHOPEDIC SURGERY | Age: 45
End: 2025-08-18

## 2025-09-04 ENCOUNTER — OFFICE VISIT (OUTPATIENT)
Dept: ORTHOPEDIC SURGERY | Age: 45
End: 2025-09-04
Payer: COMMERCIAL

## 2025-09-04 VITALS — BODY MASS INDEX: 18.33 KG/M2 | HEIGHT: 65 IN | RESPIRATION RATE: 16 BRPM | WEIGHT: 110 LBS

## 2025-09-04 DIAGNOSIS — S52.502D: Primary | ICD-10-CM

## 2025-09-04 PROCEDURE — 99213 OFFICE O/P EST LOW 20 MIN: CPT | Performed by: ORTHOPAEDIC SURGERY
